# Patient Record
Sex: FEMALE
[De-identification: names, ages, dates, MRNs, and addresses within clinical notes are randomized per-mention and may not be internally consistent; named-entity substitution may affect disease eponyms.]

---

## 2020-05-24 ENCOUNTER — HOSPITAL ENCOUNTER (EMERGENCY)
Dept: HOSPITAL 88 - FSED | Age: 30
Discharge: HOME | End: 2020-05-24
Payer: COMMERCIAL

## 2020-05-24 VITALS — WEIGHT: 293 LBS | BODY MASS INDEX: 41.95 KG/M2 | HEIGHT: 70 IN

## 2020-05-24 DIAGNOSIS — M79.642: Primary | ICD-10-CM

## 2020-05-24 DIAGNOSIS — S62.307A: ICD-10-CM
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
## 2020-05-24 PROCEDURE — 99283 EMERGENCY DEPT VISIT LOW MDM: CPT

## 2020-05-24 NOTE — XMS REPORT
Summary of Care

                             Created on: 2020



WANDA ARITA

External Reference #: [id number]

: 1990

Sex: Female



Demographics





                          Address                   [address] [place] [address]

 

                          Preferred Language        English

 

                          Marital Status            Unknown

 

                          Scientology Affiliation     Unknown

 

                          Race                      White

 

                          Ethnic Group              Non-





Author





                          Author                    WANDA Dejesus R.N.

 

                          Organization              Unknown

 

                          Address                   [facility]



 

                          Phone                     Unavailable







Care Team Providers





                    Care Team Member Name Role                Phone

 

                    Danni Dejesus R.N.   Unavailable         Unavailable

 

                    ASHELY CONROY,  JONG Unavailable         Unavailable

 

                    FEROZ ESQUIVEL Unavailable         Unavailable

 

                    KAMALJIT BRANDT,  ALEJANDRO ARROYO  Unavailable         Unavailable

 

                    Jef Ortega MD Unavailable         Unavailable

 

                    ASHELY BRANDT [place],  JONG Unavailable         Unavailable

 

                    TERESA BRANDT,  PABLO WALKER Unavailable         Unavailable

 

                    KAILEY BRANDT,  JAMEY ARROYO Unavailable         Unavailable

 

                    Mark BRANDT,  Salvador Unavailable         Unavailable

 

                          Unavailable               Unavailable







Functional Status





                    Name                Dates               Details

 

                                        Functional status health issues are not 

documented

                                                    Status: 







                    Name                Dates               Details

 

                                        Cognitive status health issues are not d

ocumented

                                                    Status: 







Problems





                    Name                Dates               Details

 

                                        Need for rubella vaccination (V04.3, Z23

) 

                                                    Status: Active

 

                                        Constipation (564.00, K59.00) 

                                                    Status: Active

 

                                        Morbid obesity (278.01, E66.01) 

                                                    Status: Active

 

                                        General counseling and advice for contra

ceptive management (V25.09, Z30.09) 

                                                    Status: Active

 

                                        Elevated blood pressure reading without 

diagnosis of hypertension (796.2, R03.0)



                                                    Status: Active

 

                                        Morbid obesity with body mass index (BMI

) of 45.0 to 49.9 in adult (278.01, 

E66.01) 

                                                    Status: Active

 

                                        Acid reflux (530.81, K21.9) 

                                                    Status: Active

 

                                        Epistaxis (784.7, R04.0) 

                                                    Status: Active

 

                                        Menorrhagia with irregular cycle (626.2,

 N92.1) 

                                                    Status: Active

 

                                        Type 2B von Willebrand's disease (286.4,

 D68.0) 

                                                    Status: Active

 

                                        Former smoker (V15.82, Z87.891) 

                                                    Status: Active

 

                                        Encounter for screening examination for 

sexually transmitted disease (V74.5, 

Z11.3) 

                                                    Status: Active

 

                                        Encounter for blood test for routine gen

eral physical examination (V72.62, 

Z00.00) 

                                                    Status: Active

 

                                        Encounter for routine gynecological exam

ination with Papanicolaou smear of 

cervix (V72.31, Z01.419) 

                                                    Status: Active

 

                                        Breast mass in female (611.72, N63.0) 

                                                    Status: Active

 

                                        Pelvic pain in female (625.9, R10.2) 

                                                    Status: Active

 

                                        Follow up (V67.9, Z09) 

                                                    Status: Active

 

                                        History of  section, unknown sca

r (V45.89, Z98.891) 

                                                    Status: Active

 

                                        Bacterial vaginosis (616.10, N76.0) 

                                                    Status: Active







Medications





                    Name                Dates               Details

 

                                        Tranexamic Acid 650 MG Oral Tablet

Take 2 tablets po every 8 hours starting on day 1 of menses for a total of 5 
days



 

                                         Quantity: 30









FEROZ ESQUIVEL * 



                                         Start : 18-Sep-2019





Active

metroNIDAZOLE 500 MG Oral Tablet

TAKE ONE TABLET BY MOUTH TWICE A DAY . DO NOT DRINK ALCOHOL

* 



                           Quantity: 14              Refills: 0









JONG LUND M.D. * 



                                         Start : 5-Mar-2020





Active





Allergies and Adverse Reactions





                    Name                Dates               Details

 

                    Codeine Derivatives (Allergy)                     Status: Ac

tive



 

                    Zofran TABS (Allergy)                     Reaction: Rash, It

anival

Status: Active









Past Medical History





                    Name                Dates               Details

 

                                        History of Anemia complicating pregnancy

 (648.20, O99.019) 

                                                    Status: Resolved

 

                                        History of High-risk pregnancy supervisi

on (V23.9, O09.90) 

                                                    Status: Resolved

 

                                        History of hyperemesis gravidarum (V13.2

9, Z87.59) 

                                                    Status: Resolved

 

                                        History of Hypertension complicating pre

gnancy, unspecified trimester (642.90, 

O16.9) 

                                                    Status: Resolved

 

                                        History of MTHFR mutation (V12.3, Z15.89

) 

                                                    Status: Resolved

 

                                        History of nausea (V12.79, Z87.898) 

                                                    Status: Resolved

 

                                        History of Nexplanon insertion (V25.5, Z

30.017) 

                                                    Status: Resolved







Procedures





                    Procedure           Dates               Details

 

                    History of  Section                     Completed 



 

                    History of Tubal Ligation                     Completed 









Immunization





                    Name                Dates               Details

 

                                        Fluzone Quadrivalent 0.5 ML Intramuscula

r Suspension #1

Lot #: [id number]            on: 3-Sep-2014             

 

                                        Tdap (Adacel) #1

Lot #: [id number]            on: 3-Sep-2014             

 

                                        Tdap (Boostrix) #1

Lot #: [id number]            on: 2014            







Family History





                    Name                Dates               Details

 

                                        Family history of essential hypertension

 (V17.49, Z82.49) 

                                                    Status: Active

 

                                        Family history of thyroid disease (V18.1

9, Z83.49) 

                                                    Status: Active

 

                                        Family history of type 2 diabetes [facility] (V18.0, Z83.3) 

                                                    Status: Active







Social History





                    Name                Dates               Details

 

                                        -

                                                    Status: 







                    Name                Dates               Details

 

                    Ex-smoker (finding)                      

 

                    Ex-smoker (finding)                      







Vital Signs





                Date            Test            Result          Details

 

                                                                 

 

                                        [id number]

                    Systolic blood pressure 135 mm[Hg]          Status: Comments

: Location: RUE; Position: 

Sitting



 

                    Diastolic blood pressure 86 mm[Hg]           Status: Comment

s: Location: RUE; Position: 

Sitting



 

                    Body height         70 in               Status: 



 

                    Weight              338 lb              Status: 



 

                    Body mass index (BMI) [Ratio] 48.5 kg/m2          Status: 



 

                    Body surface area Derived from formula 2.61 m2             S

tatus: 



 

                    Body temperature    98.5 f              Status: Comments: Me

thod: Oral



 

                    Heart Rate          88 /min             Status: 









Results





                Date            Description     Value           Details

 

                    Results not documented                      

 

                                                                 







Plan of Care





                    Name                Dates               Details

 

                                        Planned Observations 

 

                    Planned Goals not documented                      

 

                                        Planned Encounters 

 

                                        Appointment; ADULT, HEMOPHILIA

                                        On: 3-Jeremy-2020 9:00









Interventions Provided

Medication Changes* metroNIDAZOLE 500 MG Oral Tablet - Start





Instructions





                    Name                Dates               Details

 

                                        Instructions not documented

                                                     







Encounters





                                        Appointment; PABLO MOORE M.D. 

Encounter Diagnosis: Problem not documented

                                        On: 2018 13:30



 

                                        Appointment; [address] 

Encounter Diagnosis: Problem not documented

                                        On: 2018 14:00



 

                                        Appointment; PABLO MOORE M.D. 

Encounter Diagnosis: Problem not documented

                                        On: 2019 8:00



 

                                        Appointment; SALVADOR LOPEZ M.D . 

Encounter Diagnosis: Problem not documented

                                        On: 2019 9:00



 

                                        Appointment; ADULT, HEMOPHILIA 

Encounter Diagnosis: Problem not documented

                                        On: 18-Sep-2019 9:00



 

                                        Appointment; ADULT, HEMOPHILIA 

Encounter Diagnosis: Problem not documented

                                        On: 4-Dec-2019 9:00



 

                                        Appointment; JONG LUND M.D. 

Encounter Diagnosis: Problem not documented

                                        On: 2020 14:15



 

                                        Appointment; ADULT, HEMOPHILIA 

Encounter Diagnosis: Problem not documented

                                        On: 3-Richi-2020 9:00



 

                                        Appointment; ADULT, HEMOPHILIA 

Encounter Diagnosis: Problem not documented

                                        On: 2020 12:00



 

                                        Appointment; JONG LUND M.D. 

Encounter Diagnosis: Problem not documented

                                        On: 2020 13:30

## 2020-05-24 NOTE — XMS REPORT
Summary of Care

                             Created on: 2020



WANDA ARITA

External Reference #: [id number]

: 1990

Sex: Female



Demographics





                          Address                   [address] [place]  [address]

 

                          Preferred Language        English

 

                          Marital Status            Unknown

 

                          Adventist Affiliation     Unknown

 

                          Race                      White

 

                          Ethnic Group              Non-





Author





                          Author                    [facility]

 

                          Organization              [facility]

 

                          Address                   [address] [place]  [address]



 

                          Phone                     Unavailable







Care Team Providers





                    Care Team Member Name Role                Phone

 

                    SONIA ANTOINE,  FEROZ Unavailable         Unavailable

 

                    KAMALJIT BRANDT,  ALEJANDRO ARROYO  Unavailable         Unavailable

 

                    Jordan BRANDT,  Jfe Unavailable         Unavailable

 

                    ASHELY BRANDT [place],  JONG Unavailable         Unavailable

 

                    TERESA BRANDT,  PABLO WALKER Unavailable         Unavailable

 

                    KAILEY BRANDT,  JAMEY ARROYO Unavailable         Unavailable

 

                    Mark BRANDT,  Salvador Unavailable         Unavailable

 

                          Unavailable               Unavailable







Functional Status





                    Name                Dates               Details

 

                                        Functional status health issues are not 

documented

                                                    Status: 







                    Name                Dates               Details

 

                                        Cognitive status health issues are not d

ocumented

                                                    Status: 







Problems





                    Name                Dates               Details

 

                                        Need for rubella vaccination (V04.3, Z23

) 

                                                    Status: Active

 

                                        Constipation (564.00, K59.00) 

                                                    Status: Active

 

                                        Morbid obesity (278.01, E66.01) 

                                                    Status: Active

 

                                        General counseling and advice for contra

ceptive management (V25.09, Z30.09) 

                                                    Status: Active

 

                                        Elevated blood pressure reading without 

diagnosis of hypertension (796.2, R03.0)



                                                    Status: Active

 

                                        Morbid obesity with body mass index (BMI

) of 45.0 to 49.9 in adult (278.01, 

E66.01) 

                                                    Status: Active

 

                                        Acid reflux (530.81, K21.9) 

                                                    Status: Active

 

                                        Epistaxis (784.7, R04.0) 

                                                    Status: Active

 

                                        Menorrhagia with irregular cycle (626.2,

 N92.1) 

                                                    Status: Active

 

                                        Type 2B von Willebrand's disease (286.4,

 D68.0) 

                                                    Status: Active

 

                                        Former smoker (V15.82, Z87.891) 

                                                    Status: Active

 

                                        Encounter for screening examination for 

sexually transmitted disease (V74.5, 

Z11.3) 

                                                    Status: Active

 

                                        Encounter for blood test for routine gen

eral physical examination (V72.62, 

Z00.00) 

                                                    Status: Active

 

                                        Encounter for routine gynecological exam

ination with Papanicolaou smear of 

cervix (V72.31, Z01.419) 

                                                    Status: Active

 

                                        Breast mass in female (611.72, N63.0) 

                                                    Status: Active

 

                                        Pelvic pain in female (625.9, R10.2) 

                                                    Status: Active







Medications





                    Name                Dates               Details

 

                                        Tranexamic Acid 650 MG Oral Tablet

Take 2 tablets po every 8 hours starting on day 1 of menses for a total of 5 
days



 

                                         Quantity: 30









FEROZ SCOTT N.P. * 



                                         Start : 18-Sep-2019





Active

Iron 100 Plus 100-250-0.025-1 MG Oral Tablet

* 



                                         Refills: 0







* 



                                         Start : 2020





Active





Allergies and Adverse Reactions





                    Name                Dates               Details

 

                    Codeine Derivatives (Allergy)                     Status: Ac

tive



 

                    Zofran TABS (Allergy)                     Reaction: Rash, It

anival

Status: Active









Past Medical History





                    Name                Dates               Details

 

                                        History of Anemia complicating pregnancy

 (648.20, O99.019) 

                                                    Status: Resolved

 

                                        History of High-risk pregnancy supervisi

on (V23.9, O09.90) 

                                                    Status: Resolved

 

                                        History of hyperemesis gravidarum (V13.2

9, Z87.59) 

                                                    Status: Resolved

 

                                        History of Hypertension complicating pre

gnancy, unspecified trimester (642.90, 

O16.9) 

                                                    Status: Resolved

 

                                        History of MTHFR mutation (V12.3, Z15.89

) 

                                                    Status: Resolved

 

                                        History of nausea (V12.79, Z87.898) 

                                                    Status: Resolved

 

                                        History of Nexplanon insertion (V25.5, Z

30.017) 

                                                    Status: Resolved







Procedures





                    Procedure           Dates               Details

 

                    [LH] TSH+Free T4    Date: 2020     

 

                    . [facility] - Affirm VPIII (BV Panel) Date: 2020     

 

                    . [facility] - PAP      Date: 2020     

 

                    [QH] HIV AB, HIV 1/2, EIA, WITH REFLEXES Date: 2020   

  

 

                    US Pelvis with Pelvis Transvaginal 27311 Date: 2020   

  

 

                    MA Digital Mammo DX Charlie  Date: 2020     

 

                    US Breast  Unilat 37072 Date: 2020     

 

                    CT Abdomen/Pelvis w/wo contrast 84942 Date: 2020     

 

                    History of  Section                     Completed 



 

                    History of Tubal Ligation                     Completed 









Immunization





                    Name                Dates               Details

 

                                        Fluzone Quadrivalent 0.5 ML Intramuscula

r Suspension #1

Lot #: [id number]            on: 3-Sep-2014             

 

                                        Tdap (Adacel) #1

Lot #: [id number]            on: 3-Sep-2014             

 

                                        Tdap (Boostrix) #1

Lot #: [id number]            on: 2014            







Family History





                    Name                Dates               Details

 

                                        Family history of essential hypertension

 (V17.49, Z82.49) 

                                                    Status: Active

 

                                        Family history of thyroid disease (V18.1

9, Z83.49) 

                                                    Status: Active

 

                                        Family history of type 2 diabetes [facility] (V18.0, Z83.3) 

                                                    Status: Active







Social History





                    Name                Dates               Details

 

                                        -

                                                    Status: 







                    Name                Dates               Details

 

                    Former smoker                            

 

                    Former smoker                            







Vital Signs





                Date            Test            Result          Details

 

                                                                 

 

                                        :55

                    BP Systolic         137 mm[Hg]          Status: Comments: Lo

cation: RUE; Position: Sitting



 

                    BP Diastolic        86 mm[Hg]           Status: Comments: Lo

cation: RUE; Position: Sitting



 

                    Height              70 in               Status: 



 

                    Weight              333.375 lb          Status: 



 

                    Body Mass Index Calculated 47.83 kg/m2         Status: 



 

                    Body Surface Area Calculated 2.59 m2             Status: 



 

                    Temperature         98.3 f              Status: Comments: Me

thod: Oral



 

                    Heart Rate          76 /min             Status: 









Results





                Date            Description     Value           Details

 

                    :05     [QLH] LIPID PANEL    

 

                                CHOLESTEROL, TOTAL 184  mg/dl (Normal) Range: <2

00





 

                                HDL CHOLESTEROL 47  mg/dl (Below low threshold) 

Range: >50





 

                                TRIGLYCERIDES   155  mg/dl (Above high threshold

) Range: <150





 

                                LDL-CHOLESTEROL 110  {MG/DL__CAL} (Above high th

reshold) Comments: Reference 

range: <100 Desirable range <100 mg/dL for primary prevention;  <70 mg/dL for 
patients with CHD or diabetic patients with > or = 2 CHD risk factors. LDL-C is 
now calculated using the Falguni calculation, which is a validated novel 
method providing better accuracy than the Friedewald equation in the estimation 
of LDL-C. Derek TIRADO et al. DAVE. 2013;310(19): 0143-0664 (http
://education.[employer]UR[employer].com/faq/LVK508)



 

                                CHOL/HDLC RATIO 3.9  {CALC} (Normal) Range: <5.0





 

                                NON HDL CHOLESTEROL 137  {MG/DL__CAL} (Above hig

h threshold) Range: <130

Comments: For patients with diabetes plus 1 major ASCVD risk factor, treating to
 a non-HDL-C goal of <100 mg/dL (LDL-C of <70 mg/dL) is considered a therapeutic
 option.



 

                          [id number]           [Q] HIV-1/2 Antigen and Anti

bodies, Fourth Generation, with 

Reflexes                                 

 

                                HIV AG/AB, 4TH GEN NON-REACTIVE   (Normal) Range

: NON-REACTIVE

Comments: HIV-1 antigen and HIV-1/HIV-2 antibodies were notdetected. There is no
 laboratory evidence of HIVinfection. PLEASE NOTE: This information has been 
disclosed toyou from records whose confidentiality may beprotected by state law.
  If your state requires suchprotection, then the state law prohibits you 
frommaking any further disclosure of the informationwithout the specific written
 consent of the personto whom it pertains, or as otherwise permitted by law.A 
general authorization for the release of medical orother information is NOT 
sufficient for this purpose.  For additional information please refer t
[employer]://education.[employer]/faq/LUF617(This link is being provided 
for informational/educational purposes only.)  The performance of this assay has
 not been clinicallyvalidated in patients less than 2 years old.



 

                    [id number]     [QLH] CMP W/EGFR     

 

                                GLUCOSE         84  mg/dl (Normal) Range: 65-99

Comments: Fasting reference interval



 

                                UREA NITROGEN (BUN) 12  mg/dl (Normal) Range: 7-

25





 

                                CREATININE      0.68  mg/dl (Normal) Range: 0.50

-1.10





 

                                eGFR NON- 117  {ML/MIN/1.7} (Nor

mal) Range: > OR = 60





 

                                eGFR  135  {ML/MIN/1.7} (Normal)

 Range: > OR = 60





 

                                BUN/CREATININE RATIO NOT APPLICABLE  {CALC} Rang

e: 6-22





 

                                SODIUM          141  mmol/L (Normal) Range: 135-

146





 

                                POTASSIUM       3.9  mmol/L (Normal) Range: 3.5-

5.3





 

                                CHLORIDE        104  mmol/L (Normal) Range: 98-1

10





 

                                CARBON DIOXIDE  25  mmol/L (Normal) Range: 20-32





 

                                CALCIUM         9.3  mg/dl (Normal) Range: 8.6-1

0.2





 

                                PROTEIN, TOTAL  7.6  g/dl (Normal) Range: 6.1-8.

1





 

                                ALBUMIN         3.9  g/dl (Normal) Range: 3.6-5.

1





 

                                GLOBULIN        3.7  {G/DL__CALC} (Normal) Range

: 1.9-3.7





 

                                ALBUMIN/GLOBULIN RATIO 1.1  {CALC} (Normal) Rang

e: 1.0-2.5





 

                                BILIRUBIN, TOTAL 0.7  mg/dl (Normal) Range: 0.2-

1.2





 

                                ALKALINE PHSPHATASE 77  u/l (Normal) Range: 33-1

15





 

                                AST             54  u/l (Above high threshold) R

michaela: 10-30





 

                                ALT             47  u/l (Above high threshold) R

michaela: 6-29





 

                    :05     [QL] CBC (INCLUDES DIFF/PLT)   

 

                                WHITE BLOOD CELL COUNT 8.1  {Thousand/u} (Normal

) Range: 3.8-10.8





 

                                RED BLOOD CELL COUNT 4.66  {Million/uL} (Normal)

 Range: 3.80-5.10





 

                                HEMAGLOBIN      11.9  g/dl (Normal) Range: 11.7-

15.5





 

                                HEMATOCRIT      37.7  % (Normal) Range: 35.0-45.

0





 

                                MCV             80.9  fL (Normal) Range: 80.0-10

0.0





 

                                MCH             25.5  pg (Below low threshold) R

michaela: 27.0-33.0





 

                                MCHC            31.6  g/dl (Below low threshold)

 Range: 32.0-36.0





 

                                RDW             14.5  % (Normal) Range: 11.0-15.

0





 

                                PLATELET COUNT  283  {Thousand/u} (Normal) Range

: 140-400





 

                                MPV             11.4  fL (Normal) Range: 7.5-12.

5





 

                                ABSOLUTE NEUTROPHILS 4957  {cells/uL} (Normal) R

michaela: 6975-1217





 

                                ABSOLUTE LYMPHOCYTES 2252  {cells/uL} (Normal) R

michaela: 850-3900





 

                                ABSOLUTE MONOCYTES 510  {cells/uL} (Normal) Rang

e: 200-950





 

                                ABSOLUTE EOSINOPHILS 332  {cells/uL} (Normal) Ra

nge: 





 

                                ABSOLUTE BASOPHILS 49  {cells/uL} (Normal) Range

: 0-200





 

                                        NEUTROPHILS         61.2  % (Normal)  

 

                                        LYMPHOCYTES         27.8  % (Normal)  

 

                                        MONOCYTES           6.3  % (Normal)  

 

                                        EOSINOPHILS         4.1  % (Normal)  

 

                                        BASOPHILS           0.6  % (Normal)  

 

                    :05     [QH] HEPATITIS B SURFACE ANTIGEN W/REFL 

CONFIRM   

 

                                HEPATITIS B SURFACE ANTIGEN NON-REACTIVE   (Norm

al) Range: NON-REACTIVE





 

                    :05     [QL] HEPATITIS C ANTIBODY   

 

                                HEPATITIS C ANTIBODY NON-REACTIVE   (Normal) Ran

ge: NON-REACTIVE





 

                                SIGNAL TO CUT-OFF 0.01   (Normal) Range: <1.00

Comments: HCV antibody was non-reactive. There is no laboratory evidence of HCV 
infection. In most cases, no further action is required. However,if recent HCV 
exposure is suspected, a test for HCV RNA(test code 06780) is suggested. For 
additional information please refer 
tohttp://[employer]/faq/HYB09r5(This link is being provided 
for informational/educational purposes only.)



 

                    :05     [QL] T4, FREE       

 

                                T4, FREE        1.1  ng/dl (Normal) Range: 0.8-1

.8





 

                    :     [Former[facility]] TSH, 3RD GENERATION   

 

                                TSH             1.71  {MIU/L} (Normal) Comments:

 Reference Range                     > or 

= 20 Years  0.40-4.50                          Pregnancy Ranges          First 
trimester    0.26-2.66          Second trimester   0.55-2.73          Third 
trimester    0.43-2.91



 

                    :     [[facility]] HEMOGLOBIN A1c   

 

                                HEMOGLOBIN A1c  5.9  {%_of_total} (Above high th

reshold) Range: <5.7

Comments: For someone without known diabetes, a hemoglobin A1c value between 
5.7% and 6.4% is consistent withprediabetes and should be confirmed with a 
follow-up test. For someone with known diabetes, a value &lt;7%indicates that 
their diabetes is well controlled. [id number] should be individualized based on 
duration ofdiabetes, age, comorbid conditions, and otherconsiderations. This 
assay result is consistent with an increased riskof diabetes. Currently, no 
consensus exists regarding use ofhemoglobin A1c for diagnosis of diabetes for 
children.



 

                    :05     [[facility]] RPR            

 

                                RPR (MONITOR) W/REFL TITER (REFL) NON-REACTIVE  

 (Normal) Range: NON-REACTIVE











Plan of Care





                    Name                Dates               Details

 

                                        Planned Observations 

 

                    Planned Goals not documented                      

 

                                        Planned Encounters 

 

                                        Appointment; ADULT, HEMOPHILIA

                                        On: 2020 12:00



 

                                        Appointment; JONG LUND M.D.

                                        On: 2020 15:00



 

                                        Appointment; ADULT, HEMOPHILIA

                                        On: 3-Jeremy-2020 9:00









Instructions





                    Name                Dates               Details

 

                                        Instructions not documented

                                                     







Encounters





                                        Appointment; PABLO MOORE M.D. 

Encounter Diagnosis: Problem not documented

                                        On: 2018 13:30



 

                                        Appointment; [address] 

Encounter Diagnosis: Problem not documented

                                        On: 2018 14:00



 

                                        Appointment; PABLO MOORE M.D. 

Encounter Diagnosis: Problem not documented

                                        On: 2019 8:00



 

                                        Appointment; SALVADOR LOPEZ M.D . 

Encounter Diagnosis: Problem not documented

                                        On: 2019 9:00



 

                                        Appointment; ADULT, HEMOPHILIA 

Encounter Diagnosis: Problem not documented

                                        On: 18-Sep-2019 9:00



 

                                        Appointment; ADULT, HEMOPHILIA 

Encounter Diagnosis: Problem not documented

                                        On: 4-Dec-2019 9:00



 

                                        Appointment; JONG LUND M.D. 

Encounter Diagnosis: Problem not documented

                                        On: 2020 14:15



 

                                        Appointment; ADULT, HEMOPHILIA 

Encounter Diagnosis: Problem not documented

                                        On: 3-Richi-2020 9:00

## 2020-05-24 NOTE — XMS REPORT
Summary of Care: 14 - 14

                             Created on: 2023



WANDA ARITA

External Reference #: [id number]

: 1990

Sex: Female



Demographics





                          Address                   [address] [place] [address]-

 

                          Home Phone                [phone]

 

                          Preferred Language        English

 

                          Marital Status            Single

 

                          [employer]iliation     Church

 

                          Race                      Other

 

                          Ethnic Group              /Latin





Author





                          Organization              Unknown

 

                          Address                   Unknown

 

                          Phone                     Unavailable







Encounter





HQ Gavin(NIYA) [id number] Date(s): 14 - 14

[address] [phone]
[phone]

Discharge Disposition: Home

Physician Attending: Melanie Quinteros MD

Physician Admitting: Melanie Quinteros MD





Reason for Visit





13 W 4 D



Vital Signs





                    1                   2                   3



                                         Most recent to   



                                         oldest [Reference   



                                         Range]:   

 

                                        177.8 cm 

                    (14 7:43 PM)                        



                                         Height   

 

                                        98.8 DegF 

                          (14 4:59 AM)          98.1 DegF 

                          (14 10:00 PM)        98.9 DegF 

                                        (14 10:00 AM)



                                         Temperature Oral   



                                         [96.4-99.1 DegF]   

 

                                        104 mmHg 

                          (14 4:59 AM)          129 mmHg 

                          (14 10:00 PM)        101 mmHg 

                                        (14 10:00 AM)



                                         Systolic Blood   



                                         Pressure [   



                                         mmHg]   

 

                                        65 mmHg 

                          (14 4:59 AM)          64 mmHg 

                          (14 10:00 PM)        60 mmHg 

                                        (14 10:00 AM)



                                         Diastolic Blood   



                                         Pressure [60-90   



                                         mmHg]   

 

                                        18 BRMIN 

                          (14 4:59 AM)          18 BRMIN 

                          (14 10:00 PM)        18 BRMIN 

                                        (14 10:00 AM)



                                         Respiratory Rate   



                                         [14-20 BRMIN]   

 

                                        82 bpm 

                          (14 4:59 AM)          76 bpm 

                          (14 10:00 PM)        80 bpm 

                                        (14 10:00 AM)



                                         Peripheral Pulse   



                                         Rate [ bpm]   

 

                                        140 kg 

                    (14 7:43 PM)                        



                                         Weight   

 

                                        44.29 m2 

                    (14 7:43 PM)                        



                                         Body Mass Index   







Problem List





    



              Condition    Effective Dates  Status       Health Status  Informan

t

 

    



                           Anemia(Confirmed)         Resolved  







Allergies, Adverse Reactions, Alerts





   



                 Substance       Reaction        Severity        Status

 

   



                           codeine                   Active

 

   



                           Zofran                    Active







Medications





D5LR 1,000 mL

1,000 mL, Rate: 125 ml/hr, Infuse over: 8 hr, Route: IV, Dosing Weight 136.364 k
g, Total Volume: 1,000, Start date: 14 19:13:00, Duration: 30 day, Stop da
te: 14 19:12:00

Start Date: 14

Stop Date: 14

Status: Discontinued



Lactated Ringers (Bolus) IV

1,000 mL, 1,000 ml/hr, Infuse Over: 1 hr, Route: IV, 1,000, Drug form: INJ, ONCE
, Priority: STAT, Dosing Weight 140 kg, Start date: 14 6:10:00, Duration: 
1 doses or times, Stop date: 14 6:10:00

Start Date: 14

Stop Date: 14

Status: Completed



loperamide

2 mg, 1 cap, Route: PO, Drug form: CAP, Q6H, Dosing Weight 140, kg, PRN as neede
d for loose stool, Start date: 14 3:03:00, Duration: 30 day, Stop date:  3:02:00

Notes: (Same as: Imodium)  MAX adult dose is 8 caps/day

Start Date: 14

Stop Date: 14

Status: Discontinued



multivitamin, prenatal

1 tab, Route: PO, Drug Form: TAB, Dosing Weight 136.364, kg, Daily, Start date: 
14 9:00:00, Duration: 30 day, Stop date: 14 9:00:00

Start Date: 14

Stop Date: 14

Status: Discontinued



Pepcid

20 mg, 2 mL, Route: IVP, Drug form: INJ, Q12H, Dosing Weight 140, kg, Start date
: 14 9:00:00, Duration: 30 day, Stop date: 14 21:00:00

Notes: (Same as: Pepcid)Can be dilute in 5-10cc NS  IVP: Slow IV push over at le
ast 2 minutes.

Start Date: 14

Stop Date: 14

Status: Discontinued



Pepcid 20 mg oral tablet

20 mg = 1 tab, PO, BID, # 60 tab, 0 Refill(s)

Start Date: 14

Status: Ordered



Phenergan

25 mg, 1 mL, Route: IVPB, Drug form: INJ, Q6H, Dosing Weight 136.364, kg, Start 
date: 14 0:00:00, Duration: 30 day, Stop date: 14 18:00:00

Notes: Do not give IV push.  (Same as: Phenergan)

Start Date: 14

Stop Date: 14

Status: Discontinued



Phenergan 25 mg oral tablet

25 mg = 1 tab, PO, Q6H, Nausea, # 15 tab, 0 Refill(s)

Start Date: 14

Status: Ordered



Prenatal 1 Plus 1 oral tablet

1 tab, PO, Daily, # 100 tab, 0 Refill(s)

Start Date: 14

Status: Ordered



Reglan

10 mg, 2 mL, Route: IVP, Drug form: INJ, Q6H, Dosing Weight 136.364, kg, Start d
ate: 14 0:00:00, Duration: 30 day, Stop date: 14 18:00:00

Notes: (Same as: Reglan)

Start Date: 14

Stop Date: 14

Status: Discontinued



Reglan 10 mg oral tablet

10 mg, PO, QID-Before Meals, nausea and vomiting, # 40 tab, 0 Refill(s)

Start Date: 14

Status: Ordered



Vitamin B6

25 mg, 1 tab, Route: PO, Drug form: TAB, Q8H, Dosing Weight 136.364, kg, Start d
ate: 14 0:00:00, Duration: 30 day, Stop date: 14 16:00:00

Notes: (Same as: Vitamin B6)

Start Date: 14

Stop Date: 14

Status: Discontinued



Vitamin B6 25 mg oral tablet

25 mg = 1 tab, PO, Q6H, # 30 tab, 0 Refill(s)

Start Date: 14

Status: Ordered



Results





ELECTROLYTES



  



                     Most recent to      1                   2



                                         oldest [Reference  



                                         Range]:  

 

  



                     Sodium Lvl [135-145  142 mEq/L           142 mEq/L



                     mEq/L]              (14 5:09 AM)    (14 8:09 PM)

 

  



                     Potassium Lvl       3.9 mEq/L           4.2 mEq/L



                     [3.5-5.1 mEq/L]     (14 5:09 AM)    (14 8:09 PM)

 

  



                     Chloride Lvl [  108 mEq/L           107 mEq/L



                     mEq/L]              (14 5:09 AM)    (14 8:09 PM)

 

  



                     CO2 [24-32 mEq/L]   22 mEq/L            23 mEq/L



                           *LOW*                     *LOW*



                           (14 5:09 AM)          (14 8:09 PM)

 

  



                     AGAP [10.0-20.0     15.9 mEq/L          16.2 mEq/L



                     mEq/L]              (14 5:09 AM)    (14 8:09 PM)







CHEM PANEL



  



                     Most recent to      1                   2



                                         oldest [Reference  



                                         Range]:  

 

  



                     Creatinine Lvl      0.6 mg/dL           0.6 mg/dL



                     [0.5-1.4 mg/dL]     (14 5:09 AM)    (14 8:09 PM)

 

  



                     eGFR                126 mL/min/1.73m2 1  127 mL/min/1.73m2 

2



                           *NA*                      *NA*



                           (14 5:09 AM)          (14 8:09 PM)

 

  



                     BUN [7-22 mg/dL]    6 mg/dL             9 mg/dL



                           *LOW*                     (14 8:09 PM)



                                         (14 5:09 AM) 

 

  



                     B/C Ratio [6-25]    10                  15



                           (14 5:09 AM)          (14 8:09 PM)

 

  



                     Glucose Lvl [70-99  83 mg/dL 3          85 mg/dL 4



                     mg/dL]              (14 5:09 AM)    (14 8:09 PM)

 

  



                     Total Protein       5.8 g/dL            7.1 g/dL



                     [6.4-8.4 g/dL]      *LOW*               (14 8:09 PM)



                                         (14 5:09 AM) 

 

  



                     Albumin Lvl [3.5-5.0  2.6 g/dL            3.3 g/dL



                     g/dL]               *LOW*               *LOW*



                           (14 5:09 AM)          (14 8:09 PM)

 

  



                     Globulin [2.0-4.0   3.2 g/dL            3.8 g/dL



                     g/dL]               (14 5:09 AM)    (14 8:09 PM)

 

  



                     A/G Ratio [0.7-1.6]  0.8                 0.9



                           (14 5:09 AM)          (14 8:09 PM)

 

  



                     Calcium Lvl         8.8 mg/dL           9.7 mg/dL



                     [8.5-10.5 mg/dL]    (14 5:09 AM)    (14 8:09 PM)

 

  



                     ALT [0-65 unit/L]   23 unit/L           25 unit/L



                           (14 5:09 AM)          (14 8:09 PM)

 

  



                     AST [0-37 unit/L]   14 unit/L           27 unit/L



                           (14 5:09 AM)          (14 8:09 PM)

 

  



                     Alk Phos [    50 unit/L           70 unit/L



                     unit/L]             (14 5:09 AM)    (14 8:09 PM)

 

  



                     Bili Total [0.2-1.3  0.4 mg/dL           0.3 mg/dL



                     mg/dL]              (14 5:09 AM)    (14 8:09 PM)

 

  



                     Amylase Lvl [  41 unit/L           51 unit/L



                     unit/L]             (14 5:09 AM)    (14 8:09 PM)

 

  



                     Lipase Lvl [  89 unit/L           115 unit/L



                     unit/L]             (14 5:09 AM)    (14 8:09 PM)







1Result Comment: The eGFR is calculated using the CKD-EPI formula. In most 
young, healthy individuals the eGFR will be >90 mL/min/1.73m2. The eGFR declines
with age. An eGFR of 60-89 may be normal in some populations, particularly the 
elderly, for whom the CKD-EPI formula has not been extensively validated. Use of
the eGFR is not recommended in the following populations:



Individuals with unstable creatinine concentrations, including pregnant patients
and those with serious co-morbid conditions.



Patients with extremes in muscle mass or diet. 



The data above are obtained from the National Kidney Disease Education Program (
NKDEP) which additionally recommends that when the eGFR is used in patients with
extremes of body mass index for purposes of drug dosing, the eGFR should be mul
tiplied by the estimated BMI.



2Result Comment: The eGFR is calculated using the CKD-EPI formula. In most 
young, healthy individuals the eGFR will be >90 mL/min/1.73m2. The eGFR declines
with age. An eGFR of 60-89 may be normal in some populations, particularly the 
elderly, for whom the CKD-EPI formula has not been extensively validated. Use of
the eGFR is not recommended in the following populations:



Individuals with unstable creatinine concentrations, including pregnant patients
and those with serious co-morbid conditions.



Patients with extremes in muscle mass or diet. 



The data above are obtained from the National Kidney Disease Education Program (
NKDEP) which additionally recommends that when the eGFR is used in patients with
extremes of body mass index for purposes of drug dosing, the eGFR should be mul
tiplied by the estimated BMI.



3Interpretive Data: Adult reference range values reflect the clinical guidelines

of the American Diabetes Association.



4Interpretive Data: Adult reference range values reflect the clinical guidelines

of the American Diabetes Association.



THYROID PANEL



  



                     Most recent to      1                   2



                                         oldest [Reference  



                                         Range]:  

 

  



                           TSH [0.360-3.740          1.040 uIU/mL 



                           uIU/mL]                   (14 8:09 PM) 







URINE AND STOOL



  



                     Most recent to      1                   2



                                         oldest [Reference  



                                         Range]:  

 

  



                           UA Turbidity [Clear]      Clear 



                                         (14 9:03 AM) 

 

  



                           UA Color [Yellow]         Light Yellow 



                                         *NA* 



                                         (14 9:03 AM) 

 

  



                           UA pH [5.0-8.0]           7.0 



                                         (14 9:03 AM) 

 

  



                           UA Spec Grav              1.006 



                           [<=1.030]                 (14 9:03 AM) 

 

  



                           UA Glucose [Negative      Negative mg/dL 



                           mg/dL]                    *NA* 



                                         (14 9:03 AM) 

 

  



                           UA Blood [Negative]       Negative 



                                         (14 9:03 AM) 

 

  



                           UA Ketones [Negative      Negative mg/dL 



                           mg/dL]                    *NA* 



                                         (14 9:03 AM) 

 

  



                           UA Protein [Negative      Negative mg/dL 



                           mg/dL]                    (14 9:03 AM) 

 

  



                           UA Urobilinogen           <=1.0 mg/dL 



                           [0.1-1.0 mg/dL]           *NA* 



                                         (14 9:03 AM) 

 

  



                           UA Bili [Negative]        Negative 



                                         *NA* 



                                         (14 9:03 AM) 

 

  



                           UA Leuk Est               Small 



                           [Negative]                *ABN* 



                                         (14 9:03 AM) 

 

  



                           UA Nitrite                Negative 



                           [Negative]                (14 9:03 AM) 

 

  



                           UA WBC [0-5 /HPF]         1 /HPF 



                                         (14 9:03 AM) 

 

  



                           UA Bacteria [None         Occasional /HPF 



                           Seen /HPF]                *NA* 



                                         (14 9:03 AM) 

 

  



                           UA Sq Epi [Few /LPF]      Many /LPF 



                                         *ABN* 



                                         (14 9:03 AM) 

 

  



                           UA Amorph Chana [None      Occasional /HPF 



                           Seen /HPF]                *NA* 



                                         (14 9:03 AM) 

 

  



                           UA Mucus [None Seen       Few /LPF 



                           /LPF]                     *NA* 



                                         (14 9:03 AM) 







HEMATOLOGY



  



                     Most recent to      1                   2



                                         oldest [Reference  



                                         Range]:  

 

  



                     WBC [3.7-10.4 K/CMM]  8.0 K/CMM           9.9 K/CMM



                           (14 5:09 AM)          (14 8:09 PM)

 

  



                     RBC [4.20-5.40      3.61 M/CMM          4.18 M/CMM



                     M/CMM]              *LOW*               *LOW*



                           (14 5:09 AM)          (14 8:09 PM)

 

  



                     Hgb [12.0-16.0 g/dL]  10.2 g/dL           11.8 g/dL



                           *LOW*                     *LOW*



                           (14 5:09 AM)          (14 8:09 PM)

 

  



                     Hct [36.0-48.0 %]   30.6 %              35.4 %



                           *LOW*                     *LOW*



                           (14 5:09 AM)          (14 8:09 PM)

 

  



                     MCV [81.0-99.0 fL]  84.6 fL             84.5 fL



                           (14 5:09 AM)          (14 8:09 PM)

 

  



                     MCH [27.0-31.0 pg]  28.3 pg             28.3 pg



                           (14 5:09 AM)          (14 8:09 PM)

 

  



                     MCHC [32.0-36.0     33.5 g/dL           33.5 g/dL



                     g/dL]               (14 5:09 AM)    (14 8:09 PM)

 

  



                     RDW [11.5-14.5 %]   14.1 %              14.2 %



                           (14 5:09 AM)          (14 8:09 PM)

 

  



                     Platelet [133-450   200 K/CMM           239 K/CMM



                     K/CMM]              (14 5:09 AM)    (14 8:09 PM)

 

  



                     MPV [7.4-10.4 fL]   9.3 fL              9.5 fL



                           (14 5:09 AM)          (14 8:09 PM)

 

  



                     Segs [45.0-75.0 %]  61.4 %              64.6 %



                           (14 5:09 AM)          (14 8:09 PM)

 

  



                     Lymphocytes         27.2 %              26.2 %



                     [20.0-40.0 %]       (14 5:09 AM)    (14 8:09 PM)

 

  



                     Monocytes [2.0-12.0  5.9 %               5.0 %



                     %]                  (14 5:09 AM)    (14 8:09 PM)

 

  



                     Eosinophils [0.0-4.0  5.0 %               3.6 %



                     %]                  *HI*                (14 8:09 PM)



                                         (14 5:09 AM) 

 

  



                     Basophils [0.0-1.0  0.5 %               0.6 %



                     %]                  (14 5:09 AM)    (14 8:09 PM)

 

  



                     Segs-Bands #        4.9 K/CMM           6.4 K/CMM



                     [1.5-8.1 K/CMM]     (14 5:09 AM)    (14 8:09 PM)

 

  



                     Lymphocytes #       2.2 K/CMM           2.6 K/CMM



                     [1.0-5.5 K/CMM]     (14 5:09 AM)    (14 8:09 PM)

 

  



                     Monocytes # [0.0-0.8  0.5 K/CMM           0.5 K/CMM



                     K/CMM]              (14 5:09 AM)    (14 8:09 PM)

 

  



                     Eosinophils #       0.4 K/CMM           0.4 K/CMM



                     [0.0-0.5 K/CMM]     (14 5:09 AM)    (14 8:09 PM)

 

  



                           Basophils # [0.0-0.2      0.1 K/CMM 



                           K/CMM]                    (14 8:09 PM) 







Medications Administered During Your Visit





No data available for this section



Immunizations





No data available for this section



Social History





 



                           Social History Type       Response

 

 



                           Smoking Status            Never smoker, Type: Cigaret

kel, Exposure to Tobacco Smoke None,

Cigarette



                                         Smoking Last 365 Days No, Reg Smoking C

essation Counseling No







Assessment and Plan

Extracted from:



  



                     Title: Clinical Document  Author: Ashwin Ocampo MD  Date: 







                                        R2 GYN Note:



S: No complaints. Chai reg diet, Voiding freely. Denies N/V. Pt strongly desires 
to go home.

O: VitalsTmp(F)Tmp(C)[id number]

                                         04:[id number]/65---8218---

------

                                         [id number]/64---7618---

------

                                         10:[id number]/60---8018---

------

                                         [id number]/66---7618---

------

                                         22:[id number]/56---7118---

------



                                        24 Hr Tmax: 98.9F (37.17c) at  10:0

024 Hr Tmin: 98.1F (36.72c) at  

22:00

Gen:NAD

CV:RRR

Pulm:CTAB

Abd:soft, ND, NT, +bs

Ext: calves non-ttp



A/P: 22yo  @ 13w5d by doc 9wk kapil, with CHTN and MTHFR deficiency, 
presents with hyperemesis.

                                        1. Hyperemesis gravidarum- Pt is chai a r

egular diet overnight with food her 

 brought, on scheduled IV Phenergan and Reglan and Pepcid. No zofran 
secondary to allergy. No nausea or emesis overnight. UA neg for ketones. 
Recommended advancing pt to regular diet this am and transition to PO 
antiemetics, but pt is ademant about leaving AMA if she cannot leave now. 
Discussed not being able to know if she will chai the PO regiment. Pt verbalizes 
understanding but still wishes to proceed with discharge or she will leave AMA. 
Strict precautions given. AM CBC, CMP, amylase and lipase pending. Admission 
labs were wnl so strongly suspect these labs will be as well. Will f/u on the 
results.

                                        2.  - Voiding freely

                                        3. CHTN - Diagnosed with elevated BPs th

is pregnancy. Baseline PIH panel 

negative and Urine Pr:Cr ratio 0.1. Not on medications.BP wnl.

                                        4. FOB has brother with DS - s/p GC. s/p

 NIPT but results are pending.

                                        5. MTHFR deficiency - Heterozygote. On n

o medications.

                                        6. Rubella Equivocal - Needs MMR postpar

hillary

                                        7. H/o postpartum hemmorrhage - Required

 blood transfusions with two previous 

deliveries

                                        8. IUP at 13w5d- + FHTs on admission. PN

Ls: O+/-, Rub Equivocal, RPR NR, Hep B 

neg, 1T HIV neg, GC/Chl neg, Affirm neg

                                        9. D/C home with strict precautions and 

f/u in 1-2 wks.

                                        10. Pt discussed with PGY-3



Ashwin Ocampo MD

PGY-2







 



                           Addendum                  DCS number: [id number]





                           by Wendy Moss MD, PGYI



                                         Wendy Gonzales MD 



                                         on 



                                         2014 



                                         10:18

## 2020-05-24 NOTE — XMS REPORT
Summary of Care: 19 - 19

                             Created on: 2122



WANDA ARITA

External Reference #: [id number]

: 1990

Sex: Female



Demographics





                          Address                   [address] [place]  [address]-

 

                          Home Phone                [phone]

 

                          Preferred Language        English

 

                          Marital Status            

 

                          Taoism Affiliation     Congregational

 

                          Race                      White

 

                                        Additional Race(s)  

 

                          Ethnic Group              /Latin





Author





                          Author                    [facility]

 

                          Organization              [facility]

 

                          Address                   Unknown

 

                          Phone                     Unavailable







Encounter





MANDY Alonso(FIN) [id number] Date(s): 19 - 19

[address] [place] [address]-     
[phone]

Encounter Diagnosis

Gastro-esophageal reflux disease with esophagitis (Final) - 19

Unspecified chronic gastritis without bleeding (Final) - 

Morbid (severe) obesity due to excess calories (Final) - 

Discharge Disposition: Home or Self Care

Attending Physician: Ciro Mcclain MD

Referring Physician: Ciro Mcclain MD





Vital Signs





 



                           Most recent to            1



                                         oldest [Reference 



                                         Range]: 

 

 



                           Height                    177.8 cm



                                         (19 6:24 AM)

 

 



                           Weight                    150 kg



                                         (19 6:24 AM)

 

 



                           Body Mass Index           47.45 m2



                                         (19 6:24 AM)







Problem List





    



              Condition    Effective Dates  Status       Health Status  Informan

t

 

    



                           Anemia(Confirmed)         Resolved  

 

    



                           Coagulopathy(Confirm      Active  



                                         ed)    

 

    



                           Morbid                    Active  



                                         obesity(Confirmed)    

 

    



                           Pre-eclampsia(Confir      Active  



                                         med)    

 

    



                     Pre-eclampsia(2014                Resolved  



                                         med)    

 

    



                     Pregnant(Confirmed)  04 - 05   Resolved  

 

    



                     Pregnant(Confirmed)  06 - 3/29/07    Resolved  

 

    



                     Pregnant(Confirmed)  10/3/14 - 1/11/15   Resolved  







Allergies, Adverse Reactions, Alerts





   



                 Substance       Reaction        Severity        Status

 

   



                           codeine                   Active

 

   



                           Zofran                    Active







Medications





No Known Medications



Results





No data available for this section



Immunizations





Given and Recorded



   



                 Vaccine         Date            Status          Refusal Reason

 

   



                     measles/mumps/rubella virus vaccine  1/12/15             Gi

dorian 







Procedures





    



              Procedure    Date         Related Diagnosis  Body Site    Status

 

    



                            section          Completed

 

    



                           Tubal ligation            Completed







Social History





 



                           Social History Type       Response

 

 



                           Sexual                    Sexually active: Yes.  Part

ner with STD? No.  History of sexual abuse: 

No.

 

 



                           Smoking Status            Never smoker; Type: Cigaret

kel; Exposure to Tobacco Smoke None;

Cigarette



                                         Smoking Last 365 Days No; Reg Smoking C

essation Counseling No



                                         entered on: 19







Assessment and Plan





No data available for this section

## 2020-05-24 NOTE — XMS REPORT
Summary of Care

                             Created on: 2020



WANDA ARITA

External Reference #: [id number]

: 1990

Sex: Female



Demographics





                          Address                   [address] [place]  [address]

 

                          Preferred Language        English

 

                          Marital Status            Unknown

 

                          Oriental orthodox Affiliation     Unknown

 

                          Race                      White

 

                          Ethnic Group              Non-





Author





                          Author                    [facility]

 

                          Organization              [facility]

 

                          Address                   [address] [place]  [address]



 

                          Phone                     Unavailable







Care Team Providers





                    Care Team Member Name Role                Phone

 

                    SONIA ANTOINE,  FEROZ Unavailable         Unavailable

 

                    KAMALJIT BRANDT,  ALEJANDRO ARROYO  Unavailable         Unavailable

 

                    Jordan BRANDT,  Jef Unavailable         Unavailable

 

                    ASHELY BRANDT [place],  JONG Unavailable         Unavailable

 

                    TERESA BRANDT,  PABLO WALKER Unavailable         Unavailable

 

                    KAILEY BRANDT,  JAMEY ARROOY Unavailable         Unavailable

 

                    Mark BRANDT,  Salvador Unavailable         Unavailable

 

                          Unavailable               Unavailable







Functional Status





                    Name                Dates               Details

 

                                        Functional status health issues are not 

documented

                                                    Status: 







                    Name                Dates               Details

 

                                        Cognitive status health issues are not d

ocumented

                                                    Status: 







Problems





                    Name                Dates               Details

 

                                        Constipation (564.00, K59.00) 

                                                    Status: Active

 

                                        Elevated blood pressure reading without 

diagnosis of hypertension (796.2, R03.0)



                                                    Status: Active

 

                                        Morbid obesity with body mass index (BMI

) of 45.0 to 49.9 in adult (278.01, 

E66.01) 

                                                    Status: Active

 

                                        Acid reflux (530.81, K21.9) 

                                                    Status: Active

 

                                        Menorrhagia with irregular cycle (626.2,

 N92.1) 

                                                    Status: Active

 

                                        Type 2B von Willebrand's disease (286.4,

 D68.0) 

                                                    Status: Active

 

                                        Former smoker (V15.82, Z87.891) 

                                                    Status: Active

 

                                        Encounter for screening examination for 

sexually transmitted disease (V74.5, 

Z11.3) 

                                                    Status: Active

 

                                        Encounter for blood test for routine gen

eral physical examination (V72.62, 

Z00.00) 

                                                    Status: Active

 

                                        Encounter for routine gynecological exam

ination with Papanicolaou smear of 

cervix (V72.31, Z01.419) 

                                                    Status: Active

 

                                        Breast mass in female (611.72, N63.0) 

                                                    Status: Active

 

                                        Pelvic pain in female (625.9, R10.2) 

                                                    Status: Active

 

                                        Epistaxis (784.7, R04.0) 

                                                    Status: Active

 

                                        General counseling and advice for contra

ceptive management (V25.09, Z30.09) 

                                                    Status: Active

 

                                        Morbid obesity (278.01, E66.01) 

                                                    Status: Active

 

                                        Need for rubella vaccination (V04.3, Z23

) 

                                                    Status: Active







Medications





                    Name                Dates               Details

 

                                        Tranexamic Acid 650 MG Oral Tablet

Take 2 tablets po every 8 hours starting on day 1 of menses for a total of 5 
days



 

                                         Quantity: 30









FEROZ SCOTT N.P. * 



                                         Start : 18-Sep-2019





Active

Iron 100 Plus 100-250-0.025-1 MG Oral Tablet

* 



                                         Refills: 0







* 



                                         Start : 2020





Active





Allergies and Adverse Reactions





                    Name                Dates               Details

 

                    Codeine Derivatives (Allergy)                     Status: Ac

tive



 

                    Zofran TABS (Allergy)                     Reaction: Rash, It

anival

Status: Active









Past Medical History





                    Name                Dates               Details

 

                                        History of Anemia complicating pregnancy

 (648.20, O99.019) 

                                                    Status: Resolved

 

                                        History of High-risk pregnancy supervisi

on (V23.9, O09.90) 

                                                    Status: Resolved

 

                                        History of hyperemesis gravidarum (V13.2

9, Z87.59) 

                                                    Status: Resolved

 

                                        History of Hypertension complicating pre

gnancy, unspecified trimester (642.90, 

O16.9) 

                                                    Status: Resolved

 

                                        History of MTHFR mutation (V12.3, Z15.89

) 

                                                    Status: Resolved

 

                                        History of nausea (V12.79, Z87.898) 

                                                    Status: Resolved

 

                                        History of Nexplanon insertion (V25.5, Z

30.017) 

                                                    Status: Resolved







Procedures





                    Procedure           Dates               Details

 

                    [LH] TSH+Free T4    Date: 2020     

 

                    [QH] HIV AB, HIV 1/2, EIA, WITH REFLEXES Date: 2020   

  

 

                    . [facility] - PAP      Date: 2020     

 

                    . [facility] - Affirm VPIII (BV Panel) Date: 2020     

 

                    US Breast  Unilat 09440 Date: 2020     

 

                    CT Abdomen/Pelvis w/wo contrast 81564 Date: 2020     

 

                    MA Digital Mammo DX Charlie  Date: 2020     

 

                    US Pelvis with Pelvis Transvaginal 56243 Date: 2020   

  

 

                    History of  Section                     Completed 



 

                    History of Tubal Ligation                     Completed 









Immunization





                    Name                Dates               Details

 

                                        Fluzone Quadrivalent 0.5 ML Intramuscula

r Suspension #1

Lot #: [id number]            on: 3-Sep-2014             

 

                                        Tdap (Adacel) #1

Lot #: [id number]            on: 3-Sep-2014             

 

                                        Tdap (Boostrix) #1

Lot #: [id number]            on: 2014            







Family History





                    Name                Dates               Details

 

                                        Family history of essential hypertension

 (V17.49, Z82.49) 

                                                    Status: Active

 

                                        Family history of thyroid disease (V18.1

9, Z83.49) 

                                                    Status: Active

 

                                        Family history of type 2 diabetes [facility] (V18.0, Z83.3) 

                                                    Status: Active







Social History





                    Name                Dates               Details

 

                                        -

                                                    Status: 







                    Name                Dates               Details

 

                    Former smoker                            

 

                    Former smoker                            







Vital Signs





                Date            Test            Result          Details

 

                                                                 

 

                                        :55

                    BP Systolic         137 mm[Hg]          Status: Comments: Lo

cation: RUE; Position: Sitting



 

                    BP Diastolic        86 mm[Hg]           Status: Comments: Lo

cation: RUE; Position: Sitting



 

                    Height              70 in               Status: 



 

                    Weight              333.375 lb          Status: 



 

                    Body Mass Index Calculated 47.83 kg/m2         Status: 



 

                    Body Surface Area Calculated 2.59 m2             Status: 



 

                    Temperature         98.3 f              Status: Comments: Me

thod: Oral



 

                    Heart Rate          76 /min             Status: 









Results





                Date            Description     Value           Details

 

                    :05     [QLH] LIPID PANEL    

 

                                CHOLESTEROL, TOTAL 184  mg/dl (Normal) Range: <2

00





 

                                HDL CHOLESTEROL 47  mg/dl (Below low threshold) 

Range: >50





 

                                TRIGLYCERIDES   155  mg/dl (Above high threshold

) Range: <150





 

                                LDL-CHOLESTEROL 110  {MG/DL__CAL} (Above high th

reshold) Comments: Reference 

range: <100 Desirable range <100 mg/dL for primary prevention;  <70 mg/dL for 
patients with CHD or diabetic patients with > or = 2 CHD risk factors. LDL-C is 
now calculated using the Falguni calculation, which is a validated novel 
method providing better accuracy than the Friedewald equation in the estimation 
of LDL-C. Derek TIRADO et al. DAVE. 2013;310(19): 1165-8006 (http
://education.[employer].com/faq/SRJ114)



 

                                CHOL/HDLC RATIO 3.9  {CALC} (Normal) Range: <5.0





 

                                NON HDL CHOLESTEROL 137  {MG/DL__CAL} (Above hig

h threshold) Range: <130

Comments: For patients with diabetes plus 1 major ASCVD risk factor, treating to
 a non-HDL-C goal of <100 mg/dL (LDL-C of <70 mg/dL) is considered a therapeutic
 option.



 

                          [id number]           [Q] HIV-1/2 Antigen and Anti

bodies, Fourth Generation, with 

Reflexes                                 

 

                                HIV AG/AB, 4TH GEN NON-REACTIVE   (Normal) Range

: NON-REACTIVE

Comments: HIV-1 antigen and HIV-1/HIV-2 antibodies were notdetected. There is no
 laboratory evidence of HIVinfection. PLEASE NOTE: This information has been 
disclosed toyou from records whose confidentiality may beprotected by state law.
  If your state requires suchprotection, then the state law prohibits you 
frommaking any further disclosure of the informationwithout the specific written
 consent of the personto whom it pertains, or as otherwise permitted by law.A 
general authorization for the release of medical orother information is NOT 
sufficient for this purpose.  For additional information please refer t
Local [employer]et[employer]://education.[employer]/faq/EFH239(This link is being provided 
for informational/educational purposes only.)  The performance of this assay has
 not been clinicallyvalidated in patients less than 2 years old.



 

                    [id number]     [QLH] CMP W/EGFR     

 

                                GLUCOSE         84  mg/dl (Normal) Range: 65-99

Comments: Fasting reference interval



 

                                UREA NITROGEN (BUN) 12  mg/dl (Normal) Range: 7-

25





 

                                CREATININE      0.68  mg/dl (Normal) Range: 0.50

-1.10





 

                                eGFR NON- 117  {ML/MIN/1.7} (Nor

mal) Range: > OR = 60





 

                                eGFR  135  {ML/MIN/1.7} (Normal)

 Range: > OR = 60





 

                                BUN/CREATININE RATIO NOT APPLICABLE  {CALC} Rang

e: 6-22





 

                                SODIUM          141  mmol/L (Normal) Range: 135-

146





 

                                POTASSIUM       3.9  mmol/L (Normal) Range: 3.5-

5.3





 

                                CHLORIDE        104  mmol/L (Normal) Range: 98-1

10





 

                                CARBON DIOXIDE  25  mmol/L (Normal) Range: 20-32





 

                                CALCIUM         9.3  mg/dl (Normal) Range: 8.6-1

0.2





 

                                PROTEIN, TOTAL  7.6  g/dl (Normal) Range: 6.1-8.

1





 

                                ALBUMIN         3.9  g/dl (Normal) Range: 3.6-5.

1





 

                                GLOBULIN        3.7  {G/DL__CALC} (Normal) Range

: 1.9-3.7





 

                                ALBUMIN/GLOBULIN RATIO 1.1  {CALC} (Normal) Rang

e: 1.0-2.5





 

                                BILIRUBIN, TOTAL 0.7  mg/dl (Normal) Range: 0.2-

1.2





 

                                ALKALINE PHSPHATASE 77  u/l (Normal) Range: 33-1

15





 

                                AST             54  u/l (Above high threshold) R

michaela: 10-30





 

                                ALT             47  u/l (Above high threshold) R

michaela: 6-29





 

                    :05     [QL] CBC (INCLUDES DIFF/PLT)   

 

                                WHITE BLOOD CELL COUNT 8.1  {Thousand/u} (Normal

) Range: 3.8-10.8





 

                                RED BLOOD CELL COUNT 4.66  {Million/uL} (Normal)

 Range: 3.80-5.10





 

                                HEMAGLOBIN      11.9  g/dl (Normal) Range: 11.7-

15.5





 

                                HEMATOCRIT      37.7  % (Normal) Range: 35.0-45.

0





 

                                MCV             80.9  fL (Normal) Range: 80.0-10

0.0





 

                                MCH             25.5  pg (Below low threshold) R

michaela: 27.0-33.0





 

                                MCHC            31.6  g/dl (Below low threshold)

 Range: 32.0-36.0





 

                                RDW             14.5  % (Normal) Range: 11.0-15.

0





 

                                PLATELET COUNT  283  {Thousand/u} (Normal) Range

: 140-400





 

                                MPV             11.4  fL (Normal) Range: 7.5-12.

5





 

                                ABSOLUTE NEUTROPHILS 4957  {cells/uL} (Normal) R

michaela: 5958-0023





 

                                ABSOLUTE LYMPHOCYTES 2252  {cells/uL} (Normal) R

michaela: 850-3900





 

                                ABSOLUTE MONOCYTES 510  {cells/uL} (Normal) Rang

e: 200-950





 

                                ABSOLUTE EOSINOPHILS 332  {cells/uL} (Normal) Ra

nge: 





 

                                ABSOLUTE BASOPHILS 49  {cells/uL} (Normal) Range

: 0-200





 

                                        NEUTROPHILS         61.2  % (Normal)  

 

                                        LYMPHOCYTES         27.8  % (Normal)  

 

                                        MONOCYTES           6.3  % (Normal)  

 

                                        EOSINOPHILS         4.1  % (Normal)  

 

                                        BASOPHILS           0.6  % (Normal)  

 

                    :05     [QH] HEPATITIS B SURFACE ANTIGEN W/REFL 

CONFIRM   

 

                                HEPATITIS B SURFACE ANTIGEN NON-REACTIVE   (Norm

al) Range: NON-REACTIVE





 

                    :05     [QL] HEPATITIS C ANTIBODY   

 

                                HEPATITIS C ANTIBODY NON-REACTIVE   (Normal) Ran

ge: NON-REACTIVE





 

                                SIGNAL TO CUT-OFF 0.01   (Normal) Range: <1.00

Comments: HCV antibody was non-reactive. There is no laboratory evidence of HCV 
infection. In most cases, no further action is required. However,if recent HCV 
exposure is suspected, a test for HCV RNA(test code 99244) is suggested. For 
additional information please refer 
tohttp://[employer]/faq/YBK50g5(This link is being provided 
for informational/educational purposes only.)



 

                    :05     [QL] T4, FREE       

 

                                T4, FREE        1.1  ng/dl (Normal) Range: 0.8-1

.8





 

                    :     [[facility]] TSH, 3RD GENERATION   

 

                                TSH             1.71  {MIU/L} (Normal) Comments:

 Reference Range                     > or 

= 20 Years  0.40-4.50                          Pregnancy Ranges          First 
trimester    0.26-2.66          Second trimester   0.55-2.73          Third 
trimester    0.43-2.91



 

                    :     [[facility]] HEMOGLOBIN A1c   

 

                                HEMOGLOBIN A1c  5.9  {%_of_total} (Above high th

reshold) Range: <5.7

Comments: For someone without known diabetes, a hemoglobin A1c value between 
5.7% and 6.4% is consistent withprediabetes and should be confirmed with a 
follow-up test. For someone with known diabetes, a value &lt;7%indicates that 
their diabetes is well controlled. [id number] should be individualized based on 
duration ofdiabetes, age, comorbid conditions, and otherconsiderations. This 
assay result is consistent with an increased riskof diabetes. Currently, no 
consensus exists regarding use ofhemoglobin A1c for diagnosis of diabetes for 
children.



 

                    :05     [[facility]] RPR            

 

                                RPR (MONITOR) W/REFL TITER (REFL) NON-REACTIVE  

 (Normal) Range: NON-REACTIVE











Plan of Care





                    Name                Dates               Details

 

                                        Planned Observations 

 

                    Planned Goals not documented                      

 

                                        Planned Encounters 

 

                                        Appointment; ADULT, HEMOPHILIA

                                        On: 2020 12:00



 

                                        Appointment; JONG LUND M.D.

                                        On: 2020 15:00



 

                                        Appointment; ADULT, HEMOPHILIA

                                        On: 3-Jeremy-2020 9:00









Instructions





                    Name                Dates               Details

 

                                        Instructions not documented

                                                     







Encounters





                                        Appointment; PABLO MOORE M.D. 

Encounter Diagnosis: Problem not documented

                                        On: 2018 13:30



 

                                        Appointment; [address] 

Encounter Diagnosis: Problem not documented

                                        On: 2018 14:00



 

                                        Appointment; PABLO MOORE M.D. 

Encounter Diagnosis: Problem not documented

                                        On: 2019 8:00



 

                                        Appointment; SALVADOR LOPEZ M.D . 

Encounter Diagnosis: Problem not documented

                                        On: 2019 9:00



 

                                        Appointment; ADULT, HEMOPHILIA 

Encounter Diagnosis: Problem not documented

                                        On: 18-Sep-2019 9:00



 

                                        Appointment; ADULT, HEMOPHILIA 

Encounter Diagnosis: Problem not documented

                                        On: 4-Dec-2019 9:00



 

                                        Appointment; JONG LUND M.D. 

Encounter Diagnosis: Problem not documented

                                        On: 2020 14:15



 

                                        Appointment; ADULT, HEMOPHILIA 

Encounter Diagnosis: Problem not documented

                                        On: 3-Richi-2020 9:00

## 2020-05-24 NOTE — XMS REPORT
Summary of Care

                             Created on: 2020



WANDA ARITA

External Reference #: [id number]

: 1990

Sex: Female



Demographics





                          Address                   [address] [place] [address]

 

                          Preferred Language        English

 

                          Marital Status            Unknown

 

                          Anabaptism Affiliation     Unknown

 

                          Race                      White

 

                          Ethnic Group              Non-





Author





                          Author                    WANDA Ward R.N. Summer

 

                          Organization              Unknown

 

                          Address                   [facility]



 

                          Phone                     Unavailable







Care Team Providers





                    Care Team Member Name Role                Phone

 

                    Sylvia HERNÁNDEZ,  Summer Unavailable         Unavailable

 

                    SONIA ANTOINE,  FEROZ Unavailable         Unavailable

 

                    KAMALJIT BRANDT,  ALEJANDRO ARROYO  Unavailable         Unavailable

 

                    Jordan BRANDT,  Jef Unavailable         Unavailable

 

                    ASHELY BRANDT [place],  JONG Unavailable         Unavailable

 

                    TERESA BRANDT,  PABLO WALKER Unavailable         Unavailable

 

                    KAILEY BRANDT,  JAMEY ARROYO Unavailable         Unavailable

 

                    Mark BRANDT,  Salvador Unavailable         Unavailable

 

                          Unavailable               Unavailable







Functional Status





                    Name                Dates               Details

 

                                        Functional status health issues are not 

documented

                                                    Status: 







                    Name                Dates               Details

 

                                        Cognitive status health issues are not d

ocumented

                                                    Status: 







Problems





                    Name                Dates               Details

 

                                        Need for rubella vaccination (V04.3, Z23

) 

                                                    Status: Active

 

                                        Constipation (564.00, K59.00) 

                                                    Status: Active

 

                                        Morbid obesity (278.01, E66.01) 

                                                    Status: Active

 

                                        General counseling and advice for contra

ceptive management (V25.09, Z30.09) 

                                                    Status: Active

 

                                        Elevated blood pressure reading without 

diagnosis of hypertension (796.2, R03.0)



                                                    Status: Active

 

                                        Morbid obesity with body mass index (BMI

) of 45.0 to 49.9 in adult (278.01, 

E66.01) 

                                                    Status: Active

 

                                        Acid reflux (530.81, K21.9) 

                                                    Status: Active

 

                                        Epistaxis (784.7, R04.0) 

                                                    Status: Active

 

                                        Menorrhagia with irregular cycle (626.2,

 N92.1) 

                                                    Status: Active

 

                                        Type 2B von Willebrand's disease (286.4,

 D68.0) 

                                                    Status: Active

 

                                        Former smoker (V15.82, Z87.891) 

                                                    Status: Active

 

                                        Encounter for screening examination for 

sexually transmitted disease (V74.5, 

Z11.3) 

                                                    Status: Active

 

                                        Encounter for blood test for routine gen

eral physical examination (V72.62, 

Z00.00) 

                                                    Status: Active

 

                                        Encounter for routine gynecological exam

ination with Papanicolaou smear of 

cervix (V72.31, Z01.419) 

                                                    Status: Active

 

                                        Breast mass in female (611.72, N63.0) 

                                                    Status: Active

 

                                        Pelvic pain in female (625.9, R10.2) 

                                                    Status: Active

 

                                        Follow up (V67.9, Z09) 

                                                    Status: Active

 

                                        History of  section, unknown sca

r (V45.89, Z98.891) 

                                                    Status: Active







Medications





                    Name                Dates               Details

 

                                        Tranexamic Acid 650 MG Oral Tablet

Take 2 tablets po every 8 hours starting on day 1 of menses for a total of 5 
days



 

                                         Quantity: 30









SONIA KHAN.FEROZ CLARK * 



                                         Start : 18-Sep-2019





Active





Allergies and Adverse Reactions





                    Name                Dates               Details

 

                    Codeine Derivatives (Allergy)                     Status: Ac

tive



 

                    Zofran TABS (Allergy)                     Reaction: Rash, It

anival

Status: Active









Past Medical History





                    Name                Dates               Details

 

                                        History of Anemia complicating pregnancy

 (648.20, O99.019) 

                                                    Status: Resolved

 

                                        History of High-risk pregnancy supervisi

on (V23.9, O09.90) 

                                                    Status: Resolved

 

                                        History of hyperemesis gravidarum (V13.2

9, Z87.59) 

                                                    Status: Resolved

 

                                        History of Hypertension complicating pre

gnancy, unspecified trimester (642.90, 

O16.9) 

                                                    Status: Resolved

 

                                        History of MTHFR mutation (V12.3, Z15.89

) 

                                                    Status: Resolved

 

                                        History of nausea (V12.79, Z87.898) 

                                                    Status: Resolved

 

                                        History of Nexplanon insertion (V25.5, Z

30.017) 

                                                    Status: Resolved







Procedures





                    Procedure           Dates               Details

 

                    [LH] TSH+Free T4    Date: 2020     

 

                    . [facility] - Affirm VPIII (BV Panel) Date: 2020     

 

                    . [facility] - PAP      Date: 2020     

 

                    [QH] HIV AB, HIV 1/2, EIA, WITH REFLEXES Date: 2020   

  

 

                    US Pelvis with Pelvis Transvaginal 02625 Date: 2020   

  

 

                    MA Digital Mammo DX Charlie  Date: 2020     

 

                    US Breast  Unilat 56962 Date: 2020     

 

                    CT Abdomen/Pelvis w/wo contrast [id number] Date: 2020     

 

                    History of  Section                     Completed 



 

                    History of Tubal Ligation                     Completed 









Immunization





                    Name                Dates               Details

 

                                        Fluzone Quadrivalent 0.5 ML Intramuscula

r Suspension #1

Lot #: [id number]            on: 3-Sep-2014             

 

                                        Tdap (Adacel) #1

Lot #: [id number]            on: 3-Sep-2014             

 

                                        Tdap (Boostrix) #1

Lot #: [id number]            on: 2014            







Family History





                    Name                Dates               Details

 

                                        Family history of essential hypertension

 (V17.49, Z82.49) 

                                                    Status: Active

 

                                        Family history of thyroid disease (V18.1

9, Z83.49) 

                                                    Status: Active

 

                                        Family history of type 2 diabetes [facility] (V18.0, Z83.3) 

                                                    Status: Active







Social History





                    Name                Dates               Details

 

                                        -

                                                    Status: 







                    Name                Dates               Details

 

                    Former smoker                            

 

                    Former smoker                            







Vital Signs





                Date            Test            Result          Details

 

                                                                 

 

                                        [id number]

                    BP Systolic         135 mm[Hg]          Status: Comments: Lo

cation: RUE; Position: Sitting



 

                    BP Diastolic        86 mm[Hg]           Status: Comments: Lo

cation: RUE; Position: Sitting



 

                    Height              70 in               Status: 



 

                    Weight              338 lb              Status: 



 

                    Body Mass Index Calculated 48.5 kg/m2          Status: 



 

                    Body Surface Area Calculated 2.61 m2             Status: 



 

                    Temperature         98.5 f              Status: Comments: Me

thod: Oral



 

                    Heart Rate          88 /min             Status: 









Results





                Date            Description     Value           Details

 

                    Results not documented                      

 

                                                                 







Plan of Care





                    Name                Dates               Details

 

                                        Planned Observations 

 

                    Planned Goals not documented                      

 

                                        Planned Encounters 

 

                                        Appointment; ADULT, HEMOPHILIA

                                        On: 3-Jeremy-2020 9:00









Interventions Provided

Follow-ups/Referrals* Surgery Referral; To Be Done: 2020





Instructions





                    Name                Dates               Details

 

                                        Instructions not documented

                                                     







Encounters





                                        Appointment; PABLO MOORE M.D. 

Encounter Diagnosis: Problem not documented

                                        On: 2018 13:30



 

                                        Appointment; [address] 

Encounter Diagnosis: Problem not documented

                                        On: 2018 14:00



 

                                        Appointment; PABLO MOORE M.D. 

Encounter Diagnosis: Problem not documented

                                        On: 2019 8:00



 

                                        Appointment; SALVADOR LOPEZ M.D . 

Encounter Diagnosis: Problem not documented

                                        On: 2019 9:00



 

                                        Appointment; ADULT, HEMOPHILIA 

Encounter Diagnosis: Problem not documented

                                        On: 18-Sep-2019 9:00



 

                                        Appointment; ADULT, HEMOPHILIA 

Encounter Diagnosis: Problem not documented

                                        On: 4-Dec-2019 9:00



 

                                        Appointment; JONG LUND M.D. 

Encounter Diagnosis: Problem not documented

                                        On: 2020 14:15



 

                                        Appointment; ADULT, HEMOPHILIA 

Encounter Diagnosis: Problem not documented

                                        On: 3-Richi-2020 9:00



 

                                        Appointment; ADULT, HEMOPHILIA 

Encounter Diagnosis: Problem not documented

                                        On: 2020 12:00



 

                                        Appointment; JONG LUND M.D. 

Encounter Diagnosis: Problem not documented

                                        On: 2020 13:30

## 2020-05-24 NOTE — XMS REPORT
Summary of Care

                             Created on: 2020



WANDA ARITA

External Reference #: [id number]

: 1990

Sex: Female



Demographics





                          Address                   [address] [place]  [address]

 

                          Preferred Language        English

 

                          Marital Status            Unknown

 

                          Church Affiliation     Unknown

 

                          Race                      White

 

                          Ethnic Group              Non-





Author





                          Author                    [facility]

 

                          Organization              [facility]

 

                          Address                   [address] [place]  [address]



 

                          Phone                     Unavailable







Care Team Providers





                    Care Team Member Name Role                Phone

 

                    ASHELY CONROY,  JONG Unavailable         Unavailable

 

                    FEROZ ESQUIVEL Unavailable         Unavailable

 

                    ALEJANDRO MARI MD  Unavailable         Unavailable

 

                    Jef Ortega MD Unavailable         Unavailable

 

                    ASHELY BRANDT [place],  JONG Unavailable         Unavailable

 

                    TERESA BRANDT,  PABLO WALKER Unavailable         Unavailable

 

                    KAILEY BRANDT,  JAMEY ARROYO Unavailable         Unavailable

 

                    Mark BRANDT,  Salvador Unavailable         Unavailable

 

                          Unavailable               Unavailable







Functional Status





                    Name                Dates               Details

 

                                        Functional status health issues are not 

documented

                                                    Status: 







                    Name                Dates               Details

 

                                        Cognitive status health issues are not d

ocumented

                                                    Status: 







Problems





                    Name                Dates               Details

 

                                        Need for rubella vaccination (V04.3, Z23

) 

                                                    Status: Active

 

                                        Constipation (564.00, K59.00) 

                                                    Status: Active

 

                                        Morbid obesity (278.01, E66.01) 

                                                    Status: Active

 

                                        General counseling and advice for contra

ceptive management (V25.09, Z30.09) 

                                                    Status: Active

 

                                        Elevated blood pressure reading without 

diagnosis of hypertension (796.2, R03.0)



                                                    Status: Active

 

                                        Morbid obesity with body mass index (BMI

) of 45.0 to 49.9 in adult (278.01, 

E66.01) 

                                                    Status: Active

 

                                        Acid reflux (530.81, K21.9) 

                                                    Status: Active

 

                                        Epistaxis (784.7, R04.0) 

                                                    Status: Active

 

                                        Menorrhagia with irregular cycle (626.2,

 N92.1) 

                                                    Status: Active

 

                                        Type 2B von Willebrand's disease (286.4,

 D68.0) 

                                                    Status: Active

 

                                        Former smoker (V15.82, Z87.891) 

                                                    Status: Active

 

                                        Encounter for screening examination for 

sexually transmitted disease (V74.5, 

Z11.3) 

                                                    Status: Active

 

                                        Encounter for blood test for routine gen

eral physical examination (V72.62, 

Z00.00) 

                                                    Status: Active

 

                                        Encounter for routine gynecological exam

ination with Papanicolaou smear of 

cervix (V72.31, Z01.419) 

                                                    Status: Active

 

                                        Breast mass in female (611.72, N63.0) 

                                                    Status: Active

 

                                        Pelvic pain in female (625.9, R10.2) 

                                                    Status: Active

 

                                        Follow up (V67.9, Z09) 

                                                    Status: Active

 

                                        History of  section, unknown sca

r (V45.89, Z98.891) 

                                                    Status: Active

 

                                        Bacterial vaginosis (616.10, N76.0) 

                                                    Status: Active







Medications





                    Name                Dates               Details

 

                                        Tranexamic Acid 650 MG Oral Tablet

Take 2 tablets po every 8 hours starting on day 1 of menses for a total of 5 
days



 

                                         Quantity: 30









FEROZ ESQUIVEL * 



                                         Start : 18-Sep-2019





Active

metroNIDAZOLE 500 MG Oral Tablet

TAKE ONE TABLET BY MOUTH TWICE A DAY . DO NOT DRINK ALCOHOL

* 



                           Quantity: 14              Refills: 0









JONG LUND M.D. * 



                                         Start : 5-Mar-2020





Active





Allergies and Adverse Reactions





                    Name                Dates               Details

 

                    Codeine Derivatives (Allergy)                     Status: Ac

tive



 

                    Zofran TABS (Allergy)                     Reaction: Rash, It

anival

Status: Active









Past Medical History





                    Name                Dates               Details

 

                                        History of Anemia complicating pregnancy

 (648.20, O99.019) 

                                                    Status: Resolved

 

                                        History of High-risk pregnancy supervisi

on (V23.9, O09.90) 

                                                    Status: Resolved

 

                                        History of hyperemesis gravidarum (V13.2

9, Z87.59) 

                                                    Status: Resolved

 

                                        History of Hypertension complicating pre

gnancy, unspecified trimester (642.90, 

O16.9) 

                                                    Status: Resolved

 

                                        History of MTHFR mutation (V12.3, Z15.89

) 

                                                    Status: Resolved

 

                                        History of nausea (V12.79, Z87.898) 

                                                    Status: Resolved

 

                                        History of Nexplanon insertion (V25.5, Z

30.017) 

                                                    Status: Resolved







Procedures





                    Procedure           Dates               Details

 

                    History of  Section                     Completed 



 

                    History of Tubal Ligation                     Completed 









Immunization





                    Name                Dates               Details

 

                                        Fluzone Quadrivalent 0.5 ML Intramuscula

r Suspension #1

Lot #: [id number]            on: 3-Sep-2014             

 

                                        Tdap (Adacel) #1

Lot #: [id number]            on: 3-Sep-2014             

 

                                        Tdap (Boostrix) #1

Lot #: [id number]            on: 2014            







Family History





                    Name                Dates               Details

 

                                        Family history of essential hypertension

 (V17.49, Z82.49) 

                                                    Status: Active

 

                                        Family history of thyroid disease (V18.1

9, Z83.49) 

                                                    Status: Active

 

                                        Family history of type 2 diabetes [facility] (V18.0, Z83.3) 

                                                    Status: Active







Social History





                    Name                Dates               Details

 

                                        -

                                                    Status: 







                    Name                Dates               Details

 

                    Ex-smoker (finding)                      

 

                    Ex-smoker (finding)                      







Vital Signs





                Date            Test            Result          Details

 

                                                                 

 

                                        [id number]

                    Systolic blood pressure 135 mm[Hg]          Status: Comments

: Location: RUE; Position: 

Sitting



 

                    Diastolic blood pressure 86 mm[Hg]           Status: Comment

s: Location: RUE; Position: 

Sitting



 

                    Body height         70 in               Status: 



 

                    Weight              338 lb              Status: 



 

                    Body mass index (BMI) [Ratio] 48.5 kg/m2          Status: 



 

                    Body surface area Derived from formula 2.61 m2             S

tatus: 



 

                    Body temperature    98.5 f              Status: Comments: Me

thod: Oral



 

                    Heart Rate          88 /min             Status: 









Results





                Date            Description     Value           Details

 

                    Results not documented                      

 

                                                                 







Plan of Care





                    Name                Dates               Details

 

                                        Planned Observations 

 

                    Planned Goals not documented                      

 

                                        Planned Encounters 

 

                                        Appointment; ADULT, HEMOPHILIA

                                        On: 12-Mar-2020 8:00



 

                                        Appointment; ADULT, HEMOPHILIA

                                        On: 3-Jeremy-2020 9:00









Instructions





                    Name                Dates               Details

 

                                        Instructions not documented

                                                     







Encounters





                                        Appointment; PABLO MOORE M.D. 

Encounter Diagnosis: Problem not documented

                                        On: 2018 13:30



 

                                        Appointment; [address] 

Encounter Diagnosis: Problem not documented

                                        On: 2018 14:00



 

                                        Appointment; PABLO MOORE M.D. 

Encounter Diagnosis: Problem not documented

                                        On: 2019 8:00



 

                                        Appointment; SALVADOR LOPEZ M.D . 

Encounter Diagnosis: Problem not documented

                                        On: 2019 9:00



 

                                        Appointment; ADULT, HEMOPHILIA 

Encounter Diagnosis: Problem not documented

                                        On: 18-Sep-2019 9:00



 

                                        Appointment; ADULT, HEMOPHILIA 

Encounter Diagnosis: Problem not documented

                                        On: 4-Dec-2019 9:00



 

                                        Appointment; JONG LUND M.D. 

Encounter Diagnosis: Problem not documented

                                        On: 2020 14:15



 

                                        Appointment; ADULT, HEMOPHILIA 

Encounter Diagnosis: Problem not documented

                                        On: 3-Richi-2020 9:00



 

                                        Appointment; ADULT, HEMOPHILIA 

Encounter Diagnosis: Problem not documented

                                        On: 2020 12:00



 

                                        Appointment; JONG LUND M.D. 

Encounter Diagnosis: Problem not documented

                                        On: 2020 13:30

## 2020-05-24 NOTE — XMS REPORT
Summary of Care

                             Created on: 2020



WANDA ARITA

External Reference #: [id number]

: 1990

Sex: Female



Demographics





                          Address                   [address] [place] [address]

 

                          Preferred Language        English

 

                          Marital Status            Unknown

 

                          Confucianist Affiliation     Unknown

 

                          Race                      White

 

                          Ethnic Group              Non-





Author





                          Author                    WANDA ESQUIVEL

 

                          Organization              Unknown

 

                          Address                   Unknown

 

                          Phone                     Unavailable







Care Team Providers





                    Care Team Member Name Role                Phone

 

                    ASHELY CONROY,  JNOG Unavailable         Unavailable

 

                    FEROZ ESQUIVEL Unavailable         Unavailable

 

                    KAMALJIT BRANDT,  ALEJANDRO ARROYO  Unavailable         Unavailable

 

                    Jordan BRANDT,  Jef Unavailable         Unavailable

 

                    ASHELY BRANDT UT,  JONG Unavailable         Unavailable

 

                    TERESA BRANDT,  PABLO WALKER Unavailable         Unavailable

 

                    KAILEY BRANDT,  JAMEY ARROYO Unavailable         Unavailable

 

                    Mark BRANDT,  Salvador Unavailable         Unavailable

 

                          Unavailable               Unavailable







Functional Status





                    Name                Dates               Details

 

                                        Functional status health issues are not 

documented

                                                    Status: 







                    Name                Dates               Details

 

                                        Cognitive status health issues are not d

ocumented

                                                    Status: 







Problems





                    Name                Dates               Details

 

                                        Need for rubella vaccination (V04.3, Z23

) 

                                                    Status: Active

 

                                        Constipation (564.00, K59.00) 

                                                    Status: Active

 

                                        Morbid obesity (278.01, E66.01) 

                                                    Status: Active

 

                                        General counseling and advice for contra

ceptive management (V25.09, Z30.09) 

                                                    Status: Active

 

                                        Elevated blood pressure reading without 

diagnosis of hypertension (796.2, R03.0)



                                                    Status: Active

 

                                        Morbid obesity with body mass index (BMI

) of 45.0 to 49.9 in adult (278.01, 

E66.01) 

                                                    Status: Active

 

                                        Acid reflux (530.81, K21.9) 

                                                    Status: Active

 

                                        Epistaxis (784.7, R04.0) 

                                                    Status: Active

 

                                        Menorrhagia with irregular cycle (626.2,

 N92.1) 

                                                    Status: Active

 

                                        Type 2B von Willebrand's disease (286.4,

 D68.0) 

                                                    Status: Active

 

                                        Former smoker (V15.82, Z87.891) 

                                                    Status: Active

 

                                        Encounter for screening examination for 

sexually transmitted disease (V74.5, 

Z11.3) 

                                                    Status: Active

 

                                        Encounter for blood test for routine gen

eral physical examination (V72.62, 

Z00.00) 

                                                    Status: Active

 

                                        Encounter for routine gynecological exam

ination with Papanicolaou smear of 

cervix (V72.31, Z01.419) 

                                                    Status: Active

 

                                        Breast mass in female (611.72, N63.0) 

                                                    Status: Active

 

                                        Pelvic pain in female (625.9, R10.2) 

                                                    Status: Active

 

                                        Follow up (V67.9, Z09) 

                                                    Status: Active

 

                                        History of  section, unknown sca

r (V45.89, Z98.891) 

                                                    Status: Active

 

                                        Bacterial vaginosis (616.10, N76.0) 

                                                    Status: Active







Medications





                    Name                Dates               Details

 

                                        Tranexamic Acid 650 MG Oral Tablet

Take 2 tablets po every 8 hours starting on day 1 of menses for a total of 5 
days



 

                                         Quantity: 30









FEROZ ESQUIVEL * 



                                         Start : 18-Sep-2019





Active

metroNIDAZOLE 500 MG Oral Tablet

TAKE ONE TABLET BY MOUTH TWICE A DAY . DO NOT DRINK ALCOHOL

* 



                           Quantity: 14              Refills: 0









JONG LUND M.D. * 



                                         Start : 5-Mar-2020





Active





Allergies and Adverse Reactions





                    Name                Dates               Details

 

                    Codeine Derivatives (Allergy)                     Status: Ac

tive



 

                    Zofran TABS (Allergy)                     Reaction: Rash, It

anival

Status: Active









Past Medical History





                    Name                Dates               Details

 

                                        History of Anemia complicating pregnancy

 (648.20, O99.019) 

                                                    Status: Resolved

 

                                        History of High-risk pregnancy supervisi

on (V23.9, O09.90) 

                                                    Status: Resolved

 

                                        History of hyperemesis gravidarum (V13.2

9, Z87.59) 

                                                    Status: Resolved

 

                                        History of Hypertension complicating pre

gnancy, unspecified trimester (642.90, 

O16.9) 

                                                    Status: Resolved

 

                                        History of MTHFR mutation (V12.3, Z15.89

) 

                                                    Status: Resolved

 

                                        History of nausea (V12.79, Z87.898) 

                                                    Status: Resolved

 

                                        History of Nexplanon insertion (V25.5, Z

30.017) 

                                                    Status: Resolved







Procedures





                    Procedure           Dates               Details

 

                    History of  Section                     Completed 



 

                    History of Tubal Ligation                     Completed 









Immunization





                    Name                Dates               Details

 

                                        Fluzone Quadrivalent 0.5 ML Intramuscula

r Suspension #1

Lot #: [id number]            on: 3-Sep-2014             

 

                                        Tdap (Adacel) #1

Lot #: [id number]            on: 3-Sep-2014             

 

                                        Tdap (Boostrix) #1

Lot #: [id number]            on: 2014            







Family History





                    Name                Dates               Details

 

                                        Family history of essential hypertension

 (V17.49, Z82.49) 

                                                    Status: Active

 

                                        Family history of thyroid disease (V18.1

9, Z83.49) 

                                                    Status: Active

 

                                        Family history of type 2 diabetes [facility] (V18.0, Z83.3) 

                                                    Status: Active







Social History





                    Name                Dates               Details

 

                                        -

                                                    Status: 







                    Name                Dates               Details

 

                    Ex-smoker (finding)                      

 

                    Ex-smoker (finding)                      







Vital Signs





                Date            Test            Result          Details

 

                                                                 

 

                    No Known Vitals to report                      







Results





                Date            Description     Value           Details

 

                    Results not documented                      

 

                                                                 







Plan of Care





                    Name                Dates               Details

 

                                        Planned Observations 

 

                    Planned Goals not documented                      

 

                                        Planned Encounters 

 

                                        Appointment; ADULT, HEMOPHILIA

                                        On: 3-Jeremy-2020 9:00









Interventions Provided

Medication Changes* Tranexamic Acid 650 MG Oral Tablet - Renew





Instructions





                    Name                Dates               Details

 

                                        Instructions not documented

                                                     







Encounters





                                        Appointment; PABLO MOORE M.D. 

Encounter Diagnosis: Problem not documented

                                        On: 2018 13:30



 

                                        Appointment; [address] 

Encounter Diagnosis: Problem not documented

                                        On: 2018 14:00



 

                                        Appointment; PABLO MOORE M.D. 

Encounter Diagnosis: Problem not documented

                                        On: 2019 8:00



 

                                        Appointment; SALVADOR LOPEZ M.D . 

Encounter Diagnosis: Problem not documented

                                        On: 2019 9:00



 

                                        Appointment; ADULT, HEMOPHILIA 

Encounter Diagnosis: Problem not documented

                                        On: 18-Sep-2019 9:00



 

                                        Appointment; ADULT, HEMOPHILIA 

Encounter Diagnosis: Problem not documented

                                        On: 4-Dec-2019 9:00



 

                                        Appointment; JONG LUND M.D. 

Encounter Diagnosis: Problem not documented

                                        On: 2020 14:15



 

                                        Appointment; ADULT, HEMOPHILIA 

Encounter Diagnosis: Problem not documented

                                        On: 3-Richi-2020 9:00



 

                                        Appointment; ADULT, HEMOPHILIA 

Encounter Diagnosis: Problem not documented

                                        On: 2020 12:00



 

                                        Appointment; JONG LUND M.D. 

Encounter Diagnosis: Problem not documented

                                        On: 2020 13:30



 

                                        Appointment; ADULT, HEMOPHILIA 

Encounter Diagnosis: Problem not documented

                                        On: 12-Mar-2020 8:00

## 2020-05-24 NOTE — XMS REPORT
Clinical Summary

                             Created on: 2020



Karol Mobley

External Reference #: [id number]

: 1990

Sex: Female



Demographics





                          Address                   [address]

[place]  [address]

 

                          Home Phone                [phone]

 

                          Preferred Language        English

 

                          Marital Status            Single

 

                          M[employer]on Affiliation     [id number]

 

                          Race                      Unknown

 

                          Ethnic Group              /Latin





Author





                          Author                    [facility]

 

                          Organization              [facility]

 

                          Address                   Unknown

 

                          Phone                     Unavailable







Support





                Name            Relationship    Address         Phone

 

                Contact No      [employer]            Unknown         [phone]







Care Team Providers





                    Care Team Member Name Role                Phone

 

                    Kelsey Gore MD PCP                 [phone]







Allergies





                                        Comments



                 Active Allergy  Reactions       Severity        Noted Date 

 

                                        



vomiting



vomiting



                     Codeine             Other (See          2015 



                                         Comments), GI   



                                         Intolerance   

 

                                         



                 Ondansetron     Rash            Low             2015 

 

                                         



                 Ondansetron Hcl  Rash            Low             10/03/2016 







Medications

No known medications



Active Problems





 



                           Problem                   Noted Date

 

 



                           Excessive fetal growth affecting management of pregna

ncy, antepartum  

10/31/2017

 

 



                                         Overview:



                                         >95%ile at 28 weeks, growth scans

 

 



                           Obesity affecting pregnancy  10/20/2017

 

 



                                         Overview:



                                          testing and growth scan at 36

 weeks.

 

 



                           Family history of Down syndrome  2017

 

 



                           H/O maternal blood transfusion, currently pregnant  0

2017







Immunizations





  



                     Name                Administration Dates  Next Due

 

  



                           FLUZONE QUAD PF           2017 

 

  



                           Tdap                      2014, 2014 







Family History





   



                 Medical History  Relation        Name            Comments

 

   



                           No Known Problems         Father  

 

   



                           No Known Problems         Mother  







   



                 Relation        Name            Status          Comments

 

   



                                         Father   

 

   



                                         Mother   







Social History





                                        Date



                 Tobacco Use     Types           Packs/Day       Years Used 

 

                                        Quit: 



                           Former Smoker             Cigarettes   

 

    



                                         Smokeless Tobacco: Never   



                                         Used   







                    Drinks/Week         oz/Week             Comments



                                         Alcohol Use   

 

                                                             



                                         No   







 



                           Sex Assigned at Birth     Date Recorded

 

 



                                         Not on file 







                                        Industry



                           Job Start Date            Occupation 

 

                                        Not on file



                           Not on file               Not on file 







                                        Travel End



                           Travel History            Travel Start 

 





                                         No recent travel history available.







Last Filed Vital Signs

Not on file



Plan of Treatment





   



                 Health Maintenance  Due Date        Last Done       Comments

 

   



                           CERVICAL CANCER SCREENING  2009  

 

   



                     INFLUENZA VACCINE   2020 







Results

Not on fileafter 2019



Insurance





                                        Type



            Payer      Benefit    Subscriber ID  Effective  Phone      Address 



                           Plan /                    Dates   



                                         Group     

 

                                        PPO



                 BCBS            BCBS OUT        xxxxxxxxxxxx    2018-P   



                           OF STATE                  resent   

 

                                        HMO



                 [employer]        xxxxxxxxx       20

17-P   



                           [facility]     







     



            Guarantor Name  Account    Relation to  Date of    Phone      [place] Address



                     Type                Patient             Birth  

 

     



            Karol Mobley  Personal/F  Self       1990  [phone]

 [facility]               ([address])              [place] [address]







Advance Directives





For more information, please contact: [phone]







                          Patient Representative    Explanation



                           Type                      Date Recorded  

 

                                                     



                                         Advance Directives,   



                                         Living Will and   



                                         Medical Power of

## 2020-05-24 NOTE — XMS REPORT
Summary of Care

                             Created on: 2020



WANDA ARITA

External Reference #: [id number]

: 1990

Sex: Female



Demographics





                          Address                   [address] [place]  [address]

 

                          Preferred Language        English

 

                          Marital Status            Unknown

 

                          Yarsanism Affiliation     Unknown

 

                          Race                      White

 

                          Ethnic Group              Non-





Author





                          Author                    [facility]

 

                          Organization              [facility]

 

                          Address                   [address] [place]  [address]



 

                          Phone                     Unavailable







Care Team Providers





                    Care Team Member Name Role                Phone

 

                    SONIA ANTOINE,  FEROZ Unavailable         Unavailable

 

                    KAMALJIT BRANDT,  ALEJANDRO ARROYO  Unavailable         Unavailable

 

                    Jordan BRANDT,  Jef Unavailable         Unavailable

 

                    ASHELY BRANDT [place],  JONG Unavailable         Unavailable

 

                    TERESA BRANDT,  PABLO WALKER Unavailable         Unavailable

 

                    KAILEY BRANDT,  JAMEY ARROYO Unavailable         Unavailable

 

                    Mark BRANDT,  Salvador Unavailable         Unavailable

 

                          Unavailable               Unavailable







Functional Status





                    Name                Dates               Details

 

                                        Functional status health issues are not 

documented

                                                    Status: 







                    Name                Dates               Details

 

                                        Cognitive status health issues are not d

ocumented

                                                    Status: 







Problems





                    Name                Dates               Details

 

                                        Need for rubella vaccination (V04.3, Z23

) 

                                                    Status: Active

 

                                        Constipation (564.00, K59.00) 

                                                    Status: Active

 

                                        Morbid obesity (278.01, E66.01) 

                                                    Status: Active

 

                                        General counseling and advice for contra

ceptive management (V25.09, Z30.09) 

                                                    Status: Active

 

                                        Elevated blood pressure reading without 

diagnosis of hypertension (796.2, R03.0)



                                                    Status: Active

 

                                        Morbid obesity with body mass index (BMI

) of 45.0 to 49.9 in adult (278.01, 

E66.01) 

                                                    Status: Active

 

                                        Acid reflux (530.81, K21.9) 

                                                    Status: Active

 

                                        Epistaxis (784.7, R04.0) 

                                                    Status: Active

 

                                        Menorrhagia with irregular cycle (626.2,

 N92.1) 

                                                    Status: Active

 

                                        Type 2B von Willebrand's disease (286.4,

 D68.0) 

                                                    Status: Active

 

                                        Former smoker (V15.82, Z87.891) 

                                                    Status: Active

 

                                        Encounter for screening examination for 

sexually transmitted disease (V74.5, 

Z11.3) 

                                                    Status: Active

 

                                        Encounter for blood test for routine gen

eral physical examination (V72.62, 

Z00.00) 

                                                    Status: Active

 

                                        Encounter for routine gynecological exam

ination with Papanicolaou smear of 

cervix (V72.31, Z01.419) 

                                                    Status: Active

 

                                        Breast mass in female (611.72, N63.0) 

                                                    Status: Active

 

                                        Pelvic pain in female (625.9, R10.2) 

                                                    Status: Active

 

                                        Follow up (V67.9, Z09) 

                                                    Status: Active







Medications





                    Name                Dates               Details

 

                                        Tranexamic Acid 650 MG Oral Tablet

Take 2 tablets po every 8 hours starting on day 1 of menses for a total of 5 
days



 

                                         Quantity: 30









ORI SCOTT N.P.ANNA * 



                                         Start : 18-Sep-2019





Active





Allergies and Adverse Reactions





                    Name                Dates               Details

 

                    Codeine Derivatives (Allergy)                     Status: Ac

tive



 

                    Zofran TABS (Allergy)                     Reaction: Rash, It

anival

Status: Active









Past Medical History





                    Name                Dates               Details

 

                                        History of Anemia complicating pregnancy

 (648.20, O99.019) 

                                                    Status: Resolved

 

                                        History of High-risk pregnancy supervisi

on (V23.9, O09.90) 

                                                    Status: Resolved

 

                                        History of hyperemesis gravidarum (V13.2

9, Z87.59) 

                                                    Status: Resolved

 

                                        History of Hypertension complicating pre

gnancy, unspecified trimester (642.90, 

O16.9) 

                                                    Status: Resolved

 

                                        History of MTHFR mutation (V12.3, Z15.89

) 

                                                    Status: Resolved

 

                                        History of nausea (V12.79, Z87.898) 

                                                    Status: Resolved

 

                                        History of Nexplanon insertion (V25.5, Z

30.017) 

                                                    Status: Resolved







Procedures





                    Procedure           Dates               Details

 

                    [LH] TSH+Free T4    Date: 2020     

 

                    . [facility] - Affirm VPIII (BV Panel) Date: 2020     

 

                    . [facility] - PAP      Date: 2020     

 

                    [QH] HIV AB, HIV 1/2, EIA, WITH REFLEXES Date: 2020   

  

 

                    US Pelvis with Pelvis Transvaginal 22167 Date: 2020   

  

 

                    MA Digital Mammo DX Charlie  Date: 2020     

 

                    US Breast  Unilat 39955 Date: 2020     

 

                    CT Abdomen/Pelvis w/wo contrast 71186 Date: 2020     

 

                    History of  Section                     Completed 



 

                    History of Tubal Ligation                     Completed 









Immunization





                    Name                Dates               Details

 

                                        Fluzone Quadrivalent 0.5 ML Intramuscula

r Suspension #1

Lot #: [id number]            on: 3-Sep-2014             

 

                                        Tdap (Adacel) #1

Lot #: [id number]            on: 3-Sep-2014             

 

                                        Tdap (Boostrix) #1

Lot #: [id number]            on: 2014            







Family History





                    Name                Dates               Details

 

                                        Family history of essential hypertension

 (V17.49, Z82.49) 

                                                    Status: Active

 

                                        Family history of thyroid disease (V18.1

9, Z83.49) 

                                                    Status: Active

 

                                        Family history of type 2 diabetes [facility]

us (V18.0, Z83.3) 

                                                    Status: Active







Social History





                    Name                Dates               Details

 

                                        -

                                                    Status: 







                    Name                Dates               Details

 

                    Former smoker                            

 

                    Former smoker                            







Vital Signs





                Date            Test            Result          Details

 

                                                                 

 

                                        [id number]

                    BP Systolic         135 mm[Hg]          Status: Comments: Lo

cation: RUE; Position: Sitting



 

                    BP Diastolic        86 mm[Hg]           Status: Comments: Lo

cation: RUE; Position: Sitting



 

                    Height              70 in               Status: 



 

                    Weight              338 lb              Status: 



 

                    Body Mass Index Calculated 48.5 kg/m2          Status: 



 

                    Body Surface Area Calculated 2.61 m2             Status: 



 

                    Temperature         98.5 f              Status: Comments: Me

thod: Oral



 

                    Heart Rate          88 /min             Status: 









Results





                Date            Description     Value           Details

 

                    Results not documented                      

 

                                                                 







Plan of Care





                    Name                Dates               Details

 

                                        Planned Observations 

 

                    Planned Goals not documented                      

 

                                        Planned Encounters 

 

                                        Appointment; ADULT, HEMOPHILIA

                                        On: 3-Jeremy-2020 9:00









Instructions





                    Name                Dates               Details

 

                                        Instructions not documented

                                                     







Encounters





                                        Appointment; PABLO MOORE M.D. 

Encounter Diagnosis: Problem not documented

                                        On: 2018 13:30



 

                                        Appointment; [address] 

Encounter Diagnosis: Problem not documented

                                        On: 2018 14:00



 

                                        Appointment; PABLO MOORE M.D. 

Encounter Diagnosis: Problem not documented

                                        On: 2019 8:00



 

                                        Appointment; SALVADOR LOPEZ M.D . 

Encounter Diagnosis: Problem not documented

                                        On: 2019 9:00



 

                                        Appointment; ADULT, HEMOPHILIA 

Encounter Diagnosis: Problem not documented

                                        On: 18-Sep-2019 9:00



 

                                        Appointment; ADULT, HEMOPHILIA 

Encounter Diagnosis: Problem not documented

                                        On: 4-Dec-2019 9:00



 

                                        Appointment; JONG LUND M.D. 

Encounter Diagnosis: Problem not documented

                                        On: 2020 14:15



 

                                        Appointment; ADULT, HEMOPHILIA 

Encounter Diagnosis: Problem not documented

                                        On: 3-Richi-2020 9:00



 

                                        Appointment; ADULT, HEMOPHILIA 

Encounter Diagnosis: Problem not documented

                                        On: 2020 12:00



 

                                        Appointment; JONG LUND M.D. 

Encounter Diagnosis: Problem not documented

                                        On: 2020 13:30

## 2020-05-24 NOTE — XMS REPORT
Summary of Care: 14 - 14

                             Created on: 2058



WANDA ARITA

External Reference #: [id number]

: 1990

Sex: Female



Demographics





                          Address                   [address] [place] [address]-

 

                          Home Phone                [phone]

 

                          Preferred Language        English

 

                          Marital Status            Single

 

                          Zoroastrian Affiliation     Yarsanism

 

                          Race                      Other

 

                          Ethnic Group              /Latin





Author





                          Organization              Unknown

 

                          Address                   Unknown

 

                          Phone                     Unavailable







Encounter





[id number](FIN) [id number] Date(s): 14 - 14

[address] (919)
[phone]

Discharge Disposition: Not Seen

Physician Attending: Chuck Singh MD





Reason for Visit





ABDOMINAL PAIN



Vital Signs





 



                           Most recent to            1



                                         oldest [Reference 



                                         Range]: 

 

 



                           Height                    175.26 cm



                                         (14 7:16 PM)

 

 



                           Temperature Oral          97.8 DegF



                           [96.4-99.1 DegF]          (14 7:16 PM)

 

 



                           Systolic Blood            142 mmHg



                           Pressure [          *HI*



                           mmHg]                     (14 7:16 PM)

 

 



                           Diastolic Blood           94 mmHg



                           Pressure [60-90           *HI*



                           mmHg]                     (14 7:16 PM)

 

 



                           Respiratory Rate          18 BRMIN



                           [14-20 BRMIN]             (14 7:16 PM)

 

 



                           Peripheral Pulse          85 bpm



                           Rate [ bpm]         (14 7:16 PM)

 

 



                           Weight                    136.364 kg



                                         (14 7:16 PM)

 

 



                           Body Mass Index           44.4 m2



                                         (14 7:16 PM)







Problem List





    



              Condition    Effective Dates  Status       Health Status  Informan

t

 

    



                           Anemia(Confirmed)         Resolved  







Allergies, Adverse Reactions, Alerts





   



                 Substance       Reaction        Severity        Status

 

   



                           codeine                   Active

 

   



                           Zofran                    Active







Medications





No data available for this section



Medications Administered During Your Visit





No data available for this section



Immunizations





No data available for this section



Social History





 



                           Social History Type       Response

 

 



                           Smoking Status            Never smoker, Type: Cigaret

kel, Exposure to Tobacco Smoke None,

Cigarette



                                         Smoking Last 365 Days No, Reg Smoking C

essation Counseling No

## 2020-05-24 NOTE — XMS REPORT
Summary of Care

                             Created on: 2020



WANDA ARITA

External Reference #: [id number]

: 1990

Sex: Female



Demographics





                          Address                   [address] [place]  [address]

 

                          Preferred Language        English

 

                          Marital Status            Unknown

 

                          [employer] Affiliation     Unknown

 

                          Race                      White

 

                          Ethnic Group              Non-





Author





                          Author                    [facility]

 

                          Organization              [facility]

 

                          Address                   [address] [place]  [address]



 

                          Phone                     Unavailable







Care Team Providers





                    Care Team Member Name Role                Phone

 

                    ASHELY CONROY,  JONG Unavailable         Unavailable

 

                    FEROZ ESQUIVEL Unavailable         Unavailable

 

                    KAMALJIT BRANDT,  ALEJANDRO ARROYO  Unavailable         Unavailable

 

                    Jef Ortega MD Unavailable         Unavailable

 

                    ASHELY BRANDT [facility],  JONG Unavailable         Unavailable

 

                    TERESA BRANDT,  PABLO WALKER Unavailable         Unavailable

 

                    KAILEY BRANDT,  JAMEY ARROYO Unavailable         Unavailable

 

                    Mark BRANDT,  Salvador Unavailable         Unavailable

 

                          Unavailable               Unavailable







Functional Status





                    Name                Dates               Details

 

                                        Functional status health issues are not 

documented

                                                    Status: 







                    Name                Dates               Details

 

                                        Cognitive status health issues are not d

ocumented

                                                    Status: 







Problems





                    Name                Dates               Details

 

                                        Need for rubella vaccination (V04.3, Z23

) 

                                                    Status: Active

 

                                        Constipation (564.00, K59.00) 

                                                    Status: Active

 

                                        Morbid obesity (278.01, E66.01) 

                                                    Status: Active

 

                                        General counseling and advice for contra

ceptive management (V25.09, Z30.09) 

                                                    Status: Active

 

                                        Follow up (V67.9, Z09) 

                                                    Status: Active

 

                                        History of  section, unknown sca

r (V45.89, Z98.891) 

                                                    Status: Active

 

                                        Bacterial vaginosis (616.10, N76.0) 

                                                    Status: Active

 

                                        Former smoker (V15.82, Z87.891) 

                                                    Status: Active

 

                                        Type 2B von Willebrand's disease (286.4,

 D68.0) 

                                                    Status: Active

 

                                        Menorrhagia with irregular cycle (626.2,

 N92.1) 

                                                    Status: Active

 

                                        Epistaxis (784.7, R04.0) 

                                                    Status: Active

 

                                        Acid reflux (530.81, K21.9) 

                                                    Status: Active

 

                                        Morbid obesity with body mass index (BMI

) of 45.0 to 49.9 in adult (278.01, 

E66.01) 

                                                    Status: Active

 

                                        Elevated blood pressure reading without 

diagnosis of hypertension (796.2, R03.0)



                                                    Status: Active

 

                                        Pelvic pain in female (625.9, R10.2) 

                                                    Status: Active

 

                                        Breast mass in female (611.72, N63.0) 

                                                    Status: Active

 

                                        Encounter for routine gynecological exam

ination with Papanicolaou smear of 

cervix (V72.31, Z01.419) 

                                                    Status: Active

 

                                        Encounter for blood test for routine gen

eral physical examination (V72.62, 

Z00.00) 

                                                    Status: Active

 

                                        Encounter for screening examination for 

sexually transmitted disease (V74.5, 

Z11.3) 

                                                    Status: Active







Medications





                    Name                Dates               Details

 

                                        Tranexamic Acid 650 MG Oral Tablet

Take 2 tablets po every 8 hours starting on day 1 of menses for a total of 5 
days



 

                                         Quantity: 30









FEROZ ESQUIVEL * 



                                         Start : 18-Sep-2019





Active

metroNIDAZOLE 500 MG Oral Tablet

TAKE ONE TABLET BY MOUTH TWICE A DAY . DO NOT DRINK ALCOHOL

* 



                           Quantity: 14              Refills: 0









JONG LUND M.D. * 



                                         Start : 5-Mar-2020





Active





Allergies and Adverse Reactions





                    Name                Dates               Details

 

                    Codeine Derivatives (Allergy)                     Status: Ac

tive



 

                    Zofran TABS (Allergy)                     Reaction: Rash, It

anival

Status: Active









Past Medical History





                    Name                Dates               Details

 

                                        History of Anemia complicating pregnancy

 (648.20, O99.019) 

                                                    Status: Resolved

 

                                        History of High-risk pregnancy supervisi

on (V23.9, O09.90) 

                                                    Status: Resolved

 

                                        History of hyperemesis gravidarum (V13.2

9, Z87.59) 

                                                    Status: Resolved

 

                                        History of Hypertension complicating pre

gnancy, unspecified trimester (642.90, 

O16.9) 

                                                    Status: Resolved

 

                                        History of MTHFR mutation (V12.3, Z15.89

) 

                                                    Status: Resolved

 

                                        History of nausea (V12.79, Z87.898) 

                                                    Status: Resolved

 

                                        History of Nexplanon insertion (V25.5, Z

30.017) 

                                                    Status: Resolved







Procedures





                    Procedure           Dates               Details

 

                    History of  Section                     Completed 



 

                    History of Tubal Ligation                     Completed 









Immunization





                    Name                Dates               Details

 

                                        Tdap (Adacel) #1

Lot #: [id number]            on: 3-Sep-2014             

 

                                        Fluzone Quadrivalent 0.5 ML Intramuscula

r Suspension #1

Lot #: [id number]            on: 3-Sep-2014             

 

                                        Tdap (Boostrix) #1

Lot #: [id number]            on: 2014            







Family History





                    Name                Dates               Details

 

                                        Family history of essential hypertension

 (V17.49, Z82.49) 

                                                    Status: Active

 

                                        Family history of thyroid disease (V18.1

9, Z83.49) 

                                                    Status: Active

 

                                        Family history of type 2 diabetes [facility] (V18.0, Z83.3) 

                                                    Status: Active







Social History





                    Name                Dates               Details

 

                                        -

                                                    Status: 







                    Name                Dates               Details

 

                    Ex-smoker (finding)                      

 

                    Ex-smoker (finding)                      







Vital Signs





                Date            Test            Result          Details

 

                                                                 

 

                                        [id number]

                    Systolic blood pressure 135 mm[Hg]          Status: Comments

: Location: RUE; Position: 

Sitting



 

                    Diastolic blood pressure 86 mm[Hg]           Status: Comment

s: Location: RUE; Position: 

Sitting



 

                    Body height         70 in               Status: 



 

                    Weight              338 lb              Status: 



 

                    Body mass index (BMI) [Ratio] 48.5 kg/m2          Status: 



 

                    Body surface area Derived from formula 2.61 m2             S

tatus: 



 

                    Body temperature    98.5 f              Status: Comments: Me

thod: Oral



 

                    Heart Rate          88 /min             Status: 









Results





                Date            Description     Value           Details

 

                    Results not documented                      

 

                                                                 







Plan of Care





                    Name                Dates               Details

 

                                        Planned Observations 

 

                    Planned Goals not documented                      

 

                                        Planned Encounters 

 

                                        Appointment; ADULT, HEMOPHILIA

                                        On: 3-Jeremy-2020 9:00









Instructions





                    Name                Dates               Details

 

                                        Instructions not documented

                                                     







Encounters





                                        Appointment; PABLO MOORE M.D. 

Encounter Diagnosis: Problem not documented

                                        On: 2018 13:30



 

                                        Appointment; [address] 

Encounter Diagnosis: Problem not documented

                                        On: 2018 14:00



 

                                        Appointment; PABLO MOORE M.D. 

Encounter Diagnosis: Problem not documented

                                        On: 2019 8:00



 

                                        Appointment; SALVADOR LOPEZ M.D . 

Encounter Diagnosis: Problem not documented

                                        On: 2019 9:00



 

                                        Appointment; ADULT, HEMOPHILIA 

Encounter Diagnosis: Problem not documented

                                        On: 18-Sep-2019 9:00



 

                                        Appointment; ADULT, HEMOPHILIA 

Encounter Diagnosis: Problem not documented

                                        On: 4-Dec-2019 9:00



 

                                        Appointment; JONG LUND M.D. 

Encounter Diagnosis: Problem not documented

                                        On: 2020 14:15



 

                                        Appointment; ADULT, HEMOPHILIA 

Encounter Diagnosis: Problem not documented

                                        On: 3-Richi-2020 9:00



 

                                        Appointment; ADULT, HEMOPHILIA 

Encounter Diagnosis: Problem not documented

                                        On: 2020 12:00



 

                                        Appointment; JONG LUND M.D. 

Encounter Diagnosis: Problem not documented

                                        On: 2020 13:30



 

                                        Appointment; ADULT, HEMOPHILIA 

Encounter Diagnosis: Problem not documented

                                        On: 12-Mar-2020 8:00

## 2020-05-24 NOTE — XMS REPORT
Summary of Care

                             Created on: 2020



WANDA ARITA

External Reference #: [id number]

: 1990

Sex: Female



Demographics





                          Address                   [address] [place]  [address]

 

                          Preferred Language        English

 

                          Marital Status            Unknown

 

                          Religion Affiliation     Unknown

 

                          Race                      White

 

                          Ethnic Group              Non-





Author





                          Author                    [facility]

 

                          Organization              [facility]

 

                          Address                   [address] [place]  [address]



 

                          Phone                     Unavailable







Care Team Providers





                    Care Team Member Name Role                Phone

 

                    SONIA ANTOINE,  FEROZ Unavailable         Unavailable

 

                    KAMALJIT BRANDT,  ALEJANDRO ARROYO  Unavailable         Unavailable

 

                    Jordan BRANDT,  Jef Unavailable         Unavailable

 

                    ASHELY BRANDT [facility],  JONG Unavailable         Unavailable

 

                    TERESA BRANDT,  PABLO WALKER Unavailable         Unavailable

 

                    KAILEY BRANDT,  JAMEY ARROYO Unavailable         Unavailable

 

                    Mark BRANDT,  Salvador Unavailable         Unavailable

 

                          Unavailable               Unavailable







Functional Status





                    Name                Dates               Details

 

                                        Functional status health issues are not 

documented

                                                    Status: 







                    Name                Dates               Details

 

                                        Cognitive status health issues are not d

ocumented

                                                    Status: 







Problems





                    Name                Dates               Details

 

                                        Need for rubella vaccination (V04.3, Z23

) 

                                                    Status: Active

 

                                        Constipation (564.00, K59.00) 

                                                    Status: Active

 

                                        Morbid obesity (278.01, E66.01) 

                                                    Status: Active

 

                                        General counseling and advice for contra

ceptive management (V25.09, Z30.09) 

                                                    Status: Active

 

                                        Elevated blood pressure reading without 

diagnosis of hypertension (796.2, R03.0)



                                                    Status: Active

 

                                        Morbid obesity with body mass index (BMI

) of 45.0 to 49.9 in adult (278.01, 

E66.01) 

                                                    Status: Active

 

                                        Acid reflux (530.81, K21.9) 

                                                    Status: Active

 

                                        Epistaxis (784.7, R04.0) 

                                                    Status: Active

 

                                        Menorrhagia with irregular cycle (626.2,

 N92.1) 

                                                    Status: Active

 

                                        Type 2B von Willebrand's disease (286.4,

 D68.0) 

                                                    Status: Active

 

                                        Former smoker (V15.82, Z87.891) 

                                                    Status: Active

 

                                        Encounter for screening examination for 

sexually transmitted disease (V74.5, 

Z11.3) 

                                                    Status: Active

 

                                        Encounter for blood test for routine gen

eral physical examination (V72.62, 

Z00.00) 

                                                    Status: Active

 

                                        Encounter for routine gynecological exam

ination with Papanicolaou smear of 

cervix (V72.31, Z01.419) 

                                                    Status: Active

 

                                        Breast mass in female (611.72, N63.0) 

                                                    Status: Active

 

                                        Pelvic pain in female (625.9, R10.2) 

                                                    Status: Active







Medications





                    Name                Dates               Details

 

                                        Tranexamic Acid 650 MG Oral Tablet

Take 2 tablets po every 8 hours starting on day 1 of menses for a total of 5 
days



 

                                         Quantity: 30









FEROZ SCOTT N.P. * 



                                         Start : 18-Sep-2019





Active

Iron 100 Plus 100-250-0.025-1 MG Oral Tablet

* 



                                         Refills: 0







* 



                                         Start : 2020





Active





Allergies and Adverse Reactions





                    Name                Dates               Details

 

                    Codeine Derivatives (Allergy)                     Status: Ac

tive



 

                    Zofran TABS (Allergy)                     Reaction: Rash, It

anival

Status: Active









Past Medical History





                    Name                Dates               Details

 

                                        History of Anemia complicating pregnancy

 (648.20, O99.019) 

                                                    Status: Resolved

 

                                        History of High-risk pregnancy supervisi

on (V23.9, O09.90) 

                                                    Status: Resolved

 

                                        History of hyperemesis gravidarum (V13.2

9, Z87.59) 

                                                    Status: Resolved

 

                                        History of Hypertension complicating pre

gnancy, unspecified trimester (642.90, 

O16.9) 

                                                    Status: Resolved

 

                                        History of MTHFR mutation (V12.3, Z15.89

) 

                                                    Status: Resolved

 

                                        History of nausea (V12.79, Z87.898) 

                                                    Status: Resolved

 

                                        History of Nexplanon insertion (V25.5, Z

30.017) 

                                                    Status: Resolved







Procedures





                    Procedure           Dates               Details

 

                    [LH] TSH+Free T4    Date: 2020     

 

                    . [facility] - Affirm VPIII (BV Panel) Date: 2020     

 

                    . [facility] - PAP      Date: 2020     

 

                    [QH] HIV AB, HIV 1/2, EIA, WITH REFLEXES Date: 2020   

  

 

                    US Pelvis with Pelvis Transvaginal 57991 Date: 2020   

  

 

                    MA Digital Mammo DX Charlie  Date: 2020     

 

                    US Breast  Unilat 40715 Date: 2020     

 

                    CT Abdomen/Pelvis w/wo contrast 91878 Date: 2020     

 

                    History of  Section                     Completed 



 

                    History of Tubal Ligation                     Completed 









Immunization





                    Name                Dates               Details

 

                                        Tdap (Adacel) #1

Lot #: [id number]            on: 3-Sep-2014             

 

                                        Fluzone Quadrivalent 0.5 ML Intramuscula

r Suspension #1

Lot #: [id number]            on: 3-Sep-2014             

 

                                        Tdap (Boostrix) #1

Lot #: [id number]            on: 2014            







Family History





                    Name                Dates               Details

 

                                        Family history of essential hypertension

 (V17.49, Z82.49) 

                                                    Status: Active

 

                                        Family history of thyroid disease (V18.1

9, Z83.49) 

                                                    Status: Active

 

                                        Family history of type 2 diabetes [facility] (V18.0, Z83.3) 

                                                    Status: Active







Social History





                    Name                Dates               Details

 

                                        -

                                                    Status: 







                    Name                Dates               Details

 

                    Former smoker                            

 

                    Former smoker                            







Vital Signs





                Date            Test            Result          Details

 

                                                                 

 

                    No Known Vitals to report                      







Results





                Date            Description     Value           Details

 

                    :05     [[facility]] LIPID PANEL    

 

                                CHOLESTEROL, TOTAL 184  mg/dl (Normal) Range: <2

00





 

                                HDL CHOLESTEROL 47  mg/dl (Below low threshold) 

Range: >50





 

                                TRIGLYCERIDES   155  mg/dl (Above high threshold

) Range: <150





 

                                LDL-CHOLESTEROL 110  {MG/DL__CAL} (Above high th

reshold) Comments: Reference 

range: <100 Desirable range <100 mg/dL for primary prevention;  <70 mg/dL for 
patients with CHD or diabetic patients with > or = 2 CHD risk factors. LDL-C is 
now calculated using the Derek-Chencho calculation, which is a validated novel 
method providing better accuracy than the Friedewald equation in the estimation 
of LDL-C. Derek SS et al. DAVE. 2013;310(19): 6376-6534 (http
://education.[employer].com/faq/JRO755)



 

                                CHOL/HDLC RATIO 3.9  {CALC} (Normal) Range: <5.0





 

                                NON HDL CHOLESTEROL 137  {MG/DL__CAL} (Above hig

h threshold) Range: <130

Comments: For patients with diabetes plus 1 major ASCVD risk factor, treating to
 a non-HDL-C goal of <100 mg/dL (LDL-C of <70 mg/dL) is considered a therapeutic
 option.



 

                          :05           [Q] HIV-1/2 Antigen and Anti

bodies, Fourth Generation, with 

Reflexes                                 

 

                                HIV AG/AB, 4TH GEN NON-REACTIVE   (Normal) Range

: NON-REACTIVE

Comments: HIV-1 antigen and HIV-1/HIV-2 antibodies were notdetected. There is no
 laboratory evidence of HIVinfection. PLEASE NOTE: This information has been 
disclosed toyou from records whose confidentiality may beprotected by state law.
  If your state requires suchprotection, then the state law prohibits you 
frommaking any further disclosure of the informationwithout the specific written
 consent of the personto whom it pertains, or as otherwise permitted by law.A 
general authorization for the release of medical orother information is NOT 
sufficient for this purpose.  For additional information please refer t
ohttp://education.[employer]/faq/APL957(This link is being provided 
for informational/educational purposes only.)  The performance of this assay has
 not been clinicallyvalidated in patients less than 2 years old.



 

                    :05     [[facility]] CMP W/EGFR     

 

                                GLUCOSE         84  mg/dl (Normal) Range: 65-99

Comments: Fasting reference interval



 

                                UREA NITROGEN (BUN) 12  mg/dl (Normal) Range: 7-

25





 

                                CREATININE      0.68  mg/dl (Normal) Range: 0.50

-1.10





 

                                eGFR NON- 117  {ML/MIN/1.7} (Nor

mal) Range: > OR = 60





 

                                eGFR  135  {ML/MIN/1.7} (Normal)

 Range: > OR = 60





 

                                BUN/CREATININE RATIO NOT APPLICABLE  {CALC} Rang

e: 6-22





 

                                SODIUM          141  mmol/L (Normal) Range: 135-

146





 

                                POTASSIUM       3.9  mmol/L (Normal) Range: 3.5-

5.3





 

                                CHLORIDE        104  mmol/L (Normal) Range: 98-1

10





 

                                CARBON DIOXIDE  25  mmol/L (Normal) Range: 20-32





 

                                CALCIUM         9.3  mg/dl (Normal) Range: 8.6-1

0.2





 

                                PROTEIN, TOTAL  7.6  g/dl (Normal) Range: 6.1-8.

1





 

                                ALBUMIN         3.9  g/dl (Normal) Range: 3.6-5.

1





 

                                GLOBULIN        3.7  {G/DL__CALC} (Normal) Range

: 1.9-3.7





 

                                ALBUMIN/GLOBULIN RATIO 1.1  {CALC} (Normal) Rang

e: 1.0-2.5





 

                                BILIRUBIN, TOTAL 0.7  mg/dl (Normal) Range: 0.2-

1.2





 

                                ALKALINE PHSPHATASE 77  u/l (Normal) Range: 33-1

15





 

                                AST             54  u/l (Above high threshold) R

michaela: 10-30





 

                                ALT             47  u/l (Above high threshold) R

michaela: 6-29





 

                    :05     [[facility]] CBC (INCLUDES DIFF/PLT)   

 

                                WHITE BLOOD CELL COUNT 8.1  {Thousand/u} (Normal

) Range: 3.8-10.8





 

                                RED BLOOD CELL COUNT 4.66  {Million/uL} (Normal)

 Range: 3.80-5.10





 

                                HEMAGLOBIN      11.9  g/dl (Normal) Range: 11.7-

15.5





 

                                HEMATOCRIT      37.7  % (Normal) Range: 35.0-45.

0





 

                                MCV             80.9  fL (Normal) Range: 80.0-10

0.0





 

                                MCH             25.5  pg (Below low threshold) R

michaela: 27.0-33.0





 

                                MCHC            31.6  g/dl (Below low threshold)

 Range: 32.0-36.0





 

                                RDW             14.5  % (Normal) Range: 11.0-15.

0





 

                                PLATELET COUNT  283  {Thousand/u} (Normal) Range

: 140-400





 

                                MPV             11.4  fL (Normal) Range: 7.5-12.

5





 

                                ABSOLUTE NEUTROPHILS 4957  {cells/uL} (Normal) R

michaela: 9383-5995





 

                                ABSOLUTE LYMPHOCYTES 2252  {cells/uL} (Normal) R

michaela: 850-3900





 

                                ABSOLUTE MONOCYTES 510  {cells/uL} (Normal) Rang

e: 200-950





 

                                ABSOLUTE EOSINOPHILS 332  {cells/uL} (Normal) Ra

nge: 





 

                                ABSOLUTE BASOPHILS 49  {cells/uL} (Normal) Range

: 0-200





 

                                        NEUTROPHILS         61.2  % (Normal)  

 

                                        LYMPHOCYTES         27.8  % (Normal)  

 

                                        MONOCYTES           6.3  % (Normal)  

 

                                        EOSINOPHILS         4.1  % (Normal)  

 

                                        BASOPHILS           0.6  % (Normal)  

 

                    :05     [[facility]] HEPATITIS B SURFACE ANTIGEN W/REFL 

CONFIRM   

 

                                HEPATITIS B SURFACE ANTIGEN NON-REACTIVE   (Norm

al) Range: NON-REACTIVE





 

                    :05     [[facility]] HEPATITIS C ANTIBODY   

 

                                HEPATITIS C ANTIBODY NON-REACTIVE   (Normal) Ran

ge: NON-REACTIVE





 

                                SIGNAL TO CUT-OFF 0.01   (Normal) Range: <1.00

Comments: HCV antibody was non-reactive. There is no laboratory evidence of HCV 
infection. In most cases, no further action is required. However,if recent HCV 
exposure is suspected, a test for HCV RNA(test code 31106) is suggested. For 
additional information please refer 
tohttp://[employer]/faq/EGN50b6(This link is being provided 
for informational/educational purposes only.)



 

                    :05     [[facility]] T4, FREE       

 

                                T4, FREE        1.1  ng/dl (Normal) Range: 0.8-1

.8





 

                    :05     [[facility]] TSH, 3RD GENERATION   

 

                                TSH             1.71  {MIU/L} (Normal) Comments:

 Reference Range                     > or 

= 20 Years  0.40-4.50                          Pregnancy Ranges          First 
trimester    0.26-2.66          Second trimester   0.55-2.73          Third 
trimester    0.43-2.91



 

                    :05     [[facility]] HEMOGLOBIN A1c   

 

                                HEMOGLOBIN A1c  5.9  {%_of_total} (Above high th

reshold) Range: <5.7

Comments: For someone without known diabetes, a hemoglobin A1c value between 
5.7% and 6.4% is consistent withprediabetes and should be confirmed with a 
follow-up test. For someone with known diabetes, a value &lt;7%indicates that 
their diabetes is well controlled. G2ncyajxdr should be individualized based on 
duration ofdiabetes, age, comorbid conditions, and otherconsiderations. This 
assay result is consistent with an increased riskof diabetes. Currently, no 
consensus exists regarding use ofhemoglobin A1c for diagnosis of diabetes for 
children.



 

                    :05     [[facility]] RPR            

 

                                RPR (MONITOR) W/REFL TITER (REFL) NON-REACTIVE  

 (Normal) Range: NON-REACTIVE











Plan of Care





                    Name                Dates               Details

 

                                        Planned Observations 

 

                    Planned Goals not documented                      

 

                                        Planned Encounters 

 

                                        Appointment; JONG LUND M.D.

                                        On: 2020 15:00



 

                                        Appointment; ADULT, HEMOPHILIA

                                        On: 3-Jeremy-2020 9:00









Instructions





                    Name                Dates               Details

 

                                        Instructions not documented

                                                     







Encounters





                                        Appointment; PABLO MOORE M.D. 

Encounter Diagnosis: Problem not documented

                                        On: 2018 13:30



 

                                        Appointment; [address] 

Encounter Diagnosis: Problem not documented

                                        On: 2018 14:00



 

                                        Appointment; PABLO MOORE M.D. 

Encounter Diagnosis: Problem not documented

                                        On: 2019 8:00



 

                                        Appointment; SALVADOR LOPEZ M.D . 

Encounter Diagnosis: Problem not documented

                                        On: 2019 9:00



 

                                        Appointment; ADULT, HEMOPHILIA 

Encounter Diagnosis: Problem not documented

                                        On: 18-Sep-2019 9:00



 

                                        Appointment; ADULT, HEMOPHILIA 

Encounter Diagnosis: Problem not documented

                                        On: 4-Dec-2019 9:00



 

                                        Appointment; JONG LUND M.D. 

Encounter Diagnosis: Problem not documented

                                        On: 2020 14:15



 

                                        Appointment; ADULT, HEMOPHILIA 

Encounter Diagnosis: Problem not documented

                                        On: 3-Richi-2020 9:00



 

                                        Appointment; ADULT, HEMOPHILIA 

Encounter Diagnosis: Problem not documented

                                        On: 2020 12:00

## 2020-05-24 NOTE — XMS REPORT
Summary of Care

                             Created on: 2020



WANDA ARITA

External Reference #: [id number]

: 1990

Sex: Female



Demographics





                          Address                   [address] [place] [address]

 

                          Preferred Language        English

 

                          Marital Status            Unknown

 

                          Roman Catholic Affiliation     Unknown

 

                          Race                      White

 

                          Ethnic Group              Non-





Author





                          Author                    WANDA Chery M.A.

 

                          Organization              Unknown

 

                          Address                   Unknown

 

                          Phone                     Unavailable







Care Team Providers





                    Care Team Member Name Role                Phone

 

                    Zenon Chery M.A. Unavailable         Unavailable

 

                    SONIA ANTOINE,  FEROZ Unavailable         Unavailable

 

                    KAMALJIT BRANDT,  ALEJANDRO ARROYO  Unavailable         Unavailable

 

                    Jordan BRANDT,  Jef Unavailable         Unavailable

 

                    ASHELY BRANDT [place],  JONG Unavailable         Unavailable

 

                    TERESA BRANDT,  PABLO WALKER Unavailable         Unavailable

 

                    KAILEY BRANDT,  JAMEY ARROYO Unavailable         Unavailable

 

                    Mark BRANDT,  Salvador Unavailable         Unavailable

 

                          Unavailable               Unavailable







Functional Status





                    Name                Dates               Details

 

                                        Functional status health issues are not 

documented

                                                    Status: 







                    Name                Dates               Details

 

                                        Cognitive status health issues are not d

ocumented

                                                    Status: 







Problems





                    Name                Dates               Details

 

                                        Need for rubella vaccination (V04.3, Z23

) 

                                                    Status: Active

 

                                        Constipation (564.00, K59.00) 

                                                    Status: Active

 

                                        Morbid obesity (278.01, E66.01) 

                                                    Status: Active

 

                                        General counseling and advice for contra

ceptive management (V25.09, Z30.09) 

                                                    Status: Active

 

                                        Elevated blood pressure reading without 

diagnosis of hypertension (796.2, R03.0)



                                                    Status: Active

 

                                        Morbid obesity with body mass index (BMI

) of 45.0 to 49.9 in adult (278.01, 

E66.01) 

                                                    Status: Active

 

                                        Acid reflux (530.81, K21.9) 

                                                    Status: Active

 

                                        Epistaxis (784.7, R04.0) 

                                                    Status: Active

 

                                        Menorrhagia with irregular cycle (626.2,

 N92.1) 

                                                    Status: Active

 

                                        Type 2B von Willebrand's disease (286.4,

 D68.0) 

                                                    Status: Active

 

                                        Former smoker (V15.82, Z87.891) 

                                                    Status: Active

 

                                        Encounter for screening examination for 

sexually transmitted disease (V74.5, 

Z11.3) 

                                                    Status: Active

 

                                        Encounter for blood test for routine gen

eral physical examination (V72.62, 

Z00.00) 

                                                    Status: Active

 

                                        Encounter for routine gynecological exam

ination with Papanicolaou smear of 

cervix (V72.31, Z01.419) 

                                                    Status: Active

 

                                        Breast mass in female (611.72, N63.0) 

                                                    Status: Active

 

                                        Pelvic pain in female (625.9, R10.2) 

                                                    Status: Active

 

                                        Follow up (V67.9, Z09) 

                                                    Status: Active

 

                                        History of  section, unknown sca

r (V45.89, Z98.891) 

                                                    Status: Active







Medications





                    Name                Dates               Details

 

                                        Tranexamic Acid 650 MG Oral Tablet

Take 2 tablets po every 8 hours starting on day 1 of menses for a total of 5 
days



 

                                         Quantity: 30









SONIA KHAN.FEROZ CLARK * 



                                         Start : 18-Sep-2019





Active





Allergies and Adverse Reactions





                    Name                Dates               Details

 

                    Codeine Derivatives (Allergy)                     Status: Ac

tive



 

                    Zofran TABS (Allergy)                     Reaction: Rash, It

anival

Status: Active









Past Medical History





                    Name                Dates               Details

 

                                        History of Anemia complicating pregnancy

 (648.20, O99.019) 

                                                    Status: Resolved

 

                                        History of High-risk pregnancy supervisi

on (V23.9, O09.90) 

                                                    Status: Resolved

 

                                        History of hyperemesis gravidarum (V13.2

9, Z87.59) 

                                                    Status: Resolved

 

                                        History of Hypertension complicating pre

gnancy, unspecified trimester (642.90, 

O16.9) 

                                                    Status: Resolved

 

                                        History of MTHFR mutation (V12.3, Z15.89

) 

                                                    Status: Resolved

 

                                        History of nausea (V12.79, Z87.898) 

                                                    Status: Resolved

 

                                        History of Nexplanon insertion (V25.5, Z

30.017) 

                                                    Status: Resolved







Procedures





                    Procedure           Dates               Details

 

                    [LH] TSH+Free T4    Date: 2020     

 

                    . [facility] - Affirm VPIII (BV Panel) Date: 2020     

 

                    . [facility] - PAP      Date: 2020     

 

                    [QH] HIV AB, HIV 1/2, EIA, WITH REFLEXES Date: 2020   

  

 

                    US Pelvis with Pelvis Transvaginal 82131 Date: 2020   

  

 

                    MA Digital Mammo DX Charlie  Date: 2020     

 

                    US Breast  Unilat 86568 Date: 2020     

 

                    CT Abdomen/Pelvis w/wo contrast 31212 Date: 2020     

 

                    History of  Section                     Completed 



 

                    History of Tubal Ligation                     Completed 









Immunization





                    Name                Dates               Details

 

                                        Fluzone Quadrivalent 0.5 ML Intramuscula

r Suspension #1

Lot #: [id number]            on: 3-Sep-2014             

 

                                        Tdap (Adacel) #1

Lot #: [id number]            on: 3-Sep-2014             

 

                                        Tdap (Boostrix) #1

Lot #: [id number]            on: 2014            







Family History





                    Name                Dates               Details

 

                                        Family history of essential hypertension

 (V17.49, Z82.49) 

                                                    Status: Active

 

                                        Family history of thyroid disease (V18.1

9, Z83.49) 

                                                    Status: Active

 

                                        Family history of type 2 diabetes [facility] (V18.0, Z83.3) 

                                                    Status: Active







Social History





                    Name                Dates               Details

 

                                        -

                                                    Status: 







                    Name                Dates               Details

 

                    Former smoker                            

 

                    Former smoker                            







Vital Signs





                Date            Test            Result          Details

 

                                                                 

 

                                        29-Ols-154323:19

                    BP Systolic         135 mm[Hg]          Status: Comments: Lo

cation: RUE; Position: Sitting



 

                    BP Diastolic        86 mm[Hg]           Status: Comments: Lo

cation: RUE; Position: Sitting



 

                    Height              70 in               Status: 



 

                    Weight              338 lb              Status: 



 

                    Body Mass Index Calculated 48.5 kg/m2          Status: 



 

                    Body Surface Area Calculated 2.61 m2             Status: 



 

                    Temperature         98.5 f              Status: Comments: Me

thod: Oral



 

                    Heart Rate          88 /min             Status: 









Results





                Date            Description     Value           Details

 

                    Results not documented                      

 

                                                                 







Plan of Care





                    Name                Dates               Details

 

                                        Planned Observations 

 

                    Planned Goals not documented                      

 

                                        Planned Encounters 

 

                                        Appointment; ADULT, HEMOPHILIA

                                        On: 3-Jeremy-2020 9:00









Instructions





                    Name                Dates               Details

 

                                        Instructions not documented

                                                     







Encounters





                                        Appointment; PABLO MOORE M.D. 

Encounter Diagnosis: Problem not documented

                                        On: 2018 13:30



 

                                        Appointment; [address] 

Encounter Diagnosis: Problem not documented

                                        On: 2018 14:00



 

                                        Appointment; PABLO MOORE M.D. 

Encounter Diagnosis: Problem not documented

                                        On: 2019 8:00



 

                                        Appointment; SALVADOR LOPEZ M.D . 

Encounter Diagnosis: Problem not documented

                                        On: 2019 9:00



 

                                        Appointment; ADULT, HEMOPHILIA 

Encounter Diagnosis: Problem not documented

                                        On: 18-Sep-2019 9:00



 

                                        Appointment; ADULT, HEMOPHILIA 

Encounter Diagnosis: Problem not documented

                                        On: 4-Dec-2019 9:00



 

                                        Appointment; JONG LUND M.D. 

Encounter Diagnosis: Problem not documented

                                        On: 2020 14:15



 

                                        Appointment; ADULT, HEMOPHILIA 

Encounter Diagnosis: Problem not documented

                                        On: 3-Richi-2020 9:00



 

                                        Appointment; ADULT, HEMOPHILIA 

Encounter Diagnosis: Problem not documented

                                        On: 2020 12:00



 

                                        Appointment; JONG LUND M.D. 

Encounter Diagnosis: Problem not documented

                                        On: 2020 13:30

## 2020-05-24 NOTE — XMS REPORT
Summary of Care: 20 - 20

                             Created on: 10/30/2070



WANDA ARITA

External Reference #: [id number]

: 1990

Sex: Female



Demographics





                          Address                   [address]

[place]  [address]

 

                          Home Phone                [phone]

 

                          Preferred Language        English

 

                          Marital Status            

 

                          Tenriism Affiliation     Episcopalian

 

                          Race                      White

 

                                        Additional Race(s)  

 

                          Ethnic Group              /Latin





Author





                          Author                    [facility]

[place]

 

                          [facility]

[place]

 

                          Address                   Unknown

 

                          Phone                     Unavailable







Encounter





MANDY Alonso(FIN) [id number] Date(s): 20 - 20

[facility] [place] [address] [place] [phone]
[phone]

Discharge Disposition: Home or Self Care

Attending Physician: Shayy Teran MD

Referring Physician: Shayy Teran MD





Vital Signs





No data available for this section



Problem List





    



              Condition    Effective Dates  Status       Health Status  Informan

t

 

    



                           Anemia(Confirmed)         Resolved  

 

    



                           Coagulopathy(Confirm      Active  



                                         ed)    

 

    



                           Morbid                    Active  



                                         obesity(Confirmed)    

 

    



                           Pre-eclampsia(Confir      Active  



                                         med)    

 

    



                     Pre-eclampsia(Confir  2014                Resolved  



                                         med)    

 

    



                     Pregnant(Confirmed)  10/3/14 - 1/11/15   Resolved  

 

    



                     Pregnant(Confirmed)  04 - 05   Resolved  

 

    



                     Pregnant(Confirmed)  06 - 3/29/07    Resolved  







Allergies, Adverse Reactions, Alerts





   



                 Substance       Reaction        Severity        Status

 

   



                           codeine                   Active

 

   



                           Zofran                    Active







Medications





No data available for this section



Results





No data available for this section



Immunizations





Given and Recorded



   



                 Vaccine         Date            Status          Refusal Reason

 

   



                     measles/mumps/rubella virus vaccine  1/12/15             Gi

dorian 







Procedures





    



              Procedure    Date         Related Diagnosis  Body Site    Status

 

    



                            section          Completed

 

    



                           Tubal ligation            Completed







Social History





 



                           Social History Type       Response

 

 



                           Sexual                    Sexually active: Yes.  Part

ner with STD? No.  History of sexual abuse: 

No.

 

 



                           Smoking Status            Never smoker; Type: Cigaret

kel; Exposure to Tobacco Smoke None;

Cigarette



                                         Smoking Last 365 Days No; Reg Smoking C

essation Counseling No



                                         entered on: 19







Assessment and Plan





No data available for this section

## 2020-05-24 NOTE — XMS REPORT
Summary of Care

                             Created on: 02/15/2020



WANDA ARITA

External Reference #: [id number]

: 1990

Sex: Female



Demographics





                          Address                   [address] [place] [address]

 

                          Preferred Language        English

 

                          Marital Status            Unknown

 

                          Religion Affiliation     Unknown

 

                          Race                      White

 

                          Ethnic Group              Non-





Author





                          Author                    ASHELY CONROY, WANDA SUNSHINE

 

                          Organization              Unknown

 

                          Address                   Unknown

 

                          Phone                     Unavailable







Care Team Providers





                    Care Team Member Name Role                Phone

 

                    ASHELY CONROY,  SHAYY Unavailable         Unavailable

 

                    SONIA ANTOINE,  FEROZ Unavailable         Unavailable

 

                    KAMALJIT BRANDT,  ALEJANDRO ARROYO  Unavailable         Unavailable

 

                    Jordan BRANDT,  Jef Unavailable         Unavailable

 

                    ASHELY BRANDT [place],  SHAYY Unavailable         Unavailable

 

                    TERESA BRANDT,  PABLO WALKER Unavailable         Unavailable

 

                    KAILEY BRANDT,  JAMEY ARROYO Unavailable         Unavailable

 

                    Mark BRANDT,  Salvador Unavailable         Unavailable

 

                          Unavailable               Unavailable







Functional Status





                    Name                Dates               Details

 

                                        Functional status health issues are not 

documented

                                                    Status: 







                    Name                Dates               Details

 

                                        Cognitive status health issues are not d

ocumented

                                                    Status: 







Problems





                    Name                Dates               Details

 

                                        Need for rubella vaccination (V04.3, Z23

) 

                                                    Status: Active

 

                                        Constipation (564.00, K59.00) 

                                                    Status: Active

 

                                        Morbid obesity (278.01, E66.01) 

                                                    Status: Active

 

                                        General counseling and advice for contra

ceptive management (V25.09, Z30.09) 

                                                    Status: Active

 

                                        Elevated blood pressure reading without 

diagnosis of hypertension (796.2, R03.0)



                                                    Status: Active

 

                                        Morbid obesity with body mass index (BMI

) of 45.0 to 49.9 in adult (278.01, 

E66.01) 

                                                    Status: Active

 

                                        Acid reflux (530.81, K21.9) 

                                                    Status: Active

 

                                        Epistaxis (784.7, R04.0) 

                                                    Status: Active

 

                                        Menorrhagia with irregular cycle (626.2,

 N92.1) 

                                                    Status: Active

 

                                        Type 2B von Willebrand's disease (286.4,

 D68.0) 

                                                    Status: Active

 

                                        Former smoker (V15.82, Z87.891) 

                                                    Status: Active

 

                                        Encounter for screening examination for 

sexually transmitted disease (V74.5, 

Z11.3) 

                                                    Status: Active

 

                                        Encounter for blood test for routine gen

eral physical examination (V72.62, 

Z00.00) 

                                                    Status: Active

 

                                        Encounter for routine gynecological exam

ination with Papanicolaou smear of 

cervix (V72.31, Z01.419) 

                                                    Status: Active

 

                                        Breast mass in female (611.72, N63.0) 

                                                    Status: Active

 

                                        Pelvic pain in female (625.9, R10.2) 

                                                    Status: Active

 

                                        Follow up (V67.9, Z09) 

                                                    Status: Active

 

                                        History of  section, unknown sca

r (V45.89, Z98.891) 

                                                    Status: Active







Medications





                    Name                Dates               Details

 

                                        Tranexamic Acid 650 MG Oral Tablet

Take 2 tablets po every 8 hours starting on day 1 of menses for a total of 5 
days



 

                                         Quantity: 30









FEROZ SCOTT N.P. 



                                         Start : 18-Sep-2019





Active





Allergies and Adverse Reactions





                    Name                Dates               Details

 

                    Codeine Derivatives (Allergy)                     Status: Ac

tive



 

                    Zofran TABS (Allergy)                     Reaction: Rash, It

anival

Status: Active









Past Medical History





                    Name                Dates               Details

 

                                        History of Anemia complicating pregnancy

 (648.20, O99.019) 

                                                    Status: Resolved

 

                                        History of High-risk pregnancy supervisi

on (V23.9, O09.90) 

                                                    Status: Resolved

 

                                        History of hyperemesis gravidarum (V13.2

9, Z87.59) 

                                                    Status: Resolved

 

                                        History of Hypertension complicating pre

gnancy, unspecified trimester (642.90, 

O16.9) 

                                                    Status: Resolved

 

                                        History of MTHFR mutation (V12.3, Z15.89

) 

                                                    Status: Resolved

 

                                        History of nausea (V12.79, Z87.898) 

                                                    Status: Resolved

 

                                        History of Nexplanon insertion (V25.5, Z

30.017) 

                                                    Status: Resolved







Procedures





                    Procedure           Dates               Details

 

                    [LH] TSH+Free T4    Date: 2020     

 

                    . [facility] - Affirm VPIII (BV Panel) Date: 2020     

 

                    . [facility] - PAP      Date: 2020     

 

                    [QH] HIV AB, HIV 1/2, EIA, WITH REFLEXES Date: 2020   

  

 

                    US Pelvis with Pelvis Transvaginal 78518 Date: 2020   

  

 

                    MA Digital Mammo DX Charlie  Date: 2020     

 

                    US Breast  Unilat 33921 Date: 2020     

 

                    CT Abdomen/Pelvis w/wo contrast 12575 Date: 2020     

 

                    History of  Section                     Completed 



 

                    History of Tubal Ligation                     Completed 









Immunization





                    Name                Dates               Details

 

                                        Fluzone Quadrivalent 0.5 ML Intramuscula

r Suspension #1

Lot #: [id number]            on: 3-Sep-2014             

 

                                        Tdap (Adacel) #1

Lot #: [id number]            on: 3-Sep-2014             

 

                                        Tdap (Boostrix) #1

Lot #: [id number]            on: 2014            







Family History





                    Name                Dates               Details

 

                                        Family history of essential hypertension

 (V17.49, Z82.49) 

                                                    Status: Active

 

                                        Family history of thyroid disease (V18.1

9, Z83.49) 

                                                    Status: Active

 

                                        Family history of type 2 diabetes [facility] (V18.0, Z83.3) 

                                                    Status: Active







Social History





                    Name                Dates               Details

 

                                        -

                                                    Status: 







                    Name                Dates               Details

 

                    Former smoker                            

 

                    Former smoker                            







Vital Signs





                Date            Test            Result          Details

 

                                                                 

 

                                        [id number]

                    BP Systolic         135 mm[Hg]          Status: Comments: Lo

cation: RUE; Position: Sitting



 

                    BP Diastolic        86 mm[Hg]           Status: Comments: Lo

cation: RUE; Position: Sitting



 

                    Height              70 in               Status: 



 

                    Weight              338 lb              Status: 



 

                    Body Mass Index Calculated 48.5 kg/m2          Status: 



 

                    Body Surface Area Calculated 2.61 m2             Status: 



 

                    Temperature         98.5 f              Status: Comments: Me

thod: Oral



 

                    Heart Rate          88 /min             Status: 









Results





                Date            Description     Value           Details

 

                    Results not documented                      

 

                                                                 







Plan of Care





                    Name                Dates               Details

 

                                        Planned Observations 

 

                    Planned Goals not documented                      

 

                                        Planned Encounters 

 

                                        Appointment; ADULT, HEMOPHILIA

                                        On: 3-Jeremy-2020 9:00









Interventions Provided

Discussion/Summary* 28 yo  presents for follow up 

* She has Hx of VW Type 2 A disease recently diagnosed, Hx of emergency 
   section at 30 weeks with BTL in  and Hx of Preeclampsia with severe 
  features, PP hemorrhage and epistaxis 

* She complains of: 

* 1- Menorrhagia and abnormal uterine bleeding, since , Periods are regular,
   but are very heavy and she has to change ultra tampons and overnights pads q 
  30 min from day 2 to day 5 of her periods which normal last 7-8 days. 

* She has bled to as low as a Hb of 4 g/dl and has received blood transfusion in
   the past. She has been continuously spotting for 2-3 months now,  

* Her bleeding is regular, Hb 11.9 g/dl 2020.  

* [facility] patient options for treatment, she has failed single agent tranexemic acid,
   and is following with Dr Ortega regarding VW disorder.  

* I Discussed with her hormonal treatment, and surgery. She declines,  

* She says she would like "a tubal reversal and another pregnancy". [facility] patients 
  risks of another pregnancy briefly. 

* 2- LLQ pain at the  scar incision, pain is cyclic, with during and 
  after the periods, associated with bulging on the  scar at the left 
  corner, suspect clinically a  scar endometrioma 

* CT scan 20: does not report endometrioma but looking at images I can see 
  a 3 cm endometrioma lesion on the left, see instance 84/102 

* Will need to review images with radiology again and would like to get Dr Turner's opinion. Referral placed. 

* 3- Spontaneous brownish discharge from the right nipple X 3 weeks not 
  associated with any pain: MMG and US 20 normal. 

* 4- Health maintenance: Pap and STD testing collected. Pending. 

* 5- Morbid obesity, patient is working to be seen for bariatric surgery as 
  well, 

* Spent 30 min in face to face discussion of the above. 

* Shayy Teran MD 

* [facility].





Instructions





                    Name                Dates               Details

 

                                        Instructions not documented

                                                     







Encounters





                                        Appointment; PABLO MOORE M.D. 

Encounter Diagnosis: Problem not documented

                                        On: 2018 13:30



 

                                        Appointment; [address] 

Encounter Diagnosis: Problem not documented

                                        On: 2018 14:00



 

                                        Appointment; PABLO MOORE M.D. 

Encounter Diagnosis: Problem not documented

                                        On: 2019 8:00



 

                                        Appointment; SALVADOR LOPEZ M.D . 

Encounter Diagnosis: Problem not documented

                                        On: 2019 9:00



 

                                        Appointment; ADULT, HEMOPHILIA 

Encounter Diagnosis: Problem not documented

                                        On: 18-Sep-2019 9:00



 

                                        Appointment; ADULT, HEMOPHILIA 

Encounter Diagnosis: Problem not documented

                                        On: 4-Dec-2019 9:00



 

                                        Appointment; SHAYY TERAN M.D. 

Encounter Diagnosis: Problem not documented

                                        On: 2020 14:15



 

                                        Appointment; ADULT, HEMOPHILIA 

Encounter Diagnosis: Problem not documented

                                        On: 3-Richi-2020 9:00



 

                                        Appointment; ADULT, HEMOPHILIA 

Encounter Diagnosis: Problem not documented

                                        On: 2020 12:00



 

                                        Appointment; SHAYY TERAN M.D. 

Encounter Diagnosis: Problem not documented

                                        On: 2020 13:30

## 2020-05-24 NOTE — XMS REPORT
Summary of Care: 1/10/15 - 1/13/15

                             Created on: 2038



WANDA MOBLEY

External Reference #: [id number]

: 1990

Sex: Female



Demographics





                          Address                   [address] [place] [address]-

 

                          Home Phone                [phone]

 

                          Preferred Language        English

 

                          Marital Status            Single

 

                          Latter-day Affiliation     Quaker

 

                          Race                      Other

 

                          Ethnic Group              /Latin





Author





                          Organization              Unknown

 

                          Address                   Unknown

 

                          Phone                     Unavailable







Encounter





[facility](NI[facility]) [id number] Date(s): 1/10/15 - 1/13/15

[address] [phone]
[phone]

Discharge Disposition: Home

Physician Attending: Melanie Quinteros MD

Physician Admitting: Melanie Quinteros MD





Reason for Visit





INDUCTION



Vital Signs





                    1                   2                   3



                                         Most recent to   



                                         oldest [Reference   



                                         Range]:   

 

                                        180.34 cm 

                    (1/10/15 9:31 PM)                        



                                         Height   

 

                                        98 DegF 

                          (1/13/15 9:30 AM)         98.1 DegF 

                          (1/13/15 12:22 AM)        98 DegF 

                                        (1/12/15 5:44 PM)



                                         Temperature Oral   



                                         [96.4-99.1 DegF]   

 

                                        143 mmHg 

*HI*

                          (1/13/15 9:30 AM)         134 mmHg 

                          (1/13/15 12:22 AM)        139 mmHg 

                                        (1/12/15 5:44 PM)



                                         Systolic Blood   



                                         Pressure [   



                                         mmHg]   

 

                                        83 mmHg 

                          (1/13/15 9:30 AM)         85 mmHg 

                          (1/13/15 12:22 AM)        85 mmHg 

                                        (1/12/15 5:44 PM)



                                         Diastolic Blood   



                                         Pressure [60-90   



                                         mmHg]   

 

                                        18 BRMIN 

                          (1/13/15 9:30 AM)         18 BRMIN 

                          (1/13/15 12:22 AM)        18 BRMIN 

                                        (1/12/15 5:44 PM)



                                         Respiratory Rate   



                                         [14-20 BRMIN]   

 

                                        78 bpm 

                          (1/13/15 9:30 AM)         84 bpm 

                          (1/13/15 12:22 AM)        90 bpm 

                                        (1/12/15 5:44 PM)



                                         Peripheral Pulse   



                                         Rate [ bpm]   

 

                                        147.273 kg 

                    (1/10/15 9:31 PM)                        



                                         Weight   

 

                                        45.28 m2 

                    (1/10/15 9:31 PM)                        



                                         Body Mass Index   







Problem List





    



              Condition    Effective Dates  Status       Health Status  Informan

t

 

    



                           Anemia(Confirmed)         Resolved  

 

    



                           Pre-eclampsia(Confir      Active  



                                         med)    

 

    



                     Pre-eclampsia(2014                Resolved  



                                         med)    

 

    



                     Pregnant(Confirmed)  04 - 05   Resolved  

 

    



                     Pregnant(Confirmed)  06 - 3/29/07    Resolved  

 

    



                     Pregnant(Confirmed)  10/3/14 - 1/11/15   Resolved  







Allergies, Adverse Reactions, Alerts





   



                 Substance       Reaction        Severity        Status

 

   



                           codeine                   Active

 

   



                           Zofran                    Active







Medications





acetaminophen

650 mg, 2 tab, Route: PO, Drug form: TAB, Q4H, Dosing Weight 147.273, kg, PRN He
adache 1-3, Start date: 01/11/15 17:22:00, Duration: 30 day, Stop date: 02/10/15
17:21:00

Notes: Do not exceed 4 gm/day.  (Same as: Tylenol)

Start Date: 1/11/15

Stop Date: 1/13/15

Status: Discontinued



bisacodyl

15 mg, 3 tab, Route: PO, Drug form: ECTAB, Daily, Dosing Weight 147.273, kg, PRN
Other -See Comment, Start date: 01/11/15 17:22:00, Duration: 30 day, Stop date: 
02/10/15 17:21:00

Notes: (Same As: Dulcolax, Correctol) (Do Not Crush)  "Do Not Crush"

Start Date: 1/11/15

Stop Date: 1/13/15

Status: Discontinued



bisacodyl

10 mg, 1 supp, Route: NH, Drug form: SUPP, PRN, Dosing Weight 147.273, kg, PRN O
ther -See Comment, Start date: 01/11/15 17:22:00, Duration: 30 day, Stop date: 0
2/10/15 17:21:00

Notes: (Same As: Dulcolax, Bisco-Lax)

Start Date: 1/11/15

Stop Date: 1/13/15

Status: Discontinued



butorphanol

2 mg, 1 mL, Route: IVP, Drug form: INJ, Q2H, Dosing Weight 147.273, kg, PRN Pain
Score 6-10, Start date: 01/10/15 22:13:00, Duration: 30 day, Stop date: 02/09/15
22:12:00

Notes: (Same As: Stadol)

Start Date: 1/10/15

Stop Date: 1/11/15

Status: Discontinued



butorphanol

1 mg, 0.5 mL, Route: IVP, Drug form: INJ, Q2H, Dosing Weight 147.273, kg, PRN Pa
in Score 1-5, Start date: 01/10/15 22:13:00, Duration: 30 day, Stop date: 02/09/
15 22:12:00

Notes: (Same As: Stadol)

Start Date: 1/10/15

Stop Date: 1/11/15

Status: Discontinued



carboprost

250 microgram, 1 mL, Route: IM, Drug form: INJ, ONCALL, Dosing Weight 147.273, k
g, Start date: 01/10/15 23:00:00, Duration: 30 day, Stop date: 02/09/15 22:59:00

Notes: (Same As: Hemabate)

Start Date: 1/10/15

Stop Date: 1/11/15

Status: Discontinued



Cervidil

10 mg, 1 supp, Route: VAG, Drug form: INS, ONCE, Dosing Weight 147.273, kg, Star
t date: 01/10/15 23:12:00, Duration: 1 doses or times, Stop date: 01/10/15 23:12
:00

Notes: (Same as: Cervidil)

Start Date: 1/10/15

Stop Date: 1/10/15

Status: Completed



citric acid-sodium citrate

30 mL, Route: PO, Drug Form: SOLN, Dosing Weight 147.273, kg, ONCALL, Start date
: 01/10/15 23:00:00, Duration: 30 day, Stop date: 02/09/15 22:59:00

Notes: (Same As: Sridhar, Cytra-2) Sodium citrate-citric acid (500-334 mg/5 mL):
1 mL contains sodium 1 mEq/mL and bicarbonate 1 mEq/mL

Start Date: 1/10/15

Stop Date: 1/11/15

Status: Discontinued



Colace 100 mg oral capsule

100 mg = 1 cap, PO, BID, Constipation, # 20 cap, 0 Refill(s)

Start Date: 1/12/15

Status: Ordered



Dermoplast 20% topical spray

1 spray, Route: TOP, PRN, Drug form: SPRY, PRN Irritation, Start date: 01/11/15 
17:22:00, Duration: 30 day, Stop date: 02/10/15 17:21:00

Notes: (Same As: Dermoplast)  FOR EXTERNAL USE ONLY

Start Date: 1/11/15

Stop Date: 1/13/15

Status: Discontinued



docusate

100 mg, 1 cap, Route: PO, Drug form: CAP, BID, Dosing Weight 147.273, kg, PRN Co
nstipation, Start date: 01/11/15 17:22:00, Duration: 30 day, Stop date: 02/10/15
17:21:00

Notes: (Same as: Colace) (Do Not Crush)

Start Date: 1/11/15

Stop Date: 1/13/15

Status: Discontinued



famotidine

20 mg, 2 mL, Route: IVP, Drug form: INJ, ONCALL, Dosing Weight 147.273, kg, Star
t date: 01/10/15 23:00:00, Duration: 30 day, Stop date: 02/09/15 22:59:00

Notes: (Same as: Pepcid)Can be dilute in 5-10cc NS  IVP: Slow IV push over at le
ast 2 minutes.

Start Date: 1/10/15

Stop Date: 1/11/15

Status: Discontinued



ferrous sulfate 325 mg oral enteric coated tablet

325 mg = 1 tab, PO, Daily, # 30 tab, 0 Refill(s)

Start Date: 1/12/15

Status: Ordered



Hydrocortisone-Aloe 0.5% topical cream

1 appl, Route: TOP, BID, Drug form: CRM, Start date: 01/13/15 9:00:00, Duration:
30 day, Stop date: 02/11/15 17:00:00

Start Date: 1/13/15

Stop Date: 1/13/15

Status: Discontinued



ibuprofen

800 mg, 1 tab, Route: PO, Drug form: TAB, Q8H, Dosing Weight 147.273, kg, PRN Pa
in Score 6-10, Start date: 01/11/15 17:22:00, Duration: 30 day, Stop date: 02/10
/15 17:21:00

Notes: (Same as: Motrin)"Do Not Crush"  Take with food.

Start Date: 1/11/15

Stop Date: 1/13/15

Status: Discontinued



ibuprofen

600 mg, 1 tab, Route: PO, Drug form: TAB, Q6H, Dosing Weight 147.273, kg, PRN Pa
in Score 1-5, Start date: 01/11/15 17:22:00, Duration: 30 day, Stop date: 02/10/
15 17:21:00

Notes: (Same as: Motrin)"Do Not Crush"  Take with food.

Start Date: 1/11/15

Stop Date: 1/13/15

Status: Discontinued



ibuprofen 800 mg oral tablet

800 mg = 1 tab, PO, Q8H, Pain Score 6-10, # 30 tab, 0 Refill(s)

Start Date: 1/12/15

Status: Ordered



Lactated Ringers (Bolus) IV

1,000 mL, 1,000 ml/hr, Infuse Over: 1 hr, Route: IV, 1,000, Drug form: INJ, ONCE
, Dosing Weight 147.273 kg, Start date: 01/10/15 22:13:00, Stop date: 01/10/15 2
2:13:00, Bolus for regional anesthesia per unit protocol

Start Date: 1/10/15

Stop Date: 1/11/15

Status: Discontinued



Lactated Ringers IV 1,000 mL

1,000 mL, Rate: 125 ml/hr, Infuse over: 8 hr, Route: IV, Dosing Weight 147.273 k
g, Total Volume: 1,000, Start date: 01/10/15 22:13:00, Duration: 30 day, Stop da
te: 02/09/15 22:12:00

Start Date: 1/10/15

Stop Date: 1/11/15

Status: Discontinued



Lactated Ringers IV 1,000 mL

1,000 mL, Rate: 100 ml/hr, Infuse over: 10 hr, Route: IV, Dosing Weight 147.273 
kg, Total Volume: 1,000, Start date: 01/11/15 17:22:00, Duration: 30 day, Stop d
ate: 02/10/15 17:21:00

Start Date: 1/11/15

Stop Date: 1/13/15

Status: Discontinued



lanolin topical

1 appl, Route: TOP, PRN, Drug form: OINT, PRN Other -See Comment, Start date: 01
/11/15 17:22:00, Duration: 30 day, Stop date: 02/10/15 17:21:00

Notes: (Same as:Lanolin)

Start Date: 1/11/15

Stop Date: 1/13/15

Status: Discontinued



lidocaine 1%

20 mL, Route: PERCUT, Drug Form: INJ, Dosing Weight 147.273, kg, PRN, PRN Other 
-See Comment, Start date: 01/10/15 22:13:00, Duration: 1 doses or times, Stop da
te: 01/12/15 0:00:00

Notes: (Same as: Xylocaine)

Start Date: 1/10/15

Stop Date: 1/11/15

Status: Discontinued



lidocaine 1% injectable solution

0.25 mL, Route: INTRADERM, Drug Form: INJ, Dosing Weight 147.273, kg, PRN, PRN O
ther -See Comment, Start date: 01/10/15 22:13:00, Duration: 30 day, Stop date: 0
2/09/15 22:12:00

Notes: (Same as: Xylocaine)

Start Date: 1/10/15

Stop Date: 1/11/15

Status: Discontinued



Lomotil oral tablet

2 tab, Route: PO, Drug Form: TAB, Dosing Weight 147.273, kg, QID, PRN Loose Stoo
ls, Start date: 01/12/15 4:33:00, Duration: 30 day, Stop date: 02/11/15 4:32:00

Notes: (Same As: Lomotil) MAX Adult dose = 8 tabs/day

Start Date: 1/12/15

Stop Date: 1/13/15

Status: Discontinued



Lomotil oral tablet

2 tab, PO, QID, Loose Stools, 0 Refill(s)

Start Date: 1/12/15

Stop Date: 1/15/15

Status: Ordered



M-M-R II

0.5 mL, Route: SUB-Q, Drug Form: PDR/INJ, Dosing Weight 147.273, kg, ONCALL, Giv
e only if patient rubella non-immune, Start date: 01/11/15 18:00:00, Duration: 1
doses or times

Notes: (Same as: M-M-R II) (measles-mumps-rubella virus vaccine 0.5 ml INJ VL)  
GIVE PRIOR TO DISCHARGE

Start Date: 1/11/15

Stop Date: 1/12/15

Status: Deleted



methylergonovine

0.2 mg, 1 mL, Route: IM, Drug form: INJ, ONCALL, Dosing Weight 147.273, kg, Star
t date: 01/10/15 23:00:00, Duration: 30 day, Stop date: 02/09/15 22:59:00

Notes: (Same as:Methergine)

Start Date: 1/10/15

Stop Date: 1/11/15

Status: Discontinued



methylergonovine

0.2 mg, 1 mL, Route: IM, Drug form: INJ, PRN, Dosing Weight 147.273, kg, PRN Oth
er -See Comment, Start date: 01/11/15 17:22:00, Duration: 30 day, Stop date: 02/
10/15 17:21:00

Notes: (Same as:Methergine)

Start Date: 1/11/15

Stop Date: 1/13/15

Status: Discontinued



misoprostol

1,000 microgram, 5 tab, Route: NH, Drug form: TAB, ONCALL, Dosing Weight 147.273
, kg, Start date: 01/10/15 23:00:00, Duration: 1 doses or times, Stop date: 01/1
2/15 0:00:00

Notes: (Same as:Cytotec)   Take with food

Start Date: 1/10/15

Stop Date: 1/11/15

Status: Completed



NS 500ml + Pitocin 30 units (Titrate) IV 30 unit

30 unit, 500 mL, Rate: 42 ml/hr, Infuse over: 11.9 hr, Dosing Weight 147.273, kg
, Route: IV, Total Volume: 500 mL, Start date: 01/10/15 22:13:00, Duration: 2 da
y, Stop date: 01/12/15 22:12:00, Replace Every: 11.9 hr

Notes: (Same as: OXYTOCIN-D5LR)

Start Date: 1/10/15

Stop Date: 1/11/15

Status: Discontinued



NS 500ml + Pitocin 30 units (Titrate) IV 30 unit

30 unit, 500 mL, Rate: 42 ml/hr, Infuse over: 11.9 hr, Dosing Weight 147.273, kg
, Route: IV, Total Volume: 500 mL, Start date: 01/11/15 17:22:00, Duration: 2 do
ses or times, Stop date: 01/12/15 17:09:00, Replace Every: 11.9 hr

Notes: (Same as: OXYTOCIN-D5LR)

Start Date: 1/11/15

Stop Date: 1/12/15

Status: Completed



ondansetron

4 mg, 2 mL, Route: IVP, Drug form: INJ, Q8H, Dosing Weight 147.273, kg, PRN Naus
ea & Vomiting, Start date: 01/10/15 22:13:00, Duration: 30 day, Stop date: 
02/09/15 22:12:00

Notes: (Same as: Zofran)

Start Date: 1/10/15

Stop Date: 1/11/15

Status: Discontinued



oxytocin 30 units in D5LR 500mL (Titrate) IV 30 unit

30 unit, 500 mL, Rate: Titrate, Dosing Weight 147.273, kg, Route: IV, Total Volu
me: 500 mL, Start date: 01/11/15 12:46:00, Duration: 2 day, Stop date: 01/13/15 
12:45:00, Replace Every: 24 hr

Notes: (Same as: OXYTOCIN-D5LR)

Start Date: 1/11/15

Stop Date: 1/11/15

Status: Discontinued



Prenatal Multivitamins oral tablet

1 tab, Route: PO, Drug Form: TAB, Dosing Weight 147.273, kg, Daily, Start date: 
01/12/15 9:00:00, Duration: 30 day, Stop date: 02/10/15 9:00:00

Start Date: 1/12/15

Stop Date: 1/13/15

Status: Discontinued



Remove - dinoprostone (Cervidil) insert

1 ea, Route: VAG, Drug Form: INS, Dosing Weight 147.273, kg, ONCALL, Start date:
01/11/15 12:00:00, Duration: 1 day, Stop date: 01/12/15 11:59:00

Notes: Vaginal insert: to be removed 1 hour prior to oxytocin administration or 
12 hours after insertion.

Start Date: 1/11/15

Stop Date: 1/11/15

Status: Completed



terbutaline

0.25 mg, 0.25 mL, Route: SUB-Q, Drug form: INJ, PRN, Dosing Weight 147.273, kg, 
PRN Other -See Comment, Start date: 01/10/15 22:13:00, Duration: 1 doses or time
s, Stop date: 01/12/15 0:00:00

Notes: **DO NOT USE IN OB/GYN AREA**(Same As: Brethine)

Start Date: 1/10/15

Stop Date: 1/11/15

Status: Discontinued



zolpidem

5 mg, 1 tab, Route: PO, Drug form: TAB, Bedtime, Dosing Weight 147.273, kg, PRN 
Sleep, Start date: 01/11/15 17:22:00, Duration: 30 day, Stop date: 02/10/15 17:2
1:00

Notes: (Same As: Ambien)

Start Date: 1/11/15

Stop Date: 1/13/15

Status: Discontinued



Results





BLOOD BANK RESULTS



  



                     Most recent to      1                   2



                                         oldest [Reference  



                                         Range]:  

 

  



                           ABO/Rh                    O POS 



                                         *Unknown* 



                                         (1/10/15 10:25 PM) 

 

  



                           Antibody Scrn             Negative 



                                         (1/10/15 10:25 PM) 

 

  



                           FFP product               Product available 



                                         (1/11/15 4:34 PM) 

 

  



                     RBC product         Product available   Product available



                           (1/11/15 9:55 AM)         (1/11/15 7:13 AM)







IMMUNOLOGY



  



                     Most recent to      1                   2



                                         oldest [Reference  



                                         Range]:  

 

  



                           Treponemal Scr [Non       Non Reactive 



                           Reactive]                 *NA* 



                                         (1/10/15 10:25 PM) 

 

  



                           Hep Bs Ag [Negative]      Negative 



                                         *NA* 



                                         (1/10/15 10:25 PM) 







HEMATOLOGY



  



                     Most recent to      1                   2



                                         oldest [Reference  



                                         Range]:  

 

  



                           WBC [3.7-10.4 K/CMM]      9.3 K/CMM 



                                         (1/10/15 10:25 PM) 

 

  



                           RBC [4.20-5.40            3.73 M/CMM 



                           M/CMM]                    *LOW* 



                                         (1/10/15 10:25 PM) 

 

  



                     Hgb [12.0-16.0 g/dL]  9.4 g/dL            10.3 g/dL



                           *LOW*                     *LOW*



                           (1/12/15 5:40 AM)         (1/10/15 10:25 PM)

 

  



                     Hct [36.0-48.0 %]   28.1 %              32.1 %



                           *LOW*                     *LOW*



                           (1/12/15 5:40 AM)         (1/10/15 10:25 PM)

 

  



                           MCV [80.0-98.0 fL]        85.9 fL 



                                         (1/10/15 10:25 PM) 

 

  



                           MCH [27.0-31.0 pg]        27.6 pg 



                                         (1/10/15 10:25 PM) 

 

  



                           MCHC [32.0-36.0           32.1 g/dL 



                           g/dL]                     (1/10/15 10:25 PM) 

 

  



                           RDW [11.5-14.5 %]         15.2 % 



                                         *HI* 



                                         (1/10/15 10:25 PM) 

 

  



                           Platelet [133-450         210 K/CMM 



                           K/CMM]                    (1/10/15 10:25 PM) 

 

  



                           MPV [7.4-10.4 fL]         10.3 fL 



                                         (1/10/15 10:25 PM) 

 

  



                           Segs [45.0-75.0 %]        68.6 % 



                                         (1/10/15 10:25 PM) 

 

  



                           Lymphocytes               22.2 % 



                           [20.0-40.0 %]             (1/10/15 10:25 PM) 

 

  



                           Monocytes [2.0-12.0       6.0 % 



                           %]                        (1/10/15 10:25 PM) 

 

  



                           Eosinophils [0.0-4.0      2.4 % 



                           %]                        (1/10/15 10:25 PM) 

 

  



                           Basophils [0.0-1.0        0.8 % 



                           %]                        (1/10/15 10:25 PM) 

 

  



                           Segs-Bands #              6.4 K/CMM 



                           [1.5-8.1 K/CMM]           (1/10/15 10:25 PM) 

 

  



                           Lymphocytes #             2.1 K/CMM 



                           [1.0-5.5 K/CMM]           (1/10/15 10:25 PM) 

 

  



                           Monocytes # [0.0-0.8      0.6 K/CMM 



                           K/CMM]                    (1/10/15 10:25 PM) 

 

  



                           Eosinophils #             0.2 K/CMM 



                           [0.0-0.5 K/CMM]           (1/10/15 10:25 PM) 

 

  



                           Basophils # [0.0-0.2      0.1 K/CMM 



                           K/CMM]                    (1/10/15 10:25 PM) 







Medications Administered During Your Visit





No data available for this section



Immunizations





  



                     Vaccine             Date                Refusal Reason

 

  



                           measles/mumps/rubella virus vaccine  1/12/15 







Social History





 



                           Social History Type       Response

 

 



                           Sexual                    Sexually active: Yes, Sexua

l Partner With STD No, History of sexual 

abuse:



                                         No

 

 



                           Smoking Status            Former smoker, Exposure to 

Tobacco Smoke None, Cigarette 

Smoking Last 365



                                         Days No, Reg Smoking Cessation Counseli

ng No







Assessment and Plan

Extracted from:



  



                     Title: OB Postpartum Progress Note  Author: Wendy Moss MD  Date: 

1/12/15









Patient:   WANDA MOBLEY            MRN: [id number]            FIN: [id number]

Age:   24 years     Sex:  Female     :  1990

Associated Diagnoses:   None

Author:   Wendy Moss MD



Subjective

Reports pain is adequately controlled. Denies any s/sx of anemia. AMbulating. PT
reports having 5 episodes of diarrhea. Denies any fever or chills. Reports 
requiring 5 pads overnight. Reports that she saw a clot yesterday, no clots 
since.



Physical Examination

VS/Measurements

Inpatient Vital signs (ST)

VitalsTmp(F)[id number]

                                         23:[id number]/6818------

                                         21:4198.745435/7918------

                                         19:30----86083/89--------

                                         18:45----75675/71--------

                                         18:30----84277/67--------





                                        24 Hr Tmax: 98.9F (37.17c) at  23:4

9Vital Signs are the last 5 in the past 

48 hours.





gen-NAD

CV-RRR

Lung-CTAB

Abd- soft, nontender, firm fundus below umbilicus

LE- nontender calfs bilaterally



Review / Management

Results review:

Labs (Last four charted values)

WBC                 9.3(ROXANNA 10)

Hgb                 L 10.3(ROXANNA 10)

Hct                 L 32.1(ROXANNA 10)

Plt                 210(ROXANNA 10).



Impression and Plan

                                        24 yr old  s/p TSVD

                                        1. PPD#1: AFVSS, one mild range bp last 

night. Pt has hx of GHTN. Continue to 

monitor closely.

                                        2. Anemia: 10.3 --> EBL 400cc pt denies 

signs/symptoms. Continue to monitor 

closely. Place pt on strict peripad count, given hx of pph and transfusions.

                                        3. Diarrhea: pt is s/p cytotec from [facility]

very. (could possibly be side affect of 

medication). Lomotil to be given now. Continue monitoring.

                                        4. GHTN: no meds.  1 mild range bp last 

night.  Pt denies symptoms.  UPCR 0.1 

Continue to monitor BP

                                        5. MTHFR deficiency- heterozygote, on no

 meds.

                                        6. O+/Rb NI--> for MMR pp

                                        7. Br&Mauricio/BCM- Nexplanon

                                        8. Dispo: Continue with routine pp care,

 consider d/c in PM.



Wendy Moss MD, PGYI







 



                           Addendum                  Attending Note



                           by                        Pt discussed with OB team, 

agree with plan.



                           Manoj                PPD#1- stable.  Acute on ch

ronic anemia- Hb 9.4, stable, 

asymptomatic, rx iron at



                           wal, Marty                 discharge.



                           S MD on                   GHTN- BPs stable, asymptoma

tic.



                           2015                RNI--> needs MMR at dischar

ge.



                           11:52                     Routine care.





                                         Marty Dominguez MD

 

 



                           Addendum                  R1 Progress Note





                           by Eduardo,                Called to bedside 2/2 aller

gic reaction on back 2/2 to tape from 

epidural.



                           Tiffanie Chisholm                Pt c/o pruitis and requesti

ng a topical cream





                           MD on                     Examination showed erythema

tous wheals, with excoriation marks from 

scratching noted



                           2015                bilaterally on lower back i

n the distribution of where the tape 

was.



                           22:19                     No vesicles or purulent dis

charge noted.





                                         Pt requesting topical cream instead of 

po medication.





                                         Will rx hydrocortisone-aloe cream.





                                         Tiffanie Clark, PGY1





Extracted from:



  



                     Title: OB H&P       Author: Wendy Moss MD  Date: 1/

10/15







                                         Impression and Plan

A/P: Ms. Mobley is a 24 yr old  at 36wk6d c/w doc 12wk US present for IOL
2/2 GHTN.



                                        1) IOL- Pt is at 0.5/th/high/med/post wi

th villanueva score of -1. Will ripen with 

cervidil.

                                        2)GHTN- Pt's BP were in mild range in cl

inic, not on any meds. Denies any preE 

symptoms. Baseline UPCR: 0.1. Continue to monitor BP.

                                        3) Pain- WE

                                        4) GBS neg/3THIV neg

                                        5) C/ 2889 grams on sono on 1/7/15 (BPD>

95th%)

                                        6) Hx of PPH in prior deliveries- will k

eep uterotonics at bedside.

                                        7) Anemia- Last Hb: 10.9 on iron daily.

                                        8) FOB's brother has down syndrome--> s/

p G/C, MSAFP wnl, NIPT wnl.

                                        9)MTHFR deficiency- heterozygote, on no 

meds.

                                        10) RH+/Rb NI--> for MMR pp

                                        11) Br&Mauricio/BCM- NExplanon

                                        12) Dispo: admit for IOL , plan to ripen

 with cervidil.



D/W Dr. Cuate Moss MD, PGYI

## 2020-05-24 NOTE — XMS REPORT
Summary of Care: 10/23/14 - 10/23/14

                             Created on: 2016



WANDA ARITA

External Reference #: [id number]

: 1990

Sex: Female



Demographics





                          Address                   [address] [place] [address]-

 

                          Home Phone                [phone]

 

                          Preferred Language        English

 

                          Marital Status            Single

 

                          Druze Affiliation     Sabianist

 

                          Race                      Other

 

                          Ethnic Group              /Latin





Author





                          Organization              Unknown

 

                          Address                   Unknown

 

                          Phone                     Unavailable







Encounter





MANDY Alonso(FIN) [id number] Date(s): 10/23/14 - 10/23/14

[address] [phone]
[phone]

Discharge Disposition: Home

Physician Attending: Melanie Quinteros MD

Physician Admitting: Melanie Quinteros MD





Reason for Visit





decreased fetal movement



Vital Signs





 



                           Most recent to            1



                                         oldest [Reference 



                                         Range]: 

 

 



                           Height                    180.34 cm



                                         (10/23/14 12:19 PM)

 

 



                           Temperature Oral          98.5 DegF



                           [96.4-99.1 DegF]          (10/23/14 12:20 PM)

 

 



                           Systolic Blood            133 mmHg



                           Pressure [          (10/23/14 12:20 PM)



                                         mmHg] 

 

 



                           Diastolic Blood           74 mmHg



                           Pressure [60-90           (10/23/14 12:20 PM)



                                         mmHg] 

 

 



                           Respiratory Rate          18 BRMIN



                           [14-20 BRMIN]             (10/23/14 12:20 PM)

 

 



                           Peripheral Pulse          94 bpm



                           Rate [ bpm]         (10/23/14 12:20 PM)

 

 



                           Weight                    139.545 kg



                                         (10/23/14 12:19 PM)

 

 



                           Body Mass Index           42.91 m2



                                         (10/23/14 12:19 PM)







Problem List





    



              Condition    Effective Dates  Status       Health Status  Informan

t

 

    



                           Anemia(Confirmed)         Resolved  

 

    



                     Pregnant(Confirmed)  04 - 05   Resolved  

 

    



                     Pregnant(Confirmed)  06 - 3/29/07    Resolved  

 

    



                     Pregnant(Confirmed)  10/3/14             Active  







Allergies, Adverse Reactions, Alerts





   



                 Substance       Reaction        Severity        Status

 

   



                           codeine                   Active

 

   



                           Zofran                    Active







Medications





No data available for this section



Medications Administered During Your Visit





No data available for this section



Immunizations





No data available for this section



Social History





 



                           Social History Type       Response

 

 



                           Smoking Status            Never smoker, Type: Cigaret

kel, Exposure to Tobacco Smoke None,

Cigarette



                                         Smoking Last 365 Days No, Reg Smoking C

essation Counseling No







Assessment and Plan

Extracted from:



  



                     Title: Clinical Document  Author: Suellen Farris MD

ate: 10/23/14







                                        R2 Triage OB/GYN H&P

LMP: Unsure

EDC: 2015

EGA: 25wk4d



CC: decreased FM



HPI: 22yo  @ 25wk4d d/b doc 9wk kapil, with CHTN and MTHFR deficiency, 
presents with c/o no FM since 8PM last night. Does endorse FM since in the room.
Denies LOF, VB, and contractions. Endorses "round ligament pain." Does endorse 
HA earlier today, since resolved. Denies RUQ pain or epigastric pain.



PNC:  Dr. Quinteros, Last appointment: 10/8, next appointment: 10/29/14

                                        1. Dated as above

                                        2. Hospitalizations: Hospitalized - with hyperemesis gravidarum. 

Currently taking reglan and phenergan TID and pepcid qd.

                                        3. CHTN - Diagnosed with elevated BPs th

is pregnancy. Baseline PIH panel 

negative and Urine Pr:Cr ratio 0.1. Not on medications.

                                        4. FOB has brother with DS - s/p GC. s/p

 NIPT wnl

                                        5. MTHFR deficiency - Heterozygote. On n

o medications.

                                        6. Rubella Equivocal - Needs MMR postpar

hillary

                                        7. H/o postpartum hemmorrhage - Required

 blood transfusions with two previous 

deliveries

                                        8. PNLs: O+/-, Rub Equivocal, RPR NR, He

p B neg, 1T HIV neg, GC/Chl neg, Affirm 

neg

                                        9. Br / + epidural / Nexplanon



OB Hx:

                                        , Male, 8#10, TSVD, [facility], complic

ated by PPH requiring blood transfusion 

(1U)

                                        , Female, 8#4, TSVD, [facility], compli

cated by PPH requiring blood transfusion

(3U)



Gyn Hx:

                                        11 / regular / 7-8 days

No STDs

No abnormal paps



PMH: CHTN, MTHFR deficiency

PSH: None

Meds: PNV, Phenergan, reglan, pepcid, FA

All: Zofran-> Rash, Codeine-> rash

FH: Mom - DM, HTN, Thyroid Disease

SH: Negative x3, previous smoker



PE:

VitalsTmp(F)Tmp(C)[id number]

                                        10/23 12:[id number]---[id number]---

------



                                        24 Hr Tmax: 98.5F (36.94c) at 10/23 12:2

024 Hr Tmin: 98.5F (36.94c) at 10/23 

12:20

                                        36 Hr Tmax: 98.5F (36.94c) at 10/23 12:2

036 Hr Tmin: 98.5F (36.94c) at 10/23 

12:20

Vital Signs are the last 5 in the past 48 hours.    Weights are the last 5 in 60
days, plus initial.



DateWt(kg)Wt(lb)Ht(cm)Ht(in)MethodBMIBSA

                                        10/23 (initial)139.54 307.70550.34 71.00

Stated 42.92.64



GEN: NAD

CV: RRR

RESP: CTAB

ABD: Obese, nontender

EXT: No calf tenderness

FHTs: 150s/ +accels/ mod btbv/ occ small variables

Sono: SIUP, +gross and fine FM, SHELBY wnl



A/P: 22yo  @ 25wk4d d/b doc 9wk kapil, with CHTN and MTHFR deficiency, 
presents with c/o no FM



                                        1. No FM: Ptnt reports no FM since 8PM l

ast night however reports that since she

has been in triage she has felt the baby moving much better.  Showed ptnt fetal 
heart beat on monitor and FHTs and movement on sono and ptnt feeling much better
reporting she can feel the baby kicking her now. Precautions given. Will D/C to 
home, to f/u this week with Dr. Quinteros at scheduled appointment.

                                        2. CHTN - Diagnosed with elevated BPs th

is pregnancy. Baseline PIH panel 

negative and Urine Pr:Cr ratio 0.1. Not on medications. BP wnl today.

                                        3. H/o postpartum hemmorrhage X2 - Requi

red blood transfusions with two previous

deliveries. Will T&C while in labor.

                                        4. FOB has brother with DS - s/p GC. s/p

 NIPT which was neg per ptnt.

                                        5. MTHFR deficiency - Heterozygote. On n

o medications.

                                        6. Rubella Equivocal - Needs MMR postpar

hillary

                                        7. Br / + epidural / Nexplanon

                                        8. IUP @ 25wk4d: +FHTs very reassuring, 

+FM

                                        9. Ptnt d/w Dr. Hawkins who agrees with the

 above plan



Suellen Farris MD, PGY2

## 2020-05-24 NOTE — XMS REPORT
Summary of Care

                             Created on: 2020



WANDA ARITA

External Reference #: [id number]

: 1990

Sex: Female



Demographics





                          Address                   [address] [place]  [address]

 

                          Preferred Language        English

 

                          Marital Status            Unknown

 

                          Pentecostal Affiliation     Unknown

 

                          Race                      White

 

                          Ethnic Group              Non-





Author





                          Author                    [facility]

 

                          Organization              [facility]

 

                          Address                   [address] [place]  [address]



 

                          Phone                     Unavailable







Care Team Providers





                    Care Team Member Name Role                Phone

 

                    SONIA ANTOINE,  FEROZ Unavailable         Unavailable

 

                    KAMALJIT BRANDT,  ALEJANDRO ARROYO  Unavailable         Unavailable

 

                    Jordan BRANDT,  Jef Unavailable         Unavailable

 

                    ASHELY BRANDT [place],  JONG Unavailable         Unavailable

 

                    TERESA BRANDT,  PABLO WALKER Unavailable         Unavailable

 

                    KAILEY BRANDT,  JAMEY ARROYO Unavailable         Unavailable

 

                    Mark BRANDT,  Salvador Unavailable         Unavailable

 

                          Unavailable               Unavailable







Functional Status





                    Name                Dates               Details

 

                                        Functional status health issues are not 

documented

                                                    Status: 







                    Name                Dates               Details

 

                                        Cognitive status health issues are not d

ocumented

                                                    Status: 







Problems





                    Name                Dates               Details

 

                                        Need for rubella vaccination (V04.3, Z23

) 

                                                    Status: Active

 

                                        Constipation (564.00, K59.00) 

                                                    Status: Active

 

                                        Morbid obesity (278.01, E66.01) 

                                                    Status: Active

 

                                        General counseling and advice for contra

ceptive management (V25.09, Z30.09) 

                                                    Status: Active

 

                                        Elevated blood pressure reading without 

diagnosis of hypertension (796.2, R03.0)



                                                    Status: Active

 

                                        Morbid obesity with body mass index (BMI

) of 45.0 to 49.9 in adult (278.01, 

E66.01) 

                                                    Status: Active

 

                                        Acid reflux (530.81, K21.9) 

                                                    Status: Active

 

                                        Epistaxis (784.7, R04.0) 

                                                    Status: Active

 

                                        Menorrhagia with irregular cycle (626.2,

 N92.1) 

                                                    Status: Active

 

                                        Type 2B von Willebrand's disease (286.4,

 D68.0) 

                                                    Status: Active

 

                                        Former smoker (V15.82, Z87.891) 

                                                    Status: Active

 

                                        Encounter for screening examination for 

sexually transmitted disease (V74.5, 

Z11.3) 

                                                    Status: Active

 

                                        Encounter for blood test for routine gen

eral physical examination (V72.62, 

Z00.00) 

                                                    Status: Active

 

                                        Encounter for routine gynecological exam

ination with Papanicolaou smear of 

cervix (V72.31, Z01.419) 

                                                    Status: Active

 

                                        Breast mass in female (611.72, N63.0) 

                                                    Status: Active

 

                                        Pelvic pain in female (625.9, R10.2) 

                                                    Status: Active







Medications





                    Name                Dates               Details

 

                                        Tranexamic Acid 650 MG Oral Tablet

Take 2 tablets po every 8 hours starting on day 1 of menses for a total of 5 
days



 

                                         Quantity: 30









FEROZ SCOTT N.P. * 



                                         Start : 18-Sep-2019





Active

Iron 100 Plus 100-250-0.025-1 MG Oral Tablet

* 



                                         Refills: 0







* 



                                         Start : 2020





Active





Allergies and Adverse Reactions





                    Name                Dates               Details

 

                    Codeine Derivatives (Allergy)                     Status: Ac

tive



 

                    Zofran TABS (Allergy)                     Reaction: Rash, It

anival

Status: Active









Past Medical History





                    Name                Dates               Details

 

                                        History of Anemia complicating pregnancy

 (648.20, O99.019) 

                                                    Status: Resolved

 

                                        History of High-risk pregnancy supervisi

on (V23.9, O09.90) 

                                                    Status: Resolved

 

                                        History of hyperemesis gravidarum (V13.2

9, Z87.59) 

                                                    Status: Resolved

 

                                        History of Hypertension complicating pre

gnancy, unspecified trimester (642.90, 

O16.9) 

                                                    Status: Resolved

 

                                        History of MTHFR mutation (V12.3, Z15.89

) 

                                                    Status: Resolved

 

                                        History of nausea (V12.79, Z87.898) 

                                                    Status: Resolved

 

                                        History of Nexplanon insertion (V25.5, Z

30.017) 

                                                    Status: Resolved







Procedures





                    Procedure           Dates               Details

 

                    [LH] TSH+Free T4    Date: 2020     

 

                    . [facility] - Affirm VPIII (BV Panel) Date: 2020     

 

                    . [facility] - PAP      Date: 2020     

 

                    [QH] HIV AB, HIV 1/2, EIA, WITH REFLEXES Date: 2020   

  

 

                    US Pelvis with Pelvis Transvaginal 25350 Date: 2020   

  

 

                    MA Digital Mammo DX Charlie  Date: 2020     

 

                    US Breast  Unilat 02732 Date: 2020     

 

                    CT Abdomen/Pelvis w/wo contrast 78636 Date: 2020     

 

                    History of  Section                     Completed 



 

                    History of Tubal Ligation                     Completed 









Immunization





                    Name                Dates               Details

 

                                        Fluzone Quadrivalent 0.5 ML Intramuscula

r Suspension #1

Lot #: [id number]            on: 3-Sep-2014             

 

                                        Tdap (Adacel) #1

Lot #: [id number]            on: 3-Sep-2014             

 

                                        Tdap (Boostrix) #1

Lot #: [id number]            on: 2014            







Family History





                    Name                Dates               Details

 

                                        Family history of essential hypertension

 (V17.49, Z82.49) 

                                                    Status: Active

 

                                        Family history of thyroid disease (V18.1

9, Z83.49) 

                                                    Status: Active

 

                                        Family history of type 2 diabetes [facility] (V18.0, Z83.3) 

                                                    Status: Active







Social History





                    Name                Dates               Details

 

                                        -

                                                    Status: 







                    Name                Dates               Details

 

                    Former smoker                            

 

                    Former smoker                            







Vital Signs





                Date            Test            Result          Details

 

                                                                 

 

                    No Known Vitals to report                      







Results





                Date            Description     Value           Details

 

                    Results not documented                      

 

                                                                 







Plan of Care





                    Name                Dates               Details

 

                                        Planned Observations 

 

                    Planned Goals not documented                      

 

                                        Planned Encounters 

 

                                        Appointment; JONG LUND M.D.

                                        On: 2020 15:00



 

                                        Appointment; ADULT, HEMOPHILIA

                                        On: 3-Jeremy-2020 9:00









Instructions





                    Name                Dates               Details

 

                                        Instructions not documented

                                                     







Encounters





                                        Appointment; PABLO MOORE M.D. 

Encounter Diagnosis: Problem not documented

                                        On: 2018 13:30



 

                                        Appointment; [address] 

Encounter Diagnosis: Problem not documented

                                        On: 2018 14:00



 

                                        Appointment; PABLO MOORE M.D. 

Encounter Diagnosis: Problem not documented

                                        On: 2019 8:00



 

                                        Appointment; SALVADOR LOPEZ M.D . 

Encounter Diagnosis: Problem not documented

                                        On: 2019 9:00



 

                                        Appointment; ADULT, HEMOPHILIA 

Encounter Diagnosis: Problem not documented

                                        On: 18-Sep-2019 9:00



 

                                        Appointment; ADULT, HEMOPHILIA 

Encounter Diagnosis: Problem not documented

                                        On: 4-Dec-2019 9:00



 

                                        Appointment; JONG LUND M.D. 

Encounter Diagnosis: Problem not documented

                                        On: 2020 14:15



 

                                        Appointment; ADULT, HEMOPHILIA 

Encounter Diagnosis: Problem not documented

                                        On: 3-Richi-2020 9:00



 

                                        Appointment; ADULT, HEMOPHILIA 

Encounter Diagnosis: Problem not documented

                                        On: 2020 12:00

## 2020-05-24 NOTE — XMS REPORT
Summary of Care

                             Created on: 03/10/2020



WANDA ARITA

External Reference #: [id number]

: 1990

Sex: Female



Demographics





                          Address                   [address] [place]  [address]

 

                          Preferred Language        English

 

                          Marital Status            Unknown

 

                          [employer] Affiliation     Unknown

 

                          Race                      White

 

                          Ethnic Group              Non-





Author





                          Author                    [facility]

 

                          Organization              [facility]

 

                          Address                   [address] [place]  [address]



 

                          Phone                     Unavailable







Care Team Providers





                    Care Team Member Name Role                Phone

 

                    ASHELY CONROY,  JONG Unavailable         Unavailable

 

                    FEROZ ESQUIVEL Unavailable         Unavailable

 

                    ALEJANDRO MARI MD  Unavailable         Unavailable

 

                    Jef Ortega MD Unavailable         Unavailable

 

                    ASHELY BRANDT [place],  JONG Unavailable         Unavailable

 

                    TERESA BRANDT,  PABLO WALKER Unavailable         Unavailable

 

                    KAILEY BRANDT,  JAMEY ARROYO Unavailable         Unavailable

 

                    Mark BRANDT,  Salvador Unavailable         Unavailable

 

                          Unavailable               Unavailable







Functional Status





                    Name                Dates               Details

 

                                        Functional status health issues are not 

documented

                                                    Status: 







                    Name                Dates               Details

 

                                        Cognitive status health issues are not d

ocumented

                                                    Status: 







Problems





                    Name                Dates               Details

 

                                        Need for rubella vaccination (V04.3, Z23

) 

                                                    Status: Active

 

                                        Constipation (564.00, K59.00) 

                                                    Status: Active

 

                                        Morbid obesity (278.01, E66.01) 

                                                    Status: Active

 

                                        General counseling and advice for contra

ceptive management (V25.09, Z30.09) 

                                                    Status: Active

 

                                        Elevated blood pressure reading without 

diagnosis of hypertension (796.2, R03.0)



                                                    Status: Active

 

                                        Morbid obesity with body mass index (BMI

) of 45.0 to 49.9 in adult (278.01, 

E66.01) 

                                                    Status: Active

 

                                        Acid reflux (530.81, K21.9) 

                                                    Status: Active

 

                                        Epistaxis (784.7, R04.0) 

                                                    Status: Active

 

                                        Menorrhagia with irregular cycle (626.2,

 N92.1) 

                                                    Status: Active

 

                                        Type 2B von Willebrand's disease (286.4,

 D68.0) 

                                                    Status: Active

 

                                        Former smoker (V15.82, Z87.891) 

                                                    Status: Active

 

                                        Encounter for screening examination for 

sexually transmitted disease (V74.5, 

Z11.3) 

                                                    Status: Active

 

                                        Encounter for blood test for routine gen

eral physical examination (V72.62, 

Z00.00) 

                                                    Status: Active

 

                                        Encounter for routine gynecological exam

ination with Papanicolaou smear of 

cervix (V72.31, Z01.419) 

                                                    Status: Active

 

                                        Breast mass in female (611.72, N63.0) 

                                                    Status: Active

 

                                        Pelvic pain in female (625.9, R10.2) 

                                                    Status: Active

 

                                        Follow up (V67.9, Z09) 

                                                    Status: Active

 

                                        History of  section, unknown sca

r (V45.89, Z98.891) 

                                                    Status: Active

 

                                        Bacterial vaginosis (616.10, N76.0) 

                                                    Status: Active







Medications





                    Name                Dates               Details

 

                                        Tranexamic Acid 650 MG Oral Tablet

Take 2 tablets po every 8 hours starting on day 1 of menses for a total of 5 
days



 

                                         Quantity: 30









FEROZ ESQUIVEL * 



                                         Start : 18-Sep-2019





Active

metroNIDAZOLE 500 MG Oral Tablet

TAKE ONE TABLET BY MOUTH TWICE A DAY . DO NOT DRINK ALCOHOL

* 



                           Quantity: 14              Refills: 0









JONG LUND M.D. * 



                                         Start : 5-Mar-2020





Active





Allergies and Adverse Reactions





                    Name                Dates               Details

 

                    Codeine Derivatives (Allergy)                     Status: Ac

tive



 

                    Zofran TABS (Allergy)                     Reaction: Rash, It

anival

Status: Active









Past Medical History





                    Name                Dates               Details

 

                                        History of Anemia complicating pregnancy

 (648.20, O99.019) 

                                                    Status: Resolved

 

                                        History of High-risk pregnancy supervisi

on (V23.9, O09.90) 

                                                    Status: Resolved

 

                                        History of hyperemesis gravidarum (V13.2

9, Z87.59) 

                                                    Status: Resolved

 

                                        History of Hypertension complicating pre

gnancy, unspecified trimester (642.90, 

O16.9) 

                                                    Status: Resolved

 

                                        History of MTHFR mutation (V12.3, Z15.89

) 

                                                    Status: Resolved

 

                                        History of nausea (V12.79, Z87.898) 

                                                    Status: Resolved

 

                                        History of Nexplanon insertion (V25.5, Z

30.017) 

                                                    Status: Resolved







Procedures





                    Procedure           Dates               Details

 

                    History of  Section                     Completed 



 

                    History of Tubal Ligation                     Completed 









Immunization





                    Name                Dates               Details

 

                                        Fluzone Quadrivalent 0.5 ML Intramuscula

r Suspension #1

Lot #: [id number]            on: 3-Sep-2014             

 

                                        Tdap (Adacel) #1

Lot #: [id number]            on: 3-Sep-2014             

 

                                        Tdap (Boostrix) #1

Lot #: [id number]            on: 2014            







Family History





                    Name                Dates               Details

 

                                        Family history of essential hypertension

 (V17.49, Z82.49) 

                                                    Status: Active

 

                                        Family history of thyroid disease (V18.1

9, Z83.49) 

                                                    Status: Active

 

                                        Family history of type 2 diabetes [facility] (V18.0, Z83.3) 

                                                    Status: Active







Social History





                    Name                Dates               Details

 

                                        -

                                                    Status: 







                    Name                Dates               Details

 

                    Ex-smoker (finding)                      

 

                    Ex-smoker (finding)                      







Vital Signs





                Date            Test            Result          Details

 

                                                                 

 

                                        [id number]

                    Systolic blood pressure 135 mm[Hg]          Status: Comments

: Location: RUE; Position: 

Sitting



 

                    Diastolic blood pressure 86 mm[Hg]           Status: Comment

s: Location: RUE; Position: 

Sitting



 

                    Body height         70 in               Status: 



 

                    Weight              338 lb              Status: 



 

                    Body mass index (BMI) [Ratio] 48.5 kg/m2          Status: 



 

                    Body surface area Derived from formula 2.61 m2             S

tatus: 



 

                    Body temperature    98.5 f              Status: Comments: Me

thod: Oral



 

                    Heart Rate          88 /min             Status: 









Results





                Date            Description     Value           Details

 

                    Results not documented                      

 

                                                                 







Plan of Care





                    Name                Dates               Details

 

                                        Planned Observations 

 

                    Planned Goals not documented                      

 

                                        Planned Encounters 

 

                                        Appointment; ADULT, HEMOPHILIA

                                        On: 12-Mar-2020 8:00



 

                                        Appointment; ADULT, HEMOPHILIA

                                        On: 3-Jeremy-2020 9:00









Instructions





                    Name                Dates               Details

 

                                        Instructions not documented

                                                     







Encounters





                                        Appointment; PABLO MOORE M.D. 

Encounter Diagnosis: Problem not documented

                                        On: 2018 13:30



 

                                        Appointment; [address] 

Encounter Diagnosis: Problem not documented

                                        On: 2018 14:00



 

                                        Appointment; PABLO MOORE M.D. 

Encounter Diagnosis: Problem not documented

                                        On: 2019 8:00



 

                                        Appointment; SALVADOR LOPEZ M.D . 

Encounter Diagnosis: Problem not documented

                                        On: 2019 9:00



 

                                        Appointment; ADULT, HEMOPHILIA 

Encounter Diagnosis: Problem not documented

                                        On: 18-Sep-2019 9:00



 

                                        Appointment; ADULT, HEMOPHILIA 

Encounter Diagnosis: Problem not documented

                                        On: 4-Dec-2019 9:00



 

                                        Appointment; JONG LUND M.D. 

Encounter Diagnosis: Problem not documented

                                        On: 2020 14:15



 

                                        Appointment; ADULT, HEMOPHILIA 

Encounter Diagnosis: Problem not documented

                                        On: 3-Richi-2020 9:00



 

                                        Appointment; ADULT, HEMOPHILIA 

Encounter Diagnosis: Problem not documented

                                        On: 2020 12:00



 

                                        Appointment; JONG LUND M.D. 

Encounter Diagnosis: Problem not documented

                                        On: 2020 13:30

## 2020-05-24 NOTE — XMS REPORT
Summary of Care: 14 - 14

                             Created on: 2126



WANDA ARITA

External Reference #: [id number]

: 1990

Sex: Female



Demographics





                          Address                   [address] [place] [address]-

 

                          Home Phone                [phone]

 

                          Preferred Language        English

 

                          Marital Status            Single

 

                          Nondenominational Affiliation     Yazdanism

 

                          Race                      Other

 

                          Ethnic Group              /Latin





Author





                          Organization              Unknown

 

                          Address                   Unknown

 

                          Phone                     Unavailable







Encounter





MANDY Alonso(FIN) [id number] Date(s): 14 - 14

[address] [phone]
[phone]

Discharge Diagnosis: Pregnancy

Discharge Disposition: Home

Physician Attending: Amber Vergara MD





Reason for Visit





8 WEEKS PREGNANT/HYPERTENSIVE



Vital Signs





                    1                   2                   3



                                         Most recent to   



                                         oldest [Reference   



                                         Range]:   

 

                                        99.1 DegF 

                          (14 12:02 AM)         99.3 DegF 

*HI*

                          (14 9:47 PM)          97.8 DegF 

                                        (14 7:50 PM)



                                         Temperature Oral   



                                         [96.4-99.1 DegF]   

 

                                        121 mmHg 

                          (14 12:02 AM)         114 mmHg 

                          (14 9:47 PM)          123 mmHg 

                                        (14 7:50 PM)



                                         Systolic Blood   



                                         Pressure [   



                                         mmHg]   

 

                                        68 mmHg 

                          (14 12:02 AM)         66 mmHg 

                          (14 9:47 PM)          71 mmHg 

                                        (14 7:50 PM)



                                         Diastolic Blood   



                                         Pressure [60-90   



                                         mmHg]   

 

                                        18 BRMIN 

                          (14 12:02 AM)         18 BRMIN 

                          (14 9:47 PM)          18 BRMIN 

                                        (14 7:50 PM)



                                         Respiratory Rate   



                                         [14-20 BRMIN]   

 

                                        79 bpm 

                          (14 12:02 AM)         88 bpm 

                          (14 9:47 PM)          84 bpm 

                                        (14 7:50 PM)



                                         Peripheral Pulse   



                                         Rate [ bpm]   







Problem List





    



              Condition    Effective Dates  Status       Health Status  Informan

t

 

    



                           Anemia(Confirmed)         Resolved  







Allergies, Adverse Reactions, Alerts





   



                 Substance       Reaction        Severity        Status

 

   



                           codeine                   Active







Medications





Tylenol

650 mg, Route: PO, Drug form: TAB, ONCE, Dosing Weight 120.909, kg, Priority: ST
AT, Start date: 14 20:57:00, Stop date: 14 20:57:00

Start Date: 14

Stop Date: 14

Status: Completed



Results





ELECTROLYTES



 



                           Most recent to            1



                                         oldest [Reference 



                                         Range]: 

 

 



                           Sodium Lvl [135-145       136 mEq/L



                           mEq/L]                    (14 6:55 PM)

 

 



                           Potassium Lvl             3.7 mEq/L



                           [3.5-5.1 mEq/L]           (14 6:55 PM)

 

 



                           Chloride Lvl [      101 mEq/L



                           mEq/L]                    (14 6:55 PM)

 

 



                           CO2 [24-32 mEq/L]         26 mEq/L



                                         (14 6:55 PM)

 

 



                           AGAP [10.0-20.0           12.7 mEq/L



                           mEq/L]                    (14 6:55 PM)







CHEM PANEL



 



                           Most recent to            1



                                         oldest [Reference 



                                         Range]: 

 

 



                           Creatinine Lvl            0.7 mg/dL



                           [0.5-1.4 mg/dL]           (14 6:55 PM)

 

 



                           eGFR                      121 mL/min/1.73m2 1



                                         *NA*



                                         (14 6:55 PM)

 

 



                           BUN [7-22 mg/dL]          13 mg/dL



                                         (14 6:55 PM)

 

 



                           Glucose Lvl [70-99        91 mg/dL 2



                           mg/dL]                    (14 6:55 PM)

 

 



                           Calcium Lvl               9.7 mg/dL



                           [8.5-10.5 mg/dL]          (14 6:55 PM)







1Result Comment: The eGFR is calculated using the CKD-EPI formula. In most 
young, healthy individuals the eGFR will be >90 mL/min/1.73m2. The eGFR declines
with age. An eGFR of 60-89 may be normal in some populations, particularly the 
elderly, for whom the CKD-EPI formula has not been extensively validated. Use of
the eGFR is not recommended in the following populations:



Individuals with unstable creatinine concentrations, including pregnant patients
and those with serious co-morbid conditions.



Patients with extremes in muscle mass or diet. 



The data above are obtained from the National Kid[employer] Disease Education Program (
NKDEP) which additionally recommends that when the eGFR is used in patients with
extremes of body mass index for purposes of drug dosing, the eGFR should be mul
tiplied by the estimated BMI.



2Interpretive Data: Adult reference range values reflect the clinical guidelines

of the American Diabetes Association.



ENDOCRINOLOGY



 



                           Most recent to            1



                                         oldest [Reference 



                                         Range]: 

 

 



                           hCG Tot                   857040 mIU/mL 3



                                         *NA*



                                         (14 6:55 PM)







3Interpretive Data: Reference Range:

     Male                 0 - 5 mIU/mL

     Non-Pregnant Female  0 - 5 mIU/mL



Note: hCG result should be used in conjunction with symptoms, results

of other tests, and clinical impressions.



Weeks of Gestation        hCG (mIU/mL)

------------------        ----------------

     3                    6 - 71

     4                    

     5                    217 - 7,138

     6                    158 -31,795

     7                    3,697 - 163,563

     8                    32,065 - 149,571

     9                    63,803 - 151,410

     10                   46,506 - 186,977

     11                   27,832 - 210,612

     14                   13,950 - 62,530

     15                   12,039 - 70,971

     16                   9,040 - 56,451

     17                   8,175 - 55,868

     18                   8,099 - 58,176



URINE CHEM



 



                           Most recent to            1



                                         oldest [Reference 



                                         Range]: 

 

 



                           U Preg [Negative]         Positive



                                         *ABN*



                                         (14 4:50 PM)







URINE AND STOOL



 



                           Most recent to            1



                                         oldest [Reference 



                                         Range]: 

 

 



                           UA Turbidity [Clear]      Clear



                                         (14 4:50 PM)

 

 



                           UA Color [Yellow]         Yellow



                                         *NA*



                                         (14 4:50 PM)

 

 



                           UA pH [5.0-8.0]           6.0



                                         (14 4:50 PM)

 

 



                           UA Spec Grav              1.025



                           [<=1.030]                 (14 4:50 PM)

 

 



                           UA Glucose [Negative      Negative mg/dL



                           mg/dL]                    (14 4:50 PM)

 

 



                           UA Blood [Negative]       Negative



                                         (14 4:50 PM)

 

 



                           UA Ketones [Negative      Trace mg/dL



                           mg/dL]                    *ABN*



                                         (14 4:50 PM)

 

 



                           UA Protein [Negative      Negative mg/dL



                           mg/dL]                    (14 4:50 PM)

 

 



                           UA Urobilinogen           0.2 EU/dL



                           [0.1-1.0 EU/dL]           (14 4:50 PM)

 

 



                           UA Bili [Negative]        Negative



                                         *NA*



                                         (14 4:50 PM)

 

 



                           UA Leuk Est               Negative



                           [Negative]                (14 4:50 PM)

 

 



                           UA Nitrite                Negative



                           [Negative]                (14 4:50 PM)

 

 



                           UA WBC [None Seen]        None Seen



                                         (14 4:50 PM)

 

 



                           UA RBC [0-2 /HPF]         0-2 /HPF



                                         (14 4:50 PM)

 

 



                           UA Bacteria [None         Few /HPF



                           Seen /HPF]                (14 4:50 PM)

 

 



                           UA Sq Epi [Few /LPF]      Occasional /LPF



                                         (14 4:50 PM)

 

 



                           Micro?                    Performed



                                         (14 4:50 PM)







HEMATOLOGY



 



                           Most recent to            1



                                         oldest [Reference 



                                         Range]: 

 

 



                           WBC [3.7-10.4 K/CMM]      13.9 K/CMM



                                         *HI*



                                         (14 6:55 PM)

 

 



                           RBC [4.20-5.40            4.04 M/CMM



                           M/CMM]                    *LOW*



                                         (14 6:55 PM)

 

 



                           Hgb [12.0-16.0 g/dL]      12.1 g/dL



                                         (14 6:55 PM)

 

 



                           Hct [36.0-48.0 %]         33.8 %



                                         *LOW*



                                         (14 6:55 PM)

 

 



                           MCV [81.0-99.0 fL]        83.8 fL



                                         (14 6:55 PM)

 

 



                           MCH [27.0-31.0 pg]        29.9 pg



                                         (14 6:55 PM)

 

 



                           MCHC [32.0-36.0           35.7 g/dL



                           g/dL]                     (14 6:55 PM)

 

 



                           RDW [11.5-14.5 %]         13.3 %



                                         (14 6:55 PM)

 

 



                           Platelet [133-450         275 K/CMM



                           K/CMM]                    (14 6:55 PM)

 

 



                           MPV [7.4-10.4 fL]         9.2 fL



                                         (14 6:55 PM)

 

 



                           Segs [45.0-75.0 %]        74.1 %



                                         (14 6:55 PM)

 

 



                           Lymphocytes               18.2 %



                           [20.0-40.0 %]             *LOW*



                                         (14 6:55 PM)

 

 



                           Monocytes [2.0-12.0       4.9 %



                           %]                        (14 6:55 PM)

 

 



                           Eosinophils [0.0-4.0      2.8 %



                           %]                        (14 6:55 PM)

 

 



                           Basophils [0.0-1.0        0.0 %



                           %]                        (14 6:55 PM)

 

 



                           Segs-Bands #              10.3 K/CMM



                           [1.5-8.1 K/CMM]           *HI*



                                         (14 6:55 PM)

 

 



                           Lymphocytes #             2.5 K/CMM



                           [1.0-5.5 K/CMM]           (14 6:55 PM)

 

 



                           Monocytes # [0.0-0.8      0.7 K/CMM



                           K/CMM]                    (14 6:55 PM)

 

 



                           Eosinophils #             0.4 K/CMM



                           [0.0-0.5 K/CMM]           (14 6:55 PM)

 

 



                           Basophils # [0.0-0.2      0.0 K/CMM



                           K/CMM]                    (14 6:55 PM)

 

 



                           RBC Morph                 Normal



                                         (14 6:55 PM)

 

 



                           Plt Morph                 Normal



                                         (14 6:55 PM)







Medications Administered During Your Visit





No data available for this section



Immunizations





No data available for this section



Social History





 



                           Social History Type       Response

 

 



                           Smoking Status            Never smoker, Type: Cigaret

kel, Exposure to Tobacco Smoke None,

Cigarette



                                         Smoking Last 365 Days No, Reg Smoking C

essation Counseling No

## 2020-05-24 NOTE — XMS REPORT
Summary of Care

                             Created on: 2020



WANDA ARITA

External Reference #: [id number]

: 1990

Sex: Female



Demographics





                          Address                   [address] [place]  [address]

 

                          Preferred Language        English

 

                          Marital Status            Unknown

 

                          Baptism Affiliation     Unknown

 

                          Race                      White

 

                          Ethnic Group              Non-





Author





                          Author                    [facility]

 

                          Organization              [facility]

 

                          Address                   [address] [place]  [address]



 

                          Phone                     Unavailable







Care Team Providers





                    Care Team Member Name Role                Phone

 

                    SONIA ANTOINE,  FEROZ Unavailable         Unavailable

 

                    KAMALJIT BRANDT,  ALEJANDRO ARROYO  Unavailable         Unavailable

 

                    Jordan BRANDT,  Jef Unavailable         Unavailable

 

                    ASHELY BRANDT [place],  JONG Unavailable         Unavailable

 

                    TERESA BRANDT,  PABLO WALKER Unavailable         Unavailable

 

                    KALIEY BRANDT,  JAMEY ARROYO Unavailable         Unavailable

 

                    Mark BRANDT,  Salvador Unavailable         Unavailable

 

                          Unavailable               Unavailable







Functional Status





                    Name                Dates               Details

 

                                        Functional status health issues are not 

documented

                                                    Status: 







                    Name                Dates               Details

 

                                        Cognitive status health issues are not d

ocumented

                                                    Status: 







Problems





                    Name                Dates               Details

 

                                        Need for rubella vaccination (V04.3, Z23

) 

                                                    Status: Active

 

                                        Constipation (564.00, K59.00) 

                                                    Status: Active

 

                                        Morbid obesity (278.01, E66.01) 

                                                    Status: Active

 

                                        General counseling and advice for contra

ceptive management (V25.09, Z30.09) 

                                                    Status: Active

 

                                        Elevated blood pressure reading without 

diagnosis of hypertension (796.2, R03.0)



                                                    Status: Active

 

                                        Morbid obesity with body mass index (BMI

) of 45.0 to 49.9 in adult (278.01, 

E66.01) 

                                                    Status: Active

 

                                        Acid reflux (530.81, K21.9) 

                                                    Status: Active

 

                                        Epistaxis (784.7, R04.0) 

                                                    Status: Active

 

                                        Menorrhagia with irregular cycle (626.2,

 N92.1) 

                                                    Status: Active

 

                                        Type 2B von Willebrand's disease (286.4,

 D68.0) 

                                                    Status: Active

 

                                        Former smoker (V15.82, Z87.891) 

                                                    Status: Active

 

                                        Encounter for screening examination for 

sexually transmitted disease (V74.5, 

Z11.3) 

                                                    Status: Active

 

                                        Encounter for blood test for routine gen

eral physical examination (V72.62, 

Z00.00) 

                                                    Status: Active

 

                                        Encounter for routine gynecological exam

ination with Papanicolaou smear of 

cervix (V72.31, Z01.419) 

                                                    Status: Active

 

                                        Breast mass in female (611.72, N63.0) 

                                                    Status: Active

 

                                        Pelvic pain in female (625.9, R10.2) 

                                                    Status: Active







Medications





                    Name                Dates               Details

 

                                        Tranexamic Acid 650 MG Oral Tablet

Take 2 tablets po every 8 hours starting on day 1 of menses for a total of 5 
days



 

                                         Quantity: 30









FEROZ SCOTT N.P. * 



                                         Start : 18-Sep-2019





Active

Iron 100 Plus 100-250-0.025-1 MG Oral Tablet

* 



                                         Refills: 0







* 



                                         Start : 2020





Active





Allergies and Adverse Reactions





                    Name                Dates               Details

 

                    Codeine Derivatives (Allergy)                     Status: Ac

tive



 

                    Zofran TABS (Allergy)                     Reaction: Rash, It

anival

Status: Active









Past Medical History





                    Name                Dates               Details

 

                                        History of Anemia complicating pregnancy

 (648.20, O99.019) 

                                                    Status: Resolved

 

                                        History of High-risk pregnancy supervisi

on (V23.9, O09.90) 

                                                    Status: Resolved

 

                                        History of hyperemesis gravidarum (V13.2

9, Z87.59) 

                                                    Status: Resolved

 

                                        History of Hypertension complicating pre

gnancy, unspecified trimester (642.90, 

O16.9) 

                                                    Status: Resolved

 

                                        History of MTHFR mutation (V12.3, Z15.89

) 

                                                    Status: Resolved

 

                                        History of nausea (V12.79, Z87.898) 

                                                    Status: Resolved

 

                                        History of Nexplanon insertion (V25.5, Z

30.017) 

                                                    Status: Resolved







Procedures





                    Procedure           Dates               Details

 

                    [LH] TSH+Free T4    Date: 2020     

 

                    . [facility] - Affirm VPIII (BV Panel) Date: 2020     

 

                    . [facility] - PAP      Date: 2020     

 

                    [QH] HIV AB, HIV 1/2, EIA, WITH REFLEXES Date: 2020   

  

 

                    US Pelvis with Pelvis Transvaginal 29863 Date: 2020   

  

 

                    MA Digital Mammo DX Charlie  Date: 2020     

 

                    US Breast  Unilat 28372 Date: 2020     

 

                    CT Abdomen/Pelvis w/wo contrast 51358 Date: 2020     

 

                    History of  Section                     Completed 



 

                    History of Tubal Ligation                     Completed 









Immunization





                    Name                Dates               Details

 

                                        Fluzone Quadrivalent 0.5 ML Intramuscula

r Suspension #1

Lot #: [id number]            on: 3-Sep-2014             

 

                                        Tdap (Adacel) #1

Lot #: [id number]            on: 3-Sep-2014             

 

                                        Tdap (Boostrix) #1

Lot #: [id number]            on: 2014            







Family History





                    Name                Dates               Details

 

                                        Family history of essential hypertension

 (V17.49, Z82.49) 

                                                    Status: Active

 

                                        Family history of thyroid disease (V18.1

9, Z83.49) 

                                                    Status: Active

 

                                        Family history of type 2 diabetes [facility] (V18.0, Z83.3) 

                                                    Status: Active







Social History





                    Name                Dates               Details

 

                                        -

                                                    Status: 







                    Name                Dates               Details

 

                    Former smoker                            

 

                    Former smoker                            







Vital Signs





                Date            Test            Result          Details

 

                                                                 

 

                                        :55

                    BP Systolic         137 mm[Hg]          Status: Comments: Lo

cation: RUE; Position: Sitting



 

                    BP Diastolic        86 mm[Hg]           Status: Comments: Lo

cation: RUE; Position: Sitting



 

                    Height              70 in               Status: 



 

                    Weight              333.375 lb          Status: 



 

                    Body Mass Index Calculated 47.83 kg/m2         Status: 



 

                    Body Surface Area Calculated 2.59 m2             Status: 



 

                    Temperature         98.3 f              Status: Comments: Me

thod: Oral



 

                    Heart Rate          76 /min             Status: 









Results





                Date            Description     Value           Details

 

                    :05     [QLH] LIPID PANEL    

 

                                CHOLESTEROL, TOTAL 184  mg/dl (Normal) Range: <2

00





 

                                HDL CHOLESTEROL 47  mg/dl (Below low threshold) 

Range: >50





 

                                TRIGLYCERIDES   155  mg/dl (Above high threshold

) Range: <150





 

                                LDL-CHOLESTEROL 110  {MG/DL__CAL} (Above high th

reshold) Comments: Reference 

range: <100 Desirable range <100 mg/dL for primary prevention;  <70 mg/dL for 
patients with CHD or diabetic patients with > or = 2 CHD risk factors. LDL-C is 
now calculated using the Falguni calculation, which is a validated novel 
method providing better accuracy than the Friedewald equation in the estimation 
of LDL-C. Derek TIRADO et al. DAVE. 2013;310(19): 2557-0415 (http
://education.[employer].com/faq/BRQ364)



 

                                CHOL/HDLC RATIO 3.9  {CALC} (Normal) Range: <5.0





 

                                NON HDL CHOLESTEROL 137  {MG/DL__CAL} (Above hig

h threshold) Range: <130

Comments: For patients with diabetes plus 1 major ASCVD risk factor, treating to
 a non-HDL-C goal of <100 mg/dL (LDL-C of <70 mg/dL) is considered a therapeutic
 option.



 

                          [id number]:05           [Q] HIV-1/2 Antigen and Anti

bodies, Fourth Generation, with 

Reflexes                                 

 

                                HIV AG/AB, 4TH GEN NON-REACTIVE   (Normal) Range

: NON-REACTIVE

Comments: HIV-1 antigen and HIV-1/HIV-2 antibodies were notdetected. There is no
 laboratory evidence of HIVinfection. PLEASE NOTE: This information has been 
disclosed toyou from records whose confidentiality may beprotected by state law.
  If your state requires suchprotection, then the state law prohibits you 
frommaking any further disclosure of the informationwithout the specific written
 consent of the personto whom it pertains, or as otherwise permitted by law.A 
general authorization for the release of medical orother information is NOT 
sufficient for this purpose.  For additional information please refer t
YAZUOp://education.[employer]/faq/ROF488(This link is being provided 
for informational/educational purposes only.)  The performance of this assay has
 not been clinicallyvalidated in patients less than 2 years old.



 

                    [id number]:05     [QLH] CMP W/EGFR     

 

                                GLUCOSE         84  mg/dl (Normal) Range: 65-99

Comments: Fasting reference interval



 

                                UREA NITROGEN (BUN) 12  mg/dl (Normal) Range: 7-

25





 

                                CREATININE      0.68  mg/dl (Normal) Range: 0.50

-1.10





 

                                eGFR NON- 117  {ML/MIN/1.7} (Nor

mal) Range: > OR = 60





 

                                eGFR  135  {ML/MIN/1.7} (Normal)

 Range: > OR = 60





 

                                BUN/CREATININE RATIO NOT APPLICABLE  {CALC} Rang

e: 6-22





 

                                SODIUM          141  mmol/L (Normal) Range: 135-

146





 

                                POTASSIUM       3.9  mmol/L (Normal) Range: 3.5-

5.3





 

                                CHLORIDE        104  mmol/L (Normal) Range: 98-1

10





 

                                CARBON DIOXIDE  25  mmol/L (Normal) Range: 20-32





 

                                CALCIUM         9.3  mg/dl (Normal) Range: 8.6-1

0.2





 

                                PROTEIN, TOTAL  7.6  g/dl (Normal) Range: 6.1-8.

1





 

                                ALBUMIN         3.9  g/dl (Normal) Range: 3.6-5.

1





 

                                GLOBULIN        3.7  {G/DL__CALC} (Normal) Range

: 1.9-3.7





 

                                ALBUMIN/GLOBULIN RATIO 1.1  {CALC} (Normal) Rang

e: 1.0-2.5





 

                                BILIRUBIN, TOTAL 0.7  mg/dl (Normal) Range: 0.2-

1.2





 

                                ALKALINE PHSPHATASE 77  u/l (Normal) Range: 33-1

15





 

                                AST             54  u/l (Above high threshold) R

michaela: 10-30





 

                                ALT             47  u/l (Above high threshold) R

michaela: 6-29





 

                    :05     [QL] CBC (INCLUDES DIFF/PLT)   

 

                                WHITE BLOOD CELL COUNT 8.1  {Thousand/u} (Normal

) Range: 3.8-10.8





 

                                RED BLOOD CELL COUNT 4.66  {Million/uL} (Normal)

 Range: 3.80-5.10





 

                                HEMAGLOBIN      11.9  g/dl (Normal) Range: 11.7-

15.5





 

                                HEMATOCRIT      37.7  % (Normal) Range: 35.0-45.

0





 

                                MCV             80.9  fL (Normal) Range: 80.0-10

0.0





 

                                MCH             25.5  pg (Below low threshold) R

michaela: 27.0-33.0





 

                                MCHC            31.6  g/dl (Below low threshold)

 Range: 32.0-36.0





 

                                RDW             14.5  % (Normal) Range: 11.0-15.

0





 

                                PLATELET COUNT  283  {Thousand/u} (Normal) Range

: 140-400





 

                                MPV             11.4  fL (Normal) Range: 7.5-12.

5





 

                                ABSOLUTE NEUTROPHILS 4957  {cells/uL} (Normal) R

michaela: 3968-4951





 

                                ABSOLUTE LYMPHOCYTES 2252  {cells/uL} (Normal) R

michaela: 850-3900





 

                                ABSOLUTE MONOCYTES 510  {cells/uL} (Normal) Rang

e: 200-950





 

                                ABSOLUTE EOSINOPHILS 332  {cells/uL} (Normal) Ra

nge: 





 

                                ABSOLUTE BASOPHILS 49  {cells/uL} (Normal) Range

: 0-200





 

                                        NEUTROPHILS         61.2  % (Normal)  

 

                                        LYMPHOCYTES         27.8  % (Normal)  

 

                                        MONOCYTES           6.3  % (Normal)  

 

                                        EOSINOPHILS         4.1  % (Normal)  

 

                                        BASOPHILS           0.6  % (Normal)  

 

                    :05     [QH] HEPATITIS B SURFACE ANTIGEN W/REFL 

CONFIRM   

 

                                HEPATITIS B SURFACE ANTIGEN NON-REACTIVE   (Norm

al) Range: NON-REACTIVE





 

                    :05     [QL] HEPATITIS C ANTIBODY   

 

                                HEPATITIS C ANTIBODY NON-REACTIVE   (Normal) Ran

ge: NON-REACTIVE





 

                                SIGNAL TO CUT-OFF 0.01   (Normal) Range: <1.00

Comments: HCV antibody was non-reactive. There is no laboratory evidence of HCV 
infection. In most cases, no further action is required. However,if recent HCV 
exposure is suspected, a test for HCV RNA(test code 17079) is suggested. For 
additional information please refer 
tohttp://[employer]/faq/JGY74q3(This link is being provided 
for informational/educational purposes only.)



 

                    :05     [QL] T4, FREE       

 

                                T4, FREE        1.1  ng/dl (Normal) Range: 0.8-1

.8





 

                    :     [Formerly [facility]] TSH, 3RD GENERATION   

 

                                TSH             1.71  {MIU/L} (Normal) Comments:

 Reference Range                     > or 

= 20 Years  0.40-4.50                          Pregnancy Ranges          First 
trimester    0.26-2.66          Second trimester   0.55-2.73          Third 
trimester    0.43-2.91



 

                    :     [Formerly [facility]] HEMOGLOBIN A1c   

 

                                HEMOGLOBIN A1c  5.9  {%_of_total} (Above high th

reshold) Range: <5.7

Comments: For someone without known diabetes, a hemoglobin A1c value between 
5.7% and 6.4% is consistent withprediabetes and should be confirmed with a 
follow-up test. For someone with known diabetes, a value &lt;7%indicates that 
their diabetes is well controlled. [id number] should be individualized based on 
duration ofdiabetes, age, comorbid conditions, and otherconsiderations. This 
assay result is consistent with an increased riskof diabetes. Currently, no 
consensus exists regarding use ofhemoglobin A1c for diagnosis of diabetes for 
children.



 

                    :05     [Former[facility]] RPR            

 

                                RPR (MONITOR) W/REFL TITER (REFL) NON-REACTIVE  

 (Normal) Range: NON-REACTIVE











Plan of Care





                    Name                Dates               Details

 

                                        Planned Observations 

 

                    Planned Goals not documented                      

 

                                        Planned Encounters 

 

                                        Appointment; ADULT, HEMOPHILIA

                                        On: 2020 12:00



 

                                        Appointment; JONG LUND M.D.

                                        On: 2020 15:00



 

                                        Appointment; ADULT, HEMOPHILIA

                                        On: 3-Jeremy-2020 9:00









Instructions





                    Name                Dates               Details

 

                                        Instructions not documented

                                                     







Encounters





                                        Appointment; PABLO MOORE M.D. 

Encounter Diagnosis: Problem not documented

                                        On: 2018 13:30



 

                                        Appointment; [address] 

Encounter Diagnosis: Problem not documented

                                        On: 2018 14:00



 

                                        Appointment; PABLO MOORE M.D. 

Encounter Diagnosis: Problem not documented

                                        On: 2019 8:00



 

                                        Appointment; SALVADOR LOPEZ M.D . 

Encounter Diagnosis: Problem not documented

                                        On: 2019 9:00



 

                                        Appointment; ADULT, HEMOPHILIA 

Encounter Diagnosis: Problem not documented

                                        On: 18-Sep-2019 9:00



 

                                        Appointment; ADULT, HEMOPHILIA 

Encounter Diagnosis: Problem not documented

                                        On: 4-Dec-2019 9:00



 

                                        Appointment; JONG LUND M.D. 

Encounter Diagnosis: Problem not documented

                                        On: 2020 14:15



 

                                        Appointment; ADULT, HEMOPHILIA 

Encounter Diagnosis: Problem not documented

                                        On: 3-Richi-2020 9:00

## 2020-05-24 NOTE — XMS REPORT
Summary of Care

                             Created on: 2020



WANDA ARITA

External Reference #: [id number]

: 1990

Sex: Female



Demographics





                          Address                   [address] [place]  [address]

 

                          Preferred Language        English

 

                          Marital Status            Unknown

 

                          Muslim Affiliation     Unknown

 

                          Race                      White

 

                          Ethnic Group              Non-





Author





                          Author                    [facility]

 

                          Organization              [facility]

 

                          Address                   [address] [place]  [address]



 

                          Phone                     Unavailable







Care Team Providers





                    Care Team Member Name Role                Phone

 

                    SONIA ANTOINE,  FEROZ Unavailable         Unavailable

 

                    KAMALJIT BRANDT,  ALEJANDRO ARROYO  Unavailable         Unavailable

 

                    Jordan BRANDT,  Jef Unavailable         Unavailable

 

                    ASHELY BRANDT [facility],  JONG Unavailable         Unavailable

 

                    TERESA BRANDT,  PABLO WALKER Unavailable         Unavailable

 

                    KAILEY BRANDT,  JAMEY ARROYO Unavailable         Unavailable

 

                    Mark BRANDT,  Salvador Unavailable         Unavailable

 

                          Unavailable               Unavailable







Functional Status





                    Name                Dates               Details

 

                                        Functional status health issues are not 

documented

                                                    Status: 







                    Name                Dates               Details

 

                                        Cognitive status health issues are not d

ocumented

                                                    Status: 







Problems





                    Name                Dates               Details

 

                                        Need for rubella vaccination (V04.3, Z23

) 

                                                    Status: Active

 

                                        Constipation (564.00, K59.00) 

                                                    Status: Active

 

                                        Morbid obesity (278.01, E66.01) 

                                                    Status: Active

 

                                        General counseling and advice for contra

ceptive management (V25.09, Z30.09) 

                                                    Status: Active

 

                                        Elevated blood pressure reading without 

diagnosis of hypertension (796.2, R03.0)



                                                    Status: Active

 

                                        Morbid obesity with body mass index (BMI

) of 45.0 to 49.9 in adult (278.01, 

E66.01) 

                                                    Status: Active

 

                                        Acid reflux (530.81, K21.9) 

                                                    Status: Active

 

                                        Epistaxis (784.7, R04.0) 

                                                    Status: Active

 

                                        Menorrhagia with irregular cycle (626.2,

 N92.1) 

                                                    Status: Active

 

                                        Type 2B von Willebrand's disease (286.4,

 D68.0) 

                                                    Status: Active

 

                                        Former smoker (V15.82, Z87.891) 

                                                    Status: Active

 

                                        Encounter for screening examination for 

sexually transmitted disease (V74.5, 

Z11.3) 

                                                    Status: Active

 

                                        Encounter for blood test for routine gen

eral physical examination (V72.62, 

Z00.00) 

                                                    Status: Active

 

                                        Encounter for routine gynecological exam

ination with Papanicolaou smear of 

cervix (V72.31, Z01.419) 

                                                    Status: Active

 

                                        Breast mass in female (611.72, N63.0) 

                                                    Status: Active

 

                                        Pelvic pain in female (625.9, R10.2) 

                                                    Status: Active







Medications





                    Name                Dates               Details

 

                                        Tranexamic Acid 650 MG Oral Tablet

Take 2 tablets po every 8 hours starting on day 1 of menses for a total of 5 
days



 

                                         Quantity: 30









FEROZ SCOTT N.P. * 



                                         Start : 18-Sep-2019





Active

Iron 100 Plus 100-250-0.025-1 MG Oral Tablet

* 



                                         Refills: 0







* 



                                         Start : 2020





Active





Allergies and Adverse Reactions





                    Name                Dates               Details

 

                    Codeine Derivatives (Allergy)                     Status: Ac

tive



 

                    Zofran TABS (Allergy)                     Reaction: Rash, It

anival

Status: Active









Past Medical History





                    Name                Dates               Details

 

                                        History of Anemia complicating pregnancy

 (648.20, O99.019) 

                                                    Status: Resolved

 

                                        History of High-risk pregnancy supervisi

on (V23.9, O09.90) 

                                                    Status: Resolved

 

                                        History of hyperemesis gravidarum (V13.2

9, Z87.59) 

                                                    Status: Resolved

 

                                        History of Hypertension complicating pre

gnancy, unspecified trimester (642.90, 

O16.9) 

                                                    Status: Resolved

 

                                        History of MTHFR mutation (V12.3, Z15.89

) 

                                                    Status: Resolved

 

                                        History of nausea (V12.79, Z87.898) 

                                                    Status: Resolved

 

                                        History of Nexplanon insertion (V25.5, Z

30.017) 

                                                    Status: Resolved







Procedures





                    Procedure           Dates               Details

 

                    [LH] TSH+Free T4    Date: 2020     

 

                    . [facility] - Affirm VPIII (BV Panel) Date: 2020     

 

                    . [facility] - PAP      Date: 2020     

 

                    [QH] HIV AB, HIV 1/2, EIA, WITH REFLEXES Date: 2020   

  

 

                    US Pelvis with Pelvis Transvaginal 71987 Date: 2020   

  

 

                    MA Digital Mammo DX Charlie  Date: 2020     

 

                    US Breast  Unilat 58571 Date: 2020     

 

                    CT Abdomen/Pelvis w/wo contrast 59379 Date: 2020     

 

                    History of  Section                     Completed 



 

                    History of Tubal Ligation                     Completed 









Immunization





                    Name                Dates               Details

 

                                        Fluzone Quadrivalent 0.5 ML Intramuscula

r Suspension #1

Lot #: [id number]            on: 3-Sep-2014             

 

                                        Tdap (Adacel) #1

Lot #: [id number]            on: 3-Sep-2014             

 

                                        Tdap (Boostrix) #1

Lot #: [id number]            on: 2014            







Family History





                    Name                Dates               Details

 

                                        Family history of essential hypertension

 (V17.49, Z82.49) 

                                                    Status: Active

 

                                        Family history of thyroid disease (V18.1

9, Z83.49) 

                                                    Status: Active

 

                                        Family history of type 2 diabetes [facility] (V18.0, Z83.3) 

                                                    Status: Active







Social History





                    Name                Dates               Details

 

                                        -

                                                    Status: 







                    Name                Dates               Details

 

                    Former smoker                            

 

                    Former smoker                            







Vital Signs





                Date            Test            Result          Details

 

                                                                 

 

                                        :55

                    BP Systolic         137 mm[Hg]          Status: Comments: Lo

cation: RUE; Position: Sitting



 

                    BP Diastolic        86 mm[Hg]           Status: Comments: Lo

cation: RUE; Position: Sitting



 

                    Height              70 in               Status: 



 

                    Weight              333.375 lb          Status: 



 

                    Body Mass Index Calculated 47.83 kg/m2         Status: 



 

                    Body Surface Area Calculated 2.59 m2             Status: 



 

                    Temperature         98.3 f              Status: Comments: Me

thod: Oral



 

                    Heart Rate          76 /min             Status: 









Results





                Date            Description     Value           Details

 

                    :05     [QLH] LIPID PANEL    

 

                                CHOLESTEROL, TOTAL 184  mg/dl (Normal) Range: <2

00





 

                                HDL CHOLESTEROL 47  mg/dl (Below low threshold) 

Range: >50





 

                                TRIGLYCERIDES   155  mg/dl (Above high threshold

) Range: <150





 

                                LDL-CHOLESTEROL 110  {MG/DL__CAL} (Above high th

reshold) Comments: Reference 

range: <100 Desirable range <100 mg/dL for primary prevention;  <70 mg/dL for 
patients with CHD or diabetic patients with > or = 2 CHD risk factors. LDL-C is 
now calculated using the Falguni calculation, which is a validated novel 
method providing better accuracy than the Friedewald equation in the estimation 
of LDL-C. Derek TIRADO et al. DAVE. 2013;310(19): 4272-3860 (http
://education.[employer].com/faq/OUY005)



 

                                CHOL/HDLC RATIO 3.9  {CALC} (Normal) Range: <5.0





 

                                NON HDL CHOLESTEROL 137  {MG/DL__CAL} (Above hig

h threshold) Range: <130

Comments: For patients with diabetes plus 1 major ASCVD risk factor, treating to
 a non-HDL-C goal of <100 mg/dL (LDL-C of <70 mg/dL) is considered a therapeutic
 option.



 

                          [id number]           [Q] HIV-1/2 Antigen and Anti

bodies, Fourth Generation, with 

Reflexes                                 

 

                                HIV AG/AB, 4TH GEN NON-REACTIVE   (Normal) Range

: NON-REACTIVE

Comments: HIV-1 antigen and HIV-1/HIV-2 antibodies were notdetected. There is no
 laboratory evidence of HIVinfection. PLEASE NOTE: This information has been 
disclosed toyou from records whose confidentiality may beprotected by state law.
  If your state requires suchprotection, then the state law prohibits you 
frommaking any further disclosure of the informationwithout the specific written
 consent of the personto whom it pertains, or as otherwise permitted by law.A 
general authorization for the release of medical orother information is NOT 
sufficient for this purpose.  For additional information please refer t
SimpleOrderp://education.XIHA/faq/KJU420(This link is being provided 
for informational/educational purposes only.)  The performance of this assay has
 not been clinicallyvalidated in patients less than 2 years old.



 

                    [id number]     [QLH] CMP W/EGFR     

 

                                GLUCOSE         84  mg/dl (Normal) Range: 65-99

Comments: Fasting reference interval



 

                                UREA NITROGEN (BUN) 12  mg/dl (Normal) Range: 7-

25





 

                                CREATININE      0.68  mg/dl (Normal) Range: 0.50

-1.10





 

                                eGFR NON- 117  {ML/MIN/1.7} (Nor

mal) Range: > OR = 60





 

                                eGFR  135  {ML/MIN/1.7} (Normal)

 Range: > OR = 60





 

                                BUN/CREATININE RATIO NOT APPLICABLE  {CALC} Rang

e: 6-22





 

                                SODIUM          141  mmol/L (Normal) Range: 135-

146





 

                                POTASSIUM       3.9  mmol/L (Normal) Range: 3.5-

5.3





 

                                CHLORIDE        104  mmol/L (Normal) Range: 98-1

10





 

                                CARBON DIOXIDE  25  mmol/L (Normal) Range: 20-32





 

                                CALCIUM         9.3  mg/dl (Normal) Range: 8.6-1

0.2





 

                                PROTEIN, TOTAL  7.6  g/dl (Normal) Range: 6.1-8.

1





 

                                ALBUMIN         3.9  g/dl (Normal) Range: 3.6-5.

1





 

                                GLOBULIN        3.7  {G/DL__CALC} (Normal) Range

: 1.9-3.7





 

                                ALBUMIN/GLOBULIN RATIO 1.1  {CALC} (Normal) Rang

e: 1.0-2.5





 

                                BILIRUBIN, TOTAL 0.7  mg/dl (Normal) Range: 0.2-

1.2





 

                                ALKALINE PHSPHATASE 77  u/l (Normal) Range: 33-1

15





 

                                AST             54  u/l (Above high threshold) R

michaela: 10-30





 

                                ALT             47  u/l (Above high threshold) R

michaela: 6-29





 

                    :05     [QL] CBC (INCLUDES DIFF/PLT)   

 

                                WHITE BLOOD CELL COUNT 8.1  {Thousand/u} (Normal

) Range: 3.8-10.8





 

                                RED BLOOD CELL COUNT 4.66  {Million/uL} (Normal)

 Range: 3.80-5.10





 

                                HEMAGLOBIN      11.9  g/dl (Normal) Range: 11.7-

15.5





 

                                HEMATOCRIT      37.7  % (Normal) Range: 35.0-45.

0





 

                                MCV             80.9  fL (Normal) Range: 80.0-10

0.0





 

                                MCH             25.5  pg (Below low threshold) R

michaela: 27.0-33.0





 

                                MCHC            31.6  g/dl (Below low threshold)

 Range: 32.0-36.0





 

                                RDW             14.5  % (Normal) Range: 11.0-15.

0





 

                                PLATELET COUNT  283  {Thousand/u} (Normal) Range

: 140-400





 

                                MPV             11.4  fL (Normal) Range: 7.5-12.

5





 

                                ABSOLUTE NEUTROPHILS 4957  {cells/uL} (Normal) R

michaela: 5917-9912





 

                                ABSOLUTE LYMPHOCYTES 2252  {cells/uL} (Normal) R

michaela: 850-3900





 

                                ABSOLUTE MONOCYTES 510  {cells/uL} (Normal) Rang

e: 200-950





 

                                ABSOLUTE EOSINOPHILS 332  {cells/uL} (Normal) Ra

nge: 





 

                                ABSOLUTE BASOPHILS 49  {cells/uL} (Normal) Range

: 0-200





 

                                        NEUTROPHILS         61.2  % (Normal)  

 

                                        LYMPHOCYTES         27.8  % (Normal)  

 

                                        MONOCYTES           6.3  % (Normal)  

 

                                        EOSINOPHILS         4.1  % (Normal)  

 

                                        BASOPHILS           0.6  % (Normal)  

 

                    :05     [QH] HEPATITIS B SURFACE ANTIGEN W/REFL 

CONFIRM   

 

                                HEPATITIS B SURFACE ANTIGEN NON-REACTIVE   (Norm

al) Range: NON-REACTIVE





 

                    :05     [QL] HEPATITIS C ANTIBODY   

 

                                HEPATITIS C ANTIBODY NON-REACTIVE   (Normal) Ran

ge: NON-REACTIVE





 

                                SIGNAL TO CUT-OFF 0.01   (Normal) Range: <1.00

Comments: HCV antibody was non-reactive. There is no laboratory evidence of HCV 
infection. In most cases, no further action is required. However,if recent HCV 
exposure is suspected, a test for HCV RNA(test code 65540) is suggested. For 
additional information please refer 
tohttp://[employer].XIHA/faq/GIJ85m3(This link is being provided 
for informational/educational purposes only.)



 

                    :05     [QL] T4, FREE       

 

                                T4, FREE        1.1  ng/dl (Normal) Range: 0.8-1

.8





 

                    :     [[facility]] TSH, 3RD GENERATION   

 

                                TSH             1.71  {MIU/L} (Normal) Comments:

 Reference Range                     > or 

= 20 Years  0.40-4.50                          Pregnancy Ranges          First 
trimester    0.26-2.66          Second trimester   0.55-2.73          Third 
trimester    0.43-2.91



 

                    :     [[facility]] HEMOGLOBIN A1c   

 

                                HEMOGLOBIN A1c  5.9  {%_of_total} (Above high th

reshold) Range: <5.7

Comments: For someone without known diabetes, a hemoglobin A1c value between 
5.7% and 6.4% is consistent withprediabetes and should be confirmed with a 
follow-up test. For someone with known diabetes, a value &lt;7%indicates that 
their diabetes is well controlled. [id number] should be individualized based on 
duration ofdiabetes, age, comorbid conditions, and otherconsiderations. This 
assay result is consistent with an increased riskof diabetes. Currently, no 
consensus exists regarding use ofhemoglobin A1c for diagnosis of diabetes for 
children.



 

                    :05     [[facility]] RPR            

 

                                RPR (MONITOR) W/REFL TITER (REFL) NON-REACTIVE  

 (Normal) Range: NON-REACTIVE











Plan of Care





                    Name                Dates               Details

 

                                        Planned Observations 

 

                    Planned Goals not documented                      

 

                                        Planned Encounters 

 

                                        Appointment; ADULT, HEMOPHILIA

                                        On: 2020 12:00



 

                                        Appointment; JONG LUND M.D.

                                        On: 2020 15:00



 

                                        Appointment; ADULT, HEMOPHILIA

                                        On: 3-Jeremy-2020 9:00









Instructions





                    Name                Dates               Details

 

                                        Instructions not documented

                                                     







Encounters





                                        Appointment; PABLO MOORE M.D. 

Encounter Diagnosis: Problem not documented

                                        On: 2018 13:30



 

                                        Appointment; [address] 

Encounter Diagnosis: Problem not documented

                                        On: 2018 14:00



 

                                        Appointment; PABLO MOORE M.D. 

Encounter Diagnosis: Problem not documented

                                        On: 2019 8:00



 

                                        Appointment; SALVADOR LOPEZ M.D . 

Encounter Diagnosis: Problem not documented

                                        On: 2019 9:00



 

                                        Appointment; ADULT, HEMOPHILIA 

Encounter Diagnosis: Problem not documented

                                        On: 18-Sep-2019 9:00



 

                                        Appointment; ADULT, HEMOPHILIA 

Encounter Diagnosis: Problem not documented

                                        On: 4-Dec-2019 9:00



 

                                        Appointment; JONG LUND M.D. 

Encounter Diagnosis: Problem not documented

                                        On: 2020 14:15



 

                                        Appointment; ADULT, HEMOPHILIA 

Encounter Diagnosis: Problem not documented

                                        On: 3-Richi-2020 9:00

## 2020-05-24 NOTE — XMS REPORT
Summary of Care

                             Created on: 2020



WANDA ARITA

External Reference #: [id number]

: 1990

Sex: Female



Demographics





                          Address                   [address] [place]  [address]

 

                          Preferred Language        English

 

                          Marital Status            Unknown

 

                          Yazdanism Affiliation     Unknown

 

                          Race                      White

 

                          Ethnic Group              Non-





Author





                          Author                    [facility]

 

                          Organization              [facility]

 

                          Address                   [address] [place]  [address]



 

                          Phone                     Unavailable







Care Team Providers





                    Care Team Member Name Role                Phone

 

                    FEROZ ESQUIVEL Unavailable         Unavailable

 

                    KAMALJIT BRANDT,  ALEJANDRO ARROYO  Unavailable         Unavailable

 

                    Jef Ortega MD Unavailable         Unavailable

 

                    ASHELY BRANDT [place],  JONG Unavailable         Unavailable

 

                    TERESA BRANDT,  PABLO WALKER Unavailable         Unavailable

 

                    KAILEY BRANDT,  JAMEY ARROYO Unavailable         Unavailable

 

                    Mark BRANDT,  Salvador Unavailable         Unavailable

 

                          Unavailable               Unavailable







Functional Status





                    Name                Dates               Details

 

                                        Functional status health issues are not 

documented

                                                    Status: 







                    Name                Dates               Details

 

                                        Cognitive status health issues are not d

ocumented

                                                    Status: 







Problems





                    Name                Dates               Details

 

                                        Need for rubella vaccination (V04.3, Z23

) 

                                                    Status: Active

 

                                        Constipation (564.00, K59.00) 

                                                    Status: Active

 

                                        Morbid obesity (278.01, E66.01) 

                                                    Status: Active

 

                                        General counseling and advice for contra

ceptive management (V25.09, Z30.09) 

                                                    Status: Active

 

                                        Elevated blood pressure reading without 

diagnosis of hypertension (796.2, R03.0)



                                                    Status: Active

 

                                        Morbid obesity with body mass index (BMI

) of 45.0 to 49.9 in adult (278.01, 

E66.01) 

                                                    Status: Active

 

                                        Acid reflux (530.81, K21.9) 

                                                    Status: Active

 

                                        Epistaxis (784.7, R04.0) 

                                                    Status: Active

 

                                        Menorrhagia with irregular cycle (626.2,

 N92.1) 

                                                    Status: Active

 

                                        Type 2B von Willebrand's disease (286.4,

 D68.0) 

                                                    Status: Active

 

                                        Former smoker (V15.82, Z87.891) 

                                                    Status: Active

 

                                        Encounter for screening examination for 

sexually transmitted disease (V74.5, 

Z11.3) 

                                                    Status: Active

 

                                        Encounter for blood test for routine gen

eral physical examination (V72.62, 

Z00.00) 

                                                    Status: Active

 

                                        Encounter for routine gynecological exam

ination with Papanicolaou smear of 

cervix (V72.31, Z01.419) 

                                                    Status: Active

 

                                        Breast mass in female (611.72, N63.0) 

                                                    Status: Active

 

                                        Pelvic pain in female (625.9, R10.2) 

                                                    Status: Active

 

                                        Follow up (V67.9, Z09) 

                                                    Status: Active

 

                                        History of  section, unknown sca

r (V45.89, Z98.891) 

                                                    Status: Active







Medications





                    Name                Dates               Details

 

                                        Tranexamic Acid 650 MG Oral Tablet

Take 2 tablets po every 8 hours starting on day 1 of menses for a total of 5 
days



 

                                         Quantity: 30









FEROZ ESQUIVEL * 



                                         Start : 18-Sep-2019





Active





Allergies and Adverse Reactions





                    Name                Dates               Details

 

                    Codeine Derivatives (Allergy)                     Status: Ac

tive



 

                    Zofran TABS (Allergy)                     Reaction: Rash, It

anival

Status: Active









Past Medical History





                    Name                Dates               Details

 

                                        History of Anemia complicating pregnancy

 (648.20, O99.019) 

                                                    Status: Resolved

 

                                        History of High-risk pregnancy supervisi

on (V23.9, O09.90) 

                                                    Status: Resolved

 

                                        History of hyperemesis gravidarum (V13.2

9, Z87.59) 

                                                    Status: Resolved

 

                                        History of Hypertension complicating pre

gnancy, unspecified trimester (642.90, 

O16.9) 

                                                    Status: Resolved

 

                                        History of MTHFR mutation (V12.3, Z15.89

) 

                                                    Status: Resolved

 

                                        History of nausea (V12.79, Z87.898) 

                                                    Status: Resolved

 

                                        History of Nexplanon insertion (V25.5, Z

30.017) 

                                                    Status: Resolved







Procedures





                    Procedure           Dates               Details

 

                    History of  Section                     Completed 



 

                    History of Tubal Ligation                     Completed 









Immunization





                    Name                Dates               Details

 

                                        Fluzone Quadrivalent 0.5 ML Intramuscula

r Suspension #1

Lot #: [id number]            on: 3-Sep-2014             

 

                                        Tdap (Adacel) #1

Lot #: [id number]            on: 3-Sep-2014             

 

                                        Tdap (Boostrix) #1

Lot #: [id number]            on: 2014            







Family History





                    Name                Dates               Details

 

                                        Family history of essential hypertension

 (V17.49, Z82.49) 

                                                    Status: Active

 

                                        Family history of thyroid disease (V18.1

9, Z83.49) 

                                                    Status: Active

 

                                        Family history of type 2 diabetes [facility] (V18.0, Z83.3) 

                                                    Status: Active







Social History





                    Name                Dates               Details

 

                                        -

                                                    Status: 







                    Name                Dates               Details

 

                    Ex-smoker (finding)                      

 

                    Ex-smoker (finding)                      







Vital Signs





                Date            Test            Result          Details

 

                                                                 

 

                                        [id number]

                    Systolic blood pressure 135 mm[Hg]          Status: Comments

: Location: RUE; Position: 

Sitting



 

                    Diastolic blood pressure 86 mm[Hg]           Status: Comment

s: Location: RUE; Position: 

Sitting



 

                    Body height         70 in               Status: 



 

                    Weight              338 lb              Status: 



 

                    Body mass index (BMI) [Ratio] 48.5 kg/m2          Status: 



 

                    Body surface area Derived from formula 2.61 m2             S

tatus: 



 

                    Body temperature    98.5 f              Status: Comments: Me

thod: Oral



 

                    Heart Rate          88 /min             Status: 









Results





                Date            Description     Value           Details

 

                    Results not documented                      

 

                                                                 







Plan of Care





                    Name                Dates               Details

 

                                        Planned Observations 

 

                    Planned Goals not documented                      

 

                                        Planned Encounters 

 

                                        Appointment; ÁLVARO MOSS M.D.

                                        On: 9-Mar-2020 10:00



 

                                        Appointment; ADULT, HEMOP[facility]ILIA

                                        On: 3-Jeremy-2020 9:00









Instructions





                    Name                Dates               Details

 

                                        Instructions not documented

                                                     







Encounters





                                        Appointment; PABLO MOORE M.D. 

Encounter Diagnosis: Problem not documented

                                        On: 2018 13:30



 

                                        Appointment; [address] 

Encounter Diagnosis: Problem not documented

                                        On: 2018 14:00



 

                                        Appointment; PABLO MOORE M.D. 

Encounter Diagnosis: Problem not documented

                                        On: 2019 8:00



 

                                        Appointment; SALVADOR LOPEZ M.D . 

Encounter Diagnosis: Problem not documented

                                        On: 2019 9:00



 

                                        Appointment; ADULT, HEMOPHILIA 

Encounter Diagnosis: Problem not documented

                                        On: 18-Sep-2019 9:00



 

                                        Appointment; ADULT, HEM[facility]ILIA 

Encounter Diagnosis: Problem not documented

                                        On: 4-Dec-2019 9:00



 

                                        Appointment; JONG LUND M.D. 

Encounter Diagnosis: Problem not documented

                                        On: 2020 14:15



 

                                        Appointment; ADULT, HEMOPHILIA 

Encounter Diagnosis: Problem not documented

                                        On: 3-Richi-2020 9:00



 

                                        Appointment; ADULT, HEMOPHILIA 

Encounter Diagnosis: Problem not documented

                                        On: 2020 12:00



 

                                        Appointment; JONG LUND M.D. 

Encounter Diagnosis: Problem not documented

                                        On: 2020 13:30

## 2020-05-24 NOTE — EMERGENCY DEPARTMENT NOTE
History of Present Illnes


History of Present Illness


Chief Complaint:  left hand pain s/p punching a wall


History of Present Illness


This is a 29 year old  female.  was doing well until 3 days ago then 

left hand pain s/p punching wall out of anger


Historian:  Patient


Arrival Mode:  Car


History limited by:  condition of the patient (normal)


 Required:  No


Onset (how long ago):  day(s) (3)


Location:  left hand


Quality:  sharp


Radiation:  non-radiation


Severity:  moderate


Onset quality:  sudden


Duration (how long):  day(s) (3)


Timing of current episode:  constant


Progression:  unchanged


Context:  recent illness, recent surgery, recent immobilization, recent travel; 

trauma/injury (puched a wall); new medications, hx of DVT/PE, non-compliance w/ 

medications


Relieving factors:  none


Exacerbating factors:  movement


Associated symptoms:  denies other symptoms


Treatments prior to arrival:  none


Risk factors:  none





Past Medical/Family History


Physician Review


I have reviewed the patient's past medical and family history.  Any updates have

been documented here.





Past Medical History


Recent Fever:  No


Clinical Suspicion of Infectio:  No


New/Unexplained Change in Ment:  No


Other Medical History:  


vonwille brand  type 2B


Past Surgical History:  Tubal Ligation





Social History


Smoking Cessation:  Never Smoker


Counseling Performed:  No


Alcohol Use:  None


Any Illegal Drug Use:  No


TB Exposure/Symptoms:  No


Physically hurt or threatened:  No





Other


Last Tetanus:  unk


Any Pre-Existing Lines (PICC,:  No


Is patient up to date on immun:  Yes


Last Flu:  2019


Last Pneumovax:  none





Review of Systems


Review of Systems


Constitutional:  no symptoms


EENTM:  no symptoms


Cardiovascular:  no symptoms


Respiratory:  no symptoms


Gastrointestinal:  no symptoms


Genitourinary:  no symptoms


Musculoskeletal:  no symptoms, as per HPI, joint swelling


Neurological:  no symptoms


Psychological:  no symptoms


Endocrine:  no symptoms


Hematological/Lymphatic:  no symptoms


Review of other systems


All other systems reviewed and negative.





Physical Exam


Related Data


Allergies:  


Coded Allergies:  


     codeine (Verified  Allergy, Unknown, 5/24/20)


     ondansetron (Verified  Allergy, Unknown, 5/24/20)


Triage Vital Signs





Vital Signs








  Date Time  Temp Pulse Resp B/P (MAP) Pulse Ox O2 Delivery O2 Flow Rate FiO2


 


5/24/20 11:45 97.8 74 18 173/90 99   











Physical Exam


CONSTITUTIONAL





Constitutional:  well-developed, well-nourished


HENT


HENT:  normocephalic, atraumatic, oropharynx clear/moist, nose normal


HENT L/R:  left ext ear normal, right ext ear normal


EYES





Eyes:  PERRL, conjunctivae normal


NECK


Neck:  ROM normal


PULMONARY


Pulmonary:  effort normal, breath sounds normal


CARDIOVASCULAR





Cardiovascular:  regular rhythm, heart sounds normal, capillary refill normal, 

normal rate


GASTROINTESTINAL





Abdominal:  soft, nontender, bowel sounds normal


GENITOURINARY





Genitourinary:  exam deferred


SKIN


Skin:  warm, dry


MUSCULOSKELETAL





Musculoskeletal:  deformity (medial left hand), tenderness (left hand), swelling

(left hand)


NEUROLOGICAL





Neurological:  alert, oriented x 3, no gross motor or sensory deficits


PSYCHOLOGICAL


Psychological:  mood/affect normal, judgement normal





Results


Imaging


Imaging results reviewed:  Yes (5th metacarpal fx)





Critical Care Time


Subsequent provider


I assumed direction of critical care for this patient from another provider of 

my specialty.





Assessment & Plan


Assessment & Plan


Final Impression:  


(1) Fracture of fifth metacarpal bone of left hand


Assessment & Plan


wear ulnar gutter splint, rx for ibuprofen,  f/u with dr cabrera-ortho


Depart Disposition:  HOME, SELF-CARE


Last Vital Signs











  Date Time  Temp Pulse Resp B/P (MAP) Pulse Ox O2 Delivery O2 Flow Rate FiO2


 


5/24/20 11:45 97.8 74 18 173/90 99   








Home Meds


Active Scripts


Ibuprofen (MOTRIN) 800 Mg Tab, 800 MG PO Q6H PRN for MODERATE PAIN (4-6) for 7 

Days, #30 TAB


   Prov:ALLYSON CHRISTOPHER         5/24/20











ALLYSON CHRISTOPHER              May 24, 2020 12:56

## 2020-05-24 NOTE — XMS REPORT
Summary of Care

                             Created on: 2020



WANDA ARITA

External Reference #: [id number]

: 1990

Sex: Female



Demographics





                          Address                   [address] [place]  [address]

 

                          Preferred Language        English

 

                          Marital Status            Unknown

 

                          Faith Affiliation     Unknown

 

                          Race                      White

 

                          Ethnic Group              Non-





Author





                          Author                    [facility]

 

                          Organization              [facility]

 

                          Address                   [address] [place]  [address]



 

                          Phone                     Unavailable







Care Team Providers





                    Care Team Member Name Role                Phone

 

                    SONIA ANTOINE,  FEROZ Unavailable         Unavailable

 

                    KAMALJIT BRANDT,  ALEJANDRO ARROYO  Unavailable         Unavailable

 

                    Jordan BRANDT,  Jef Unavailable         Unavailable

 

                    ASHELY BRANDT [place],  JONG Unavailable         Unavailable

 

                    TERESA BRANDT,  PABLO WALKER Unavailable         Unavailable

 

                    KAILEY BRANDT,  JAMEY ARROYO Unavailable         Unavailable

 

                    Mark BRANDT,  Salvador Unavailable         Unavailable

 

                          Unavailable               Unavailable







Functional Status





                    Name                Dates               Details

 

                                        Functional status health issues are not 

documented

                                                    Status: 







                    Name                Dates               Details

 

                                        Cognitive status health issues are not d

ocumented

                                                    Status: 







Problems





                    Name                Dates               Details

 

                                        Need for rubella vaccination (V04.3, Z23

) 

                                                    Status: Active

 

                                        Constipation (564.00, K59.00) 

                                                    Status: Active

 

                                        Morbid obesity (278.01, E66.01) 

                                                    Status: Active

 

                                        General counseling and advice for contra

ceptive management (V25.09, Z30.09) 

                                                    Status: Active

 

                                        Elevated blood pressure reading without 

diagnosis of hypertension (796.2, R03.0)



                                                    Status: Active

 

                                        Morbid obesity with body mass index (BMI

) of 45.0 to 49.9 in adult (278.01, 

E66.01) 

                                                    Status: Active

 

                                        Acid reflux (530.81, K21.9) 

                                                    Status: Active

 

                                        Epistaxis (784.7, R04.0) 

                                                    Status: Active

 

                                        Menorrhagia with irregular cycle (626.2,

 N92.1) 

                                                    Status: Active

 

                                        Type 2B von Willebrand's disease (286.4,

 D68.0) 

                                                    Status: Active

 

                                        Former smoker (V15.82, Z87.891) 

                                                    Status: Active

 

                                        Encounter for screening examination for 

sexually transmitted disease (V74.5, 

Z11.3) 

                                                    Status: Active

 

                                        Encounter for blood test for routine gen

eral physical examination (V72.62, 

Z00.00) 

                                                    Status: Active

 

                                        Encounter for routine gynecological exam

ination with Papanicolaou smear of 

cervix (V72.31, Z01.419) 

                                                    Status: Active

 

                                        Breast mass in female (611.72, N63.0) 

                                                    Status: Active

 

                                        Pelvic pain in female (625.9, R10.2) 

                                                    Status: Active







Medications





                    Name                Dates               Details

 

                                        Tranexamic Acid 650 MG Oral Tablet

Take 2 tablets po every 8 hours starting on day 1 of menses for a total of 5 
days



 

                                         Quantity: 30









FEROZ SCOTT N.P. * 



                                         Start : 18-Sep-2019





Active

Iron 100 Plus 100-250-0.025-1 MG Oral Tablet

* 



                                         Refills: 0







* 



                                         Start : 2020





Active





Allergies and Adverse Reactions





                    Name                Dates               Details

 

                    Codeine Derivatives (Allergy)                     Status: Ac

tive



 

                    Zofran TABS (Allergy)                     Reaction: Rash, It

anival

Status: Active









Past Medical History





                    Name                Dates               Details

 

                                        History of Anemia complicating pregnancy

 (648.20, O99.019) 

                                                    Status: Resolved

 

                                        History of High-risk pregnancy supervisi

on (V23.9, O09.90) 

                                                    Status: Resolved

 

                                        History of hyperemesis gravidarum (V13.2

9, Z87.59) 

                                                    Status: Resolved

 

                                        History of Hypertension complicating pre

gnancy, unspecified trimester (642.90, 

O16.9) 

                                                    Status: Resolved

 

                                        History of MTHFR mutation (V12.3, Z15.89

) 

                                                    Status: Resolved

 

                                        History of nausea (V12.79, Z87.898) 

                                                    Status: Resolved

 

                                        History of Nexplanon insertion (V25.5, Z

30.017) 

                                                    Status: Resolved







Procedures





                    Procedure           Dates               Details

 

                    [LH] TSH+Free T4    Date: 2020     

 

                    . [facility] - Affirm VPIII (BV Panel) Date: 2020     

 

                    . [facility] - PAP      Date: 2020     

 

                    [QH] HIV AB, HIV 1/2, EIA, WITH REFLEXES Date: 2020   

  

 

                    US Pelvis with Pelvis Transvaginal 22558 Date: 2020   

  

 

                    MA Digital Mammo DX Charlie  Date: 2020     

 

                    US Breast  Unilat 15292 Date: 2020     

 

                    CT Abdomen/Pelvis w/wo contrast 53734 Date: 2020     

 

                    History of  Section                     Completed 



 

                    History of Tubal Ligation                     Completed 









Immunization





                    Name                Dates               Details

 

                                        Fluzone Quadrivalent 0.5 ML Intramuscula

r Suspension #1

Lot #: [id number]            on: 3-Sep-2014             

 

                                        Tdap (Adacel) #1

Lot #: [id number]            on: 3-Sep-2014             

 

                                        Tdap (Boostrix) #1

Lot #: [id number]            on: 2014            







Family History





                    Name                Dates               Details

 

                                        Family history of essential hypertension

 (V17.49, Z82.49) 

                                                    Status: Active

 

                                        Family history of thyroid disease (V18.1

9, Z83.49) 

                                                    Status: Active

 

                                        Family history of type 2 diabetes [facility] (V18.0, Z83.3) 

                                                    Status: Active







Social History





                    Name                Dates               Details

 

                                        -

                                                    Status: 







                    Name                Dates               Details

 

                    Former smoker                            

 

                    Former smoker                            







Vital Signs





                Date            Test            Result          Details

 

                                                                 

 

                                        :55

                    BP Systolic         137 mm[Hg]          Status: Comments: Lo

cation: RUE; Position: Sitting



 

                    BP Diastolic        86 mm[Hg]           Status: Comments: Lo

cation: RUE; Position: Sitting



 

                    Height              70 in               Status: 



 

                    Weight              333.375 lb          Status: 



 

                    Body Mass Index Calculated 47.83 kg/m2         Status: 



 

                    Body Surface Area Calculated 2.59 m2             Status: 



 

                    Temperature         98.3 f              Status: Comments: Me

thod: Oral



 

                    Heart Rate          76 /min             Status: 









Results





                Date            Description     Value           Details

 

                    :05     [QLH] LIPID PANEL    

 

                                CHOLESTEROL, TOTAL 184  mg/dl (Normal) Range: <2

00





 

                                HDL CHOLESTEROL 47  mg/dl (Below low threshold) 

Range: >50





 

                                TRIGLYCERIDES   155  mg/dl (Above high threshold

) Range: <150





 

                                LDL-CHOLESTEROL 110  {MG/DL__CAL} (Above high th

reshold) Comments: Reference 

range: <100 Desirable range <100 mg/dL for primary prevention;  <70 mg/dL for 
patients with CHD or diabetic patients with > or = 2 CHD risk factors. LDL-C is 
now calculated using the Falguni calculation, which is a validated novel 
method providing better accuracy than the Friedewald equation in the estimation 
of LDL-C. Derek TIRADO et al. DAVE. 2013;310(19): 3391-4871 (http
://education.[employer]Kick.com/faq/DKH225)



 

                                CHOL/HDLC RATIO 3.9  {CALC} (Normal) Range: <5.0





 

                                NON HDL CHOLESTEROL 137  {MG/DL__CAL} (Above hig

h threshold) Range: <130

Comments: For patients with diabetes plus 1 major ASCVD risk factor, treating to
 a non-HDL-C goal of <100 mg/dL (LDL-C of <70 mg/dL) is considered a therapeutic
 option.



 

                          [id number]           [Q] HIV-1/2 Antigen and Anti

bodies, Fourth Generation, with 

Reflexes                                 

 

                                HIV AG/AB, 4TH GEN NON-REACTIVE   (Normal) Range

: NON-REACTIVE

Comments: HIV-1 antigen and HIV-1/HIV-2 antibodies were notdetected. There is no
 laboratory evidence of HIVinfection. PLEASE NOTE: This information has been 
disclosed toyou from records whose confidentiality may beprotected by state law.
  If your state requires suchprotection, then the state law prohibits you 
frommaking any further disclosure of the informationwithout the specific written
 consent of the personto whom it pertains, or as otherwise permitted by law.A 
general authorization for the release of medical orother information is NOT 
sufficient for this purpose.  For additional information please refer t
walkbyp://education.[employer]/faq/EDZ666(This link is being provided 
for informational/educational purposes only.)  The performance of this assay has
 not been clinicallyvalidated in patients less than 2 years old.



 

                    [id number]     [QLH] CMP W/EGFR     

 

                                GLUCOSE         84  mg/dl (Normal) Range: 65-99

Comments: Fasting reference interval



 

                                UREA NITROGEN (BUN) 12  mg/dl (Normal) Range: 7-

25





 

                                CREATININE      0.68  mg/dl (Normal) Range: 0.50

-1.10





 

                                eGFR NON- 117  {ML/MIN/1.7} (Nor

mal) Range: > OR = 60





 

                                eGFR  135  {ML/MIN/1.7} (Normal)

 Range: > OR = 60





 

                                BUN/CREATININE RATIO NOT APPLICABLE  {CALC} Rang

e: 6-22





 

                                SODIUM          141  mmol/L (Normal) Range: 135-

146





 

                                POTASSIUM       3.9  mmol/L (Normal) Range: 3.5-

5.3





 

                                CHLORIDE        104  mmol/L (Normal) Range: 98-1

10





 

                                CARBON DIOXIDE  25  mmol/L (Normal) Range: 20-32





 

                                CALCIUM         9.3  mg/dl (Normal) Range: 8.6-1

0.2





 

                                PROTEIN, TOTAL  7.6  g/dl (Normal) Range: 6.1-8.

1





 

                                ALBUMIN         3.9  g/dl (Normal) Range: 3.6-5.

1





 

                                GLOBULIN        3.7  {G/DL__CALC} (Normal) Range

: 1.9-3.7





 

                                ALBUMIN/GLOBULIN RATIO 1.1  {CALC} (Normal) Rang

e: 1.0-2.5





 

                                BILIRUBIN, TOTAL 0.7  mg/dl (Normal) Range: 0.2-

1.2





 

                                ALKALINE PHSPHATASE 77  u/l (Normal) Range: 33-1

15





 

                                AST             54  u/l (Above high threshold) R

michaela: 10-30





 

                                ALT             47  u/l (Above high threshold) R

michaela: 6-29





 

                    :05     [QL] CBC (INCLUDES DIFF/PLT)   

 

                                WHITE BLOOD CELL COUNT 8.1  {Thousand/u} (Normal

) Range: 3.8-10.8





 

                                RED BLOOD CELL COUNT 4.66  {Million/uL} (Normal)

 Range: 3.80-5.10





 

                                HEMAGLOBIN      11.9  g/dl (Normal) Range: 11.7-

15.5





 

                                HEMATOCRIT      37.7  % (Normal) Range: 35.0-45.

0





 

                                MCV             80.9  fL (Normal) Range: 80.0-10

0.0





 

                                MCH             25.5  pg (Below low threshold) R

michaela: 27.0-33.0





 

                                MCHC            31.6  g/dl (Below low threshold)

 Range: 32.0-36.0





 

                                RDW             14.5  % (Normal) Range: 11.0-15.

0





 

                                PLATELET COUNT  283  {Thousand/u} (Normal) Range

: 140-400





 

                                MPV             11.4  fL (Normal) Range: 7.5-12.

5





 

                                ABSOLUTE NEUTROPHILS 4957  {cells/uL} (Normal) R

michaela: 1324-3608





 

                                ABSOLUTE LYMPHOCYTES 2252  {cells/uL} (Normal) R

michaela: 850-3900





 

                                ABSOLUTE MONOCYTES 510  {cells/uL} (Normal) Rang

e: 200-950





 

                                ABSOLUTE EOSINOPHILS 332  {cells/uL} (Normal) Ra

nge: 





 

                                ABSOLUTE BASOPHILS 49  {cells/uL} (Normal) Range

: 0-200





 

                                        NEUTROPHILS         61.2  % (Normal)  

 

                                        LYMPHOCYTES         27.8  % (Normal)  

 

                                        MONOCYTES           6.3  % (Normal)  

 

                                        EOSINOPHILS         4.1  % (Normal)  

 

                                        BASOPHILS           0.6  % (Normal)  

 

                    :05     [QH] HEPATITIS B SURFACE ANTIGEN W/REFL 

CONFIRM   

 

                                HEPATITIS B SURFACE ANTIGEN NON-REACTIVE   (Norm

al) Range: NON-REACTIVE





 

                    :05     [QL] HEPATITIS C ANTIBODY   

 

                                HEPATITIS C ANTIBODY NON-REACTIVE   (Normal) Ran

ge: NON-REACTIVE





 

                                SIGNAL TO CUT-OFF 0.01   (Normal) Range: <1.00

Comments: HCV antibody was non-reactive. There is no laboratory evidence of HCV 
infection. In most cases, no further action is required. However,if recent HCV 
exposure is suspected, a test for HCV RNA(test code 11167) is suggested. For 
additional information please refer 
tohttp://geolad.[employer]/faq/KBZ10x9(This link is being provided 
for informational/educational purposes only.)



 

                    :05     [QL] T4, FREE       

 

                                T4, FREE        1.1  ng/dl (Normal) Range: 0.8-1

.8





 

                    :     [[facility]] TSH, 3RD GENERATION   

 

                                TSH             1.71  {MIU/L} (Normal) Comments:

 Reference Range                     > or 

= 20 Years  0.40-4.50                          Pregnancy Ranges          First 
trimester    0.26-2.66          Second trimester   0.55-2.73          Third 
trimester    0.43-2.91



 

                    :     [[facility]] HEMOGLOBIN A1c   

 

                                HEMOGLOBIN A1c  5.9  {%_of_total} (Above high th

reshold) Range: <5.7

Comments: For someone without known diabetes, a hemoglobin A1c value between 
5.7% and 6.4% is consistent withprediabetes and should be confirmed with a 
follow-up test. For someone with known diabetes, a value &lt;7%indicates that 
their diabetes is well controlled. [id number] should be individualized based on 
duration ofdiabetes, age, comorbid conditions, and otherconsiderations. This 
assay result is consistent with an increased riskof diabetes. Currently, no 
consensus exists regarding use ofhemoglobin A1c for diagnosis of diabetes for 
children.



 

                    :05     [[facility]] RPR            

 

                                RPR (MONITOR) W/REFL TITER (REFL) NON-REACTIVE  

 (Normal) Range: NON-REACTIVE











Plan of Care





                    Name                Dates               Details

 

                                        Planned Observations 

 

                    Planned Goals not documented                      

 

                                        Planned Encounters 

 

                                        Appointment; ADULT, HEMOPHILIA

                                        On: 2020 12:00



 

                                        Appointment; JONG LUND M.D.

                                        On: 2020 15:00



 

                                        Appointment; ADULT, HEMOPHILIA

                                        On: 3-Jeremy-2020 9:00









Instructions





                    Name                Dates               Details

 

                                        Instructions not documented

                                                     







Encounters





                                        Appointment; PABLO MOORE M.D. 

Encounter Diagnosis: Problem not documented

                                        On: 2018 13:30



 

                                        Appointment; [address] 

Encounter Diagnosis: Problem not documented

                                        On: 2018 14:00



 

                                        Appointment; PABLO MOORE M.D. 

Encounter Diagnosis: Problem not documented

                                        On: 2019 8:00



 

                                        Appointment; SALVADOR LOPEZ M.D . 

Encounter Diagnosis: Problem not documented

                                        On: 2019 9:00



 

                                        Appointment; ADULT, HEMOPHILIA 

Encounter Diagnosis: Problem not documented

                                        On: 18-Sep-2019 9:00



 

                                        Appointment; ADULT, HEMOPHILIA 

Encounter Diagnosis: Problem not documented

                                        On: 4-Dec-2019 9:00



 

                                        Appointment; JONG LUND M.D. 

Encounter Diagnosis: Problem not documented

                                        On: 2020 14:15



 

                                        Appointment; ADULT, HEMOPHILIA 

Encounter Diagnosis: Problem not documented

                                        On: 3-Richi-2020 9:00

## 2020-05-24 NOTE — XMS REPORT
Summary of Care

                             Created on: 2020



WANDA ARITA

External Reference #: [id number]

: 1990

Sex: Female



Demographics





                          Address                   [address] [place]  [address]

 

                          Preferred Language        English

 

                          Marital Status            Unknown

 

                          Religion Affiliation     Unknown

 

                          Race                      White

 

                          Ethnic Group              Non-





Author





                          Author                    [facility]

 

                          Organization              [facility]

 

                          Address                   [address] [place]  [address]



 

                          Phone                     Unavailable







Care Team Providers





                    Care Team Member Name Role                Phone

 

                    SONIA ANTOINE,  FEROZ Unavailable         Unavailable

 

                    KAMALJIT BRANDT,  ALEJANDRO ARROYO  Unavailable         Unavailable

 

                    Jordan BRANDT,  Jef Unavailable         Unavailable

 

                    ASHELY BRANDT [place],  JONG Unavailable         Unavailable

 

                    TERESA BRANDT,  PABLO WALKER Unavailable         Unavailable

 

                    KAILEY BRANDT,  JAMEY ARROYO Unavailable         Unavailable

 

                    aMrk BRANDT,  Salvador Unavailable         Unavailable

 

                          Unavailable               Unavailable







Functional Status





                    Name                Dates               Details

 

                                        Functional status health issues are not 

documented

                                                    Status: 







                    Name                Dates               Details

 

                                        Cognitive status health issues are not d

ocumented

                                                    Status: 







Problems





                    Name                Dates               Details

 

                                        Need for rubella vaccination (V04.3, Z23

) 

                                                    Status: Active

 

                                        Constipation (564.00, K59.00) 

                                                    Status: Active

 

                                        Morbid obesity (278.01, E66.01) 

                                                    Status: Active

 

                                        General counseling and advice for contra

ceptive management (V25.09, Z30.09) 

                                                    Status: Active

 

                                        Elevated blood pressure reading without 

diagnosis of hypertension (796.2, R03.0)



                                                    Status: Active

 

                                        Morbid obesity with body mass index (BMI

) of 45.0 to 49.9 in adult (278.01, 

E66.01) 

                                                    Status: Active

 

                                        Acid reflux (530.81, K21.9) 

                                                    Status: Active

 

                                        Epistaxis (784.7, R04.0) 

                                                    Status: Active

 

                                        Menorrhagia with irregular cycle (626.2,

 N92.1) 

                                                    Status: Active

 

                                        Type 2B von Willebrand's disease (286.4,

 D68.0) 

                                                    Status: Active

 

                                        Former smoker (V15.82, Z87.891) 

                                                    Status: Active

 

                                        Encounter for screening examination for 

sexually transmitted disease (V74.5, 

Z11.3) 

                                                    Status: Active

 

                                        Encounter for blood test for routine gen

eral physical examination (V72.62, 

Z00.00) 

                                                    Status: Active

 

                                        Encounter for routine gynecological exam

ination with Papanicolaou smear of 

cervix (V72.31, Z01.419) 

                                                    Status: Active

 

                                        Breast mass in female (611.72, N63.0) 

                                                    Status: Active

 

                                        Pelvic pain in female (625.9, R10.2) 

                                                    Status: Active







Medications





                    Name                Dates               Details

 

                                        Tranexamic Acid 650 MG Oral Tablet

Take 2 tablets po every 8 hours starting on day 1 of menses for a total of 5 
days



 

                                         Quantity: 30









FEROZ SCOTT N.P. * 



                                         Start : 18-Sep-2019





Active

Iron 100 Plus 100-250-0.025-1 MG Oral Tablet

* 



                                         Refills: 0







* 



                                         Start : 2020





Active





Allergies and Adverse Reactions





                    Name                Dates               Details

 

                    Codeine Derivatives (Allergy)                     Status: Ac

tive



 

                    Zofran TABS (Allergy)                     Reaction: Rash, It

anival

Status: Active









Past Medical History





                    Name                Dates               Details

 

                                        History of Anemia complicating pregnancy

 (648.20, O99.019) 

                                                    Status: Resolved

 

                                        History of High-risk pregnancy supervisi

on (V23.9, O09.90) 

                                                    Status: Resolved

 

                                        History of hyperemesis gravidarum (V13.2

9, Z87.59) 

                                                    Status: Resolved

 

                                        History of Hypertension complicating pre

gnancy, unspecified trimester (642.90, 

O16.9) 

                                                    Status: Resolved

 

                                        History of MTHFR mutation (V12.3, Z15.89

) 

                                                    Status: Resolved

 

                                        History of nausea (V12.79, Z87.898) 

                                                    Status: Resolved

 

                                        History of Nexplanon insertion (V25.5, Z

30.017) 

                                                    Status: Resolved







Procedures





                    Procedure           Dates               Details

 

                    [LH] TSH+Free T4    Date: 2020     

 

                    . [facility] - Affirm VPIII (BV Panel) Date: 2020     

 

                    . [facility] - PAP      Date: 2020     

 

                    [QH] HIV AB, HIV 1/2, EIA, WITH REFLEXES Date: 2020   

  

 

                    US Pelvis with Pelvis Transvaginal 31964 Date: 2020   

  

 

                    MA Digital Mammo DX Charlie  Date: 2020     

 

                    US Breast  Unilat 76834 Date: 2020     

 

                    CT Abdomen/Pelvis w/wo contrast 69211 Date: 2020     

 

                    History of  Section                     Completed 



 

                    History of Tubal Ligation                     Completed 









Immunization





                    Name                Dates               Details

 

                                        Fluzone Quadrivalent 0.5 ML Intramuscula

r Suspension #1

Lot #: [id number]            on: 3-Sep-2014             

 

                                        Tdap (Adacel) #1

Lot #: [id number]            on: 3-Sep-2014             

 

                                        Tdap (Boostrix) #1

Lot #: [id number]            on: 2014            







Family History





                    Name                Dates               Details

 

                                        Family history of essential hypertension

 (V17.49, Z82.49) 

                                                    Status: Active

 

                                        Family history of thyroid disease (V18.1

9, Z83.49) 

                                                    Status: Active

 

                                        Family history of type 2 diabetes [facility] (V18.0, Z83.3) 

                                                    Status: Active







Social History





                    Name                Dates               Details

 

                                        -

                                                    Status: 







                    Name                Dates               Details

 

                    Former smoker                            

 

                    Former smoker                            







Vital Signs





                Date            Test            Result          Details

 

                                                                 

 

                                        :55

                    BP Systolic         137 mm[Hg]          Status: Comments: Lo

cation: RUE; Position: Sitting



 

                    BP Diastolic        86 mm[Hg]           Status: Comments: Lo

cation: RUE; Position: Sitting



 

                    Height              70 in               Status: 



 

                    Weight              333.375 lb          Status: 



 

                    Body Mass Index Calculated 47.83 kg/m2         Status: 



 

                    Body Surface Area Calculated 2.59 m2             Status: 



 

                    Temperature         98.3 f              Status: Comments: Me

thod: Oral



 

                    Heart Rate          76 /min             Status: 









Results





                Date            Description     Value           Details

 

                    :05     [QL[facility]] LIPID PANEL    

 

                                CHOLESTEROL, TOTAL 184  mg/dl (Normal) Range: <2

00





 

                                HDL CHOLESTEROL 47  mg/dl (Below low threshold) 

Range: >50





 

                                TRIGLYCERIDES   155  mg/dl (Above high threshold

) Range: <150





 

                                LDL-CHOLESTEROL 110  {MG/DL__CAL} (Above high th

reshold) Comments: Reference 

range: <100 Desirable range <100 mg/dL for primary prevention;  <70 mg/dL for 
patients with CHD or diabetic patients with > or = 2 CHD risk factors. LDL-C is 
now calculated using the Falguni calculation, which is a validated novel 
method providing better accuracy than the Friedewald equation in the estimation 
of LDL-C. Derek TIRADO et al. DAVE. 2013;310(19): 5506-9993 (http
://education.[employer].com/faq/FVK482)



 

                                CHOL/HDLC RATIO 3.9  {CALC} (Normal) Range: <5.0





 

                                NON HDL CHOLESTEROL 137  {MG/DL__CAL} (Above hig

h threshold) Range: <130

Comments: For patients with diabetes plus 1 major ASCVD risk factor, treating to
 a non-HDL-C goal of <100 mg/dL (LDL-C of <70 mg/dL) is considered a therapeutic
 option.



 

                          [id number]           [Q] HIV-1/2 Antigen and Anti

bodies, Fourth Generation, with 

Reflexes                                 

 

                                HIV AG/AB, 4TH GEN NON-REACTIVE   (Normal) Range

: NON-REACTIVE

Comments: HIV-1 antigen and HIV-1/HIV-2 antibodies were notdetected. There is no
 laboratory evidence of HIVinfection. PLEASE NOTE: This information has been 
disclosed toyou from records whose confidentiality may beprotected by state law.
  If your state requires suchprotection, then the state law prohibits you 
frommaking any further disclosure of the informationwithout the specific written
 consent of the personto whom it pertains, or as otherwise permitted by law.A 
general authorization for the release of medical orother information is NOT 
sufficient for this purpose.  For additional information please refer [email or web address]
[email or web address]“ticap://education.[employer]/faq/LYZ874(This link is being provided 
for informational/educational purposes only.)  The performance of this assay has
 not been clinicallyvalidated in patients less than 2 years old.



 

                    [id number]     [QLH] CMP W/EGFR     

 

                                GLUCOSE         84  mg/dl (Normal) Range: 65-99

Comments: Fasting reference interval



 

                                UREA NITROGEN (BUN) 12  mg/dl (Normal) Range: 7-

25





 

                                CREATININE      0.68  mg/dl (Normal) Range: 0.50

-1.10





 

                                eGFR NON- 117  {ML/MIN/1.7} (Nor

mal) Range: > OR = 60





 

                                eGFR  135  {ML/MIN/1.7} (Normal)

 Range: > OR = 60





 

                                BUN/CREATININE RATIO NOT APPLICABLE  {CALC} Rang

e: 6-22





 

                                SODIUM          141  mmol/L (Normal) Range: 135-

146





 

                                POTASSIUM       3.9  mmol/L (Normal) Range: 3.5-

5.3





 

                                CHLORIDE        104  mmol/L (Normal) Range: 98-1

10





 

                                CARBON DIOXIDE  25  mmol/L (Normal) Range: 20-32





 

                                CALCIUM         9.3  mg/dl (Normal) Range: 8.6-1

0.2





 

                                PROTEIN, TOTAL  7.6  g/dl (Normal) Range: 6.1-8.

1





 

                                ALBUMIN         3.9  g/dl (Normal) Range: 3.6-5.

1





 

                                GLOBULIN        3.7  {G/DL__CALC} (Normal) Range

: 1.9-3.7





 

                                ALBUMIN/GLOBULIN RATIO 1.1  {CALC} (Normal) Rang

e: 1.0-2.5





 

                                BILIRUBIN, TOTAL 0.7  mg/dl (Normal) Range: 0.2-

1.2





 

                                ALKALINE PHSPHATASE 77  u/l (Normal) Range: 33-1

15





 

                                AST             54  u/l (Above high threshold) R

michaela: 10-30





 

                                ALT             47  u/l (Above high threshold) R

michaela: 6-29





 

                    :05     [QL] CBC (INCLUDES DIFF/PLT)   

 

                                WHITE BLOOD CELL COUNT 8.1  {Thousand/u} (Normal

) Range: 3.8-10.8





 

                                RED BLOOD CELL COUNT 4.66  {Million/uL} (Normal)

 Range: 3.80-5.10





 

                                HEMAGLOBIN      11.9  g/dl (Normal) Range: 11.7-

15.5





 

                                HEMATOCRIT      37.7  % (Normal) Range: 35.0-45.

0





 

                                MCV             80.9  fL (Normal) Range: 80.0-10

0.0





 

                                MCH             25.5  pg (Below low threshold) R

michaela: 27.0-33.0





 

                                MCHC            31.6  g/dl (Below low threshold)

 Range: 32.0-36.0





 

                                RDW             14.5  % (Normal) Range: 11.0-15.

0





 

                                PLATELET COUNT  283  {Thousand/u} (Normal) Range

: 140-400





 

                                MPV             11.4  fL (Normal) Range: 7.5-12.

5





 

                                ABSOLUTE NEUTROPHILS 4957  {cells/uL} (Normal) R

michaela: 6913-2256





 

                                ABSOLUTE LYMPHOCYTES 2252  {cells/uL} (Normal) R

michaela: 850-3900





 

                                ABSOLUTE MONOCYTES 510  {cells/uL} (Normal) Rang

e: 200-950





 

                                ABSOLUTE EOSINOPHILS 332  {cells/uL} (Normal) Ra

nge: 





 

                                ABSOLUTE BASOPHILS 49  {cells/uL} (Normal) Range

: 0-200





 

                                        NEUTROPHILS         61.2  % (Normal)  

 

                                        LYMPHOCYTES         27.8  % (Normal)  

 

                                        MONOCYTES           6.3  % (Normal)  

 

                                        EOSINOPHILS         4.1  % (Normal)  

 

                                        BASOPHILS           0.6  % (Normal)  

 

                    :05     [QH] HEPATITIS B SURFACE ANTIGEN W/REFL 

CONFIRM   

 

                                HEPATITIS B SURFACE ANTIGEN NON-REACTIVE   (Norm

al) Range: NON-REACTIVE





 

                    :05     [QL] HEPATITIS C ANTIBODY   

 

                                HEPATITIS C ANTIBODY NON-REACTIVE   (Normal) Ran

ge: NON-REACTIVE





 

                                SIGNAL TO CUT-OFF 0.01   (Normal) Range: <1.00

Comments: HCV antibody was non-reactive. There is no laboratory evidence of HCV 
infection. In most cases, no further action is required. However,if recent HCV 
exposure is suspected, a test for HCV RNA(test code 85992) is suggested. For 
additional information please refer 
tohttp://[employer]/faq/KRS25x2(This link is being provided 
for informational/educational purposes only.)



 

                    :05     [[facility]] T4, FREE       

 

                                T4, FREE        1.1  ng/dl (Normal) Range: 0.8-1

.8





 

                    :     [[facility]] TSH, 3RD GENERATION   

 

                                TSH             1.71  {MIU/L} (Normal) Comments:

 Reference Range                     > or 

= 20 Years  0.40-4.50                          Pregnancy Ranges          First 
trimester    0.26-2.66          Second trimester   0.55-2.73          Third 
trimester    0.43-2.91



 

                    :     [[facility]] HEMOGLOBIN A1c   

 

                                HEMOGLOBIN A1c  5.9  {%_of_total} (Above high th

reshold) Range: <5.7

Comments: For someone without known diabetes, a hemoglobin A1c value between 
5.7% and 6.4% is consistent withprediabetes and should be confirmed with a 
follow-up test. For someone with known diabetes, a value &lt;7%indicates that 
their diabetes is well controlled. [id number] should be individualized based on 
duration ofdiabetes, age, comorbid conditions, and otherconsiderations. This 
assay result is consistent with an increased riskof diabetes. Currently, no 
consensus exists regarding use ofhemoglobin A1c for diagnosis of diabetes for 
children.



 

                    :05     [[facility]] RPR            

 

                                RPR (MONITOR) W/REFL TITER (REFL) NON-REACTIVE  

 (Normal) Range: NON-REACTIVE











Plan of Care





                    Name                Dates               Details

 

                                        Planned Observations 

 

                    Planned Goals not documented                      

 

                                        Planned Encounters 

 

                                        Appointment; JONG LUND M.D.

                                        On: 2020 15:00



 

                                        Appointment; ADULT, HEMOPHILIA

                                        On: 3-Jeremy-2020 9:00









Instructions





                    Name                Dates               Details

 

                                        Instructions not documented

                                                     







Encounters





                                        Appointment; PABLO MOORE M.D. 

Encounter Diagnosis: Problem not documented

                                        On: 2018 13:30



 

                                        Appointment; [address] 

Encounter Diagnosis: Problem not documented

                                        On: 2018 14:00



 

                                        Appointment; PABLO MOORE M.D. 

Encounter Diagnosis: Problem not documented

                                        On: 2019 8:00



 

                                        Appointment; SALVADOR LOPEZ M.D . 

Encounter Diagnosis: Problem not documented

                                        On: 2019 9:00



 

                                        Appointment; ADULT, HEMOPHILIA 

Encounter Diagnosis: Problem not documented

                                        On: 18-Sep-2019 9:00



 

                                        Appointment; ADULT, HEMOPHILIA 

Encounter Diagnosis: Problem not documented

                                        On: 4-Dec-2019 9:00



 

                                        Appointment; JONG LUND M.D. 

Encounter Diagnosis: Problem not documented

                                        On: 2020 14:15



 

                                        Appointment; ADULT, HEMOPHILIA 

Encounter Diagnosis: Problem not documented

                                        On: 3-Richi-2020 9:00

## 2020-05-24 NOTE — XMS REPORT
Summary of Care

                             Created on: 2020



WANDA ARITA

External Reference #: [id number]

: 1990

Sex: Female



Demographics





                          Address                   [address] [place] [address]

 

                          Preferred Language        English

 

                          Marital Status            Unknown

 

                          [employer] Affiliation     Unknown

 

                          Race                      White

 

                          Ethnic Group              Non-





Author





                          Author                    SONIA ANTOINE, WANDA REDMAN

 

                          Organization              Unknown

 

                          Address                   Unknown

 

                          Phone                     Unavailable







Care Team Providers





                    Care Team Member Name Role                Phone

 

                    FEROZ SCOTT N.P. Unavailable         Unavailable

 

                    KAMALJIT BRANDT,  ALEJANDRO ARROYO  Unavailable         Unavailable

 

                    Jordan BRANDT,  Jef Unavailable         Unavailable

 

                    ASHELY BRANDT [place],  JONG Unavailable         Unavailable

 

                    TERESA BRANDT,  PABLO WALKER Unavailable         Unavailable

 

                    KAILEY BRANDT,  JAMEY ARROYO Unavailable         Unavailable

 

                    Mark BRANDT,  Salvador Unavailable         Unavailable

 

                          Unavailable               Unavailable







Functional Status





                    Name                Dates               Details

 

                                        Functional status health issues are not 

documented

                                                    Status: 







                    Name                Dates               Details

 

                                        Cognitive status health issues are not d

ocumented

                                                    Status: 







Problems





                    Name                Dates               Details

 

                                        Need for rubella vaccination (V04.3, Z23

) 

                                                    Status: Active

 

                                        Constipation (564.00, K59.00) 

                                                    Status: Active

 

                                        Morbid obesity (278.01, E66.01) 

                                                    Status: Active

 

                                        General counseling and advice for contra

ceptive management (V25.09, Z30.09) 

                                                    Status: Active

 

                                        Elevated blood pressure reading without 

diagnosis of hypertension (796.2, R03.0)



                                                    Status: Active

 

                                        Morbid obesity with body mass index (BMI

) of 45.0 to 49.9 in adult (278.01, 

E66.01) 

                                                    Status: Active

 

                                        Acid reflux (530.81, K21.9) 

                                                    Status: Active

 

                                        Epistaxis (784.7, R04.0) 

                                                    Status: Active

 

                                        Menorrhagia with irregular cycle (626.2,

 N92.1) 

                                                    Status: Active

 

                                        Type 2B von Willebrand's disease (286.4,

 D68.0) 

                                                    Status: Active

 

                                        Former smoker (V15.82, Z87.891) 

                                                    Status: Active

 

                                        Encounter for screening examination for 

sexually transmitted disease (V74.5, 

Z11.3) 

                                                    Status: Active

 

                                        Encounter for blood test for routine gen

eral physical examination (V72.62, 

Z00.00) 

                                                    Status: Active

 

                                        Encounter for routine gynecological exam

ination with Papanicolaou smear of 

cervix (V72.31, Z01.419) 

                                                    Status: Active

 

                                        Breast mass in female (611.72, N63.0) 

                                                    Status: Active

 

                                        Pelvic pain in female (625.9, R10.2) 

                                                    Status: Active







Medications





                    Name                Dates               Details

 

                                        Tranexamic Acid 650 MG Oral Tablet

Take 2 tablets po every 8 hours starting on day 1 of menses for a total of 5 
days



 

                                         Quantity: 30









FREOZ SCOTT N.P. * 



                                         Start : 18-Sep-2019





Active

Iron 100 Plus 100-250-0.025-1 MG Oral Tablet

* 



                                         Refills: 0







* 



                                         Start : 2020





Active





Allergies and Adverse Reactions





                    Name                Dates               Details

 

                    Codeine Derivatives (Allergy)                     Status: Ac

tive



 

                    Zofran TABS (Allergy)                     Reaction: Rash, It

anival

Status: Active









Past Medical History





                    Name                Dates               Details

 

                                        History of Anemia complicating pregnancy

 (648.20, O99.019) 

                                                    Status: Resolved

 

                                        History of High-risk pregnancy supervisi

on (V23.9, O09.90) 

                                                    Status: Resolved

 

                                        History of hyperemesis gravidarum (V13.2

9, Z87.59) 

                                                    Status: Resolved

 

                                        History of Hypertension complicating pre

gnancy, unspecified trimester (642.90, 

O16.9) 

                                                    Status: Resolved

 

                                        History of MTHFR mutation (V12.3, Z15.89

) 

                                                    Status: Resolved

 

                                        History of nausea (V12.79, Z87.898) 

                                                    Status: Resolved

 

                                        History of Nexplanon insertion (V25.5, Z

30.017) 

                                                    Status: Resolved







Procedures





                    Procedure           Dates               Details

 

                    [LH] TSH+Free T4    Date: 2020     

 

                    . [facility] - Affirm VPIII (BV Panel) Date: 2020     

 

                    . [facility] - PAP      Date: 2020     

 

                    [QH] HIV AB, HIV 1/2, EIA, WITH REFLEXES Date: 2020   

  

 

                    US Pelvis with Pelvis Transvaginal 51710 Date: 2020   

  

 

                    MA Digital Mammo DX Charlie  Date: 2020     

 

                    US Breast  Unilat 83386 Date: 2020     

 

                    CT Abdomen/Pelvis w/wo contrast 79920 Date: 2020     

 

                    History of  Section                     Completed 



 

                    History of Tubal Ligation                     Completed 









Immunization





                    Name                Dates               Details

 

                                        Fluzone Quadrivalent 0.5 ML Intramuscula

r Suspension #1

Lot #: [id number]            on: 3-Sep-2014             

 

                                        Tdap (Adacel) #1

Lot #: [id number]            on: 3-Sep-2014             

 

                                        Tdap (Boostrix) #1

Lot #: [id number]            on: 2014            







Family History





                    Name                Dates               Details

 

                                        Family history of essential hypertension

 (V17.49, Z82.49) 

                                                    Status: Active

 

                                        Family history of thyroid disease (V18.1

9, Z83.49) 

                                                    Status: Active

 

                                        Family history of type 2 diabetes [facility] (V18.0, Z83.3) 

                                                    Status: Active







Social History





                    Name                Dates               Details

 

                                        -

                                                    Status: 







                    Name                Dates               Details

 

                    Former smoker                            

 

                    Former smoker                            







Vital Signs





                Date            Test            Result          Details

 

                                                                 

 

                    No Known Vitals to report                      







Results





                Date            Description     Value           Details

 

                    Results not documented                      

 

                                                                 







Plan of Care





                    Name                Dates               Details

 

                                        Planned Observations 

 

                    Planned Goals not documented                      

 

                                        Planned Encounters 

 

                                        Appointment; JONG LUND M.D.

                                        On: 2020 13:30



 

                                        Appointment; ADULT, HEMOPHILIA

                                        On: 3-Jeremy-2020 9:00









Interventions Provided

Medication Changes* Tranexamic Acid 650 MG Oral Tablet - Renew





Instructions





                    Name                Dates               Details

 

                                        Instructions not documented

                                                     







Encounters





                                        Appointment; PABLO MOORE M.D. 

Encounter Diagnosis: Problem not documented

                                        On: 2018 13:30



 

                                        Appointment; [address] 

Encounter Diagnosis: Problem not documented

                                        On: 2018 14:00



 

                                        Appointment; PABLO MOORE M.D. 

Encounter Diagnosis: Problem not documented

                                        On: 2019 8:00



 

                                        Appointment; SALVADOR LOPEZ M.D . 

Encounter Diagnosis: Problem not documented

                                        On: 2019 9:00



 

                                        Appointment; ADULT, HEMOPHILIA 

Encounter Diagnosis: Problem not documented

                                        On: 18-Sep-2019 9:00



 

                                        Appointment; ADULT, HEMOPHILIA 

Encounter Diagnosis: Problem not documented

                                        On: 4-Dec-2019 9:00



 

                                        Appointment; JONG LUND M.D. 

Encounter Diagnosis: Problem not documented

                                        On: 2020 14:15



 

                                        Appointment; ADULT, HEMOPHILIA 

Encounter Diagnosis: Problem not documented

                                        On: 3-Richi-2020 9:00



 

                                        Appointment; ADULT, HEMOPHILIA 

Encounter Diagnosis: Problem not documented

                                        On: 2020 12:00

## 2020-05-24 NOTE — XMS REPORT
Summary of Care

                             Created on: 2020



WANDA ARITA

External Reference #: [id number]

: 1990

Sex: Female



Demographics





                          Address                   [address] [place]  [address]

 

                          Preferred Language        English

 

                          Marital Status            Unknown

 

                          Jain Affiliation     Unknown

 

                          Race                      White

 

                          Ethnic Group              Non-





Author





                          Author                    [facility]

 

                          Organization              [facility]

 

                          Address                   [address] [place]  [address]



 

                          Phone                     Unavailable







Care Team Providers





                    Care Team Member Name Role                Phone

 

                    SONIA ANTOINE,  FEROZ Unavailable         Unavailable

 

                    KAMALJIT BRANDT,  ALEJANDRO ARROYO  Unavailable         Unavailable

 

                    Jordan BRANDT,  Jef Unavailable         Unavailable

 

                    ASHELY BRANDT [facility],  JONG Unavailable         Unavailable

 

                    TERESA BRANDT,  PABLO WALKER Unavailable         Unavailable

 

                    KAILEY BRANDT,  JAMEY ARROYO Unavailable         Unavailable

 

                    Mark BRANDT,  Salvador Unavailable         Unavailable

 

                          Unavailable               Unavailable







Functional Status





                    Name                Dates               Details

 

                                        Functional status health issues are not 

documented

                                                    Status: 







                    Name                Dates               Details

 

                                        Cognitive status health issues are not d

ocumented

                                                    Status: 







Problems





                    Name                Dates               Details

 

                                        Need for rubella vaccination (V04.3, Z23

) 

                                                    Status: Active

 

                                        Constipation (564.00, K59.00) 

                                                    Status: Active

 

                                        Morbid obesity (278.01, E66.01) 

                                                    Status: Active

 

                                        General counseling and advice for contra

ceptive management (V25.09, Z30.09) 

                                                    Status: Active

 

                                        Elevated blood pressure reading without 

diagnosis of hypertension (796.2, R03.0)



                                                    Status: Active

 

                                        Morbid obesity with body mass index (BMI

) of 45.0 to 49.9 in adult (278.01, 

E66.01) 

                                                    Status: Active

 

                                        Acid reflux (530.81, K21.9) 

                                                    Status: Active

 

                                        Epistaxis (784.7, R04.0) 

                                                    Status: Active

 

                                        Menorrhagia with irregular cycle (626.2,

 N92.1) 

                                                    Status: Active

 

                                        Type 2B von Willebrand's disease (286.4,

 D68.0) 

                                                    Status: Active

 

                                        Former smoker (V15.82, Z87.891) 

                                                    Status: Active

 

                                        Encounter for screening examination for 

sexually transmitted disease (V74.5, 

Z11.3) 

                                                    Status: Active

 

                                        Encounter for blood test for routine gen

eral physical examination (V72.62, 

Z00.00) 

                                                    Status: Active

 

                                        Encounter for routine gynecological exam

ination with Papanicolaou smear of 

cervix (V72.31, Z01.419) 

                                                    Status: Active

 

                                        Breast mass in female (611.72, N63.0) 

                                                    Status: Active

 

                                        Pelvic pain in female (625.9, R10.2) 

                                                    Status: Active







Medications





                    Name                Dates               Details

 

                                        Tranexamic Acid 650 MG Oral Tablet

Take 2 tablets po every 8 hours starting on day 1 of menses for a total of 5 
days



 

                                         Quantity: 30









FEROZ SCOTT N.P. * 



                                         Start : 18-Sep-2019





Active

Iron 100 Plus 100-250-0.025-1 MG Oral Tablet

* 



                                         Refills: 0







* 



                                         Start : 2020





Active





Allergies and Adverse Reactions





                    Name                Dates               Details

 

                    Codeine Derivatives (Allergy)                     Status: Ac

tive



 

                    Zofran TABS (Allergy)                     Reaction: Rash, It

anival

Status: Active









Past Medical History





                    Name                Dates               Details

 

                                        History of Anemia complicating pregnancy

 (648.20, O99.019) 

                                                    Status: Resolved

 

                                        History of High-risk pregnancy supervisi

on (V23.9, O09.90) 

                                                    Status: Resolved

 

                                        History of hyperemesis gravidarum (V13.2

9, Z87.59) 

                                                    Status: Resolved

 

                                        History of Hypertension complicating pre

gnancy, unspecified trimester (642.90, 

O16.9) 

                                                    Status: Resolved

 

                                        History of MTHFR mutation (V12.3, Z15.89

) 

                                                    Status: Resolved

 

                                        History of nausea (V12.79, Z87.898) 

                                                    Status: Resolved

 

                                        History of Nexplanon insertion (V25.5, Z

30.017) 

                                                    Status: Resolved







Procedures





                    Procedure           Dates               Details

 

                    [LH] TSH+Free T4    Date: 2020     

 

                    . [facility] - Affirm VPIII (BV Panel) Date: 2020     

 

                    . [facility] - PAP      Date: 2020     

 

                    [QH] HIV AB, HIV 1/2, EIA, WITH REFLEXES Date: 2020   

  

 

                    US Pelvis with Pelvis Transvaginal 38519 Date: 2020   

  

 

                    MA Digital Mammo DX Charlie  Date: 2020     

 

                    US Breast  Unilat 92284 Date: 2020     

 

                    CT Abdomen/Pelvis w/wo contrast 07769 Date: 2020     

 

                    History of  Section                     Completed 



 

                    History of Tubal Ligation                     Completed 









Immunization





                    Name                Dates               Details

 

                                        Fluzone Quadrivalent 0.5 ML Intramuscula

r Suspension #1

Lot #: [id number]            on: 3-Sep-2014             

 

                                        Tdap (Adacel) #1

Lot #: [id number]            on: 3-Sep-2014             

 

                                        Tdap (Boostrix) #1

Lot #: [id number]            on: 2014            







Family History





                    Name                Dates               Details

 

                                        Family history of essential hypertension

 (V17.49, Z82.49) 

                                                    Status: Active

 

                                        Family history of thyroid disease (V18.1

9, Z83.49) 

                                                    Status: Active

 

                                        Family history of type 2 diabetes [facility] (V18.0, Z83.3) 

                                                    Status: Active







Social History





                    Name                Dates               Details

 

                                        -

                                                    Status: 







                    Name                Dates               Details

 

                    Former smoker                            

 

                    Former smoker                            







Vital Signs





                Date            Test            Result          Details

 

                                                                 

 

                                        :55

                    BP Systolic         137 mm[Hg]          Status: Comments: Lo

cation: RUE; Position: Sitting



 

                    BP Diastolic        86 mm[Hg]           Status: Comments: Lo

cation: RUE; Position: Sitting



 

                    Height              70 in               Status: 



 

                    Weight              333.375 lb          Status: 



 

                    Body Mass Index Calculated 47.83 kg/m2         Status: 



 

                    Body Surface Area Calculated 2.59 m2             Status: 



 

                    Temperature         98.3 f              Status: Comments: Me

thod: Oral



 

                    Heart Rate          76 /min             Status: 









Results





                Date            Description     Value           Details

 

                    :05     [QLH] LIPID PANEL    

 

                                CHOLESTEROL, TOTAL 184  mg/dl (Normal) Range: <2

00





 

                                HDL CHOLESTEROL 47  mg/dl (Below low threshold) 

Range: >50





 

                                TRIGLYCERIDES   155  mg/dl (Above high threshold

) Range: <150





 

                                LDL-CHOLESTEROL 110  {MG/DL__CAL} (Above high th

reshold) Comments: Reference 

range: <100 Desirable range <100 mg/dL for primary prevention;  <70 mg/dL for 
patients with CHD or diabetic patients with > or = 2 CHD risk factors. LDL-C is 
now calculated using the Falguni calculation, which is a validated novel 
method providing better accuracy than the Friedewald equation in the estimation 
of LDL-C. Derek TIRADO et al. DAVE. 2013;310(19): 2216-4226 (http
://education.Curate.Us.com/faq/FFG494)



 

                                CHOL/HDLC RATIO 3.9  {CALC} (Normal) Range: <5.0





 

                                NON HDL CHOLESTEROL 137  {MG/DL__CAL} (Above hig

h threshold) Range: <130

Comments: For patients with diabetes plus 1 major ASCVD risk factor, treating to
 a non-HDL-C goal of <100 mg/dL (LDL-C of <70 mg/dL) is considered a therapeutic
 option.



 

                          [id number]:05           [Q] HIV-1/2 Antigen and Anti

bodies, Fourth Generation, with 

Reflexes                                 

 

                                HIV AG/AB, 4TH GEN NON-REACTIVE   (Normal) Range

: NON-REACTIVE

Comments: HIV-1 antigen and HIV-1/HIV-2 antibodies were notdetected. There is no
 laboratory evidence of HIVinfection. PLEASE NOTE: This information has been 
disclosed toyou from records whose confidentiality may beprotected by state law.
  If your state requires suchprotection, then the state law prohibits you 
frommaking any further disclosure of the informationwithout the specific written
 consent of the personto whom it pertains, or as otherwise permitted by law.A 
general authorization for the release of medical orother information is NOT 
sufficient for this purpose.  For additional information please refer t
DataPromp://education.Everpurse/faq/YDJ595(This link is being provided 
for informational/educational purposes only.)  The performance of this assay has
 not been clinicallyvalidated in patients less than 2 years old.



 

                    [id number]:05     [QLH] CMP W/EGFR     

 

                                GLUCOSE         84  mg/dl (Normal) Range: 65-99

Comments: Fasting reference interval



 

                                UREA NITROGEN (BUN) 12  mg/dl (Normal) Range: 7-

25





 

                                CREATININE      0.68  mg/dl (Normal) Range: 0.50

-1.10





 

                                eGFR NON- 117  {ML/MIN/1.7} (Nor

mal) Range: > OR = 60





 

                                eGFR  135  {ML/MIN/1.7} (Normal)

 Range: > OR = 60





 

                                BUN/CREATININE RATIO NOT APPLICABLE  {CALC} Rang

e: 6-22





 

                                SODIUM          141  mmol/L (Normal) Range: 135-

146





 

                                POTASSIUM       3.9  mmol/L (Normal) Range: 3.5-

5.3





 

                                CHLORIDE        104  mmol/L (Normal) Range: 98-1

10





 

                                CARBON DIOXIDE  25  mmol/L (Normal) Range: 20-32





 

                                CALCIUM         9.3  mg/dl (Normal) Range: 8.6-1

0.2





 

                                PROTEIN, TOTAL  7.6  g/dl (Normal) Range: 6.1-8.

1





 

                                ALBUMIN         3.9  g/dl (Normal) Range: 3.6-5.

1





 

                                GLOBULIN        3.7  {G/DL__CALC} (Normal) Range

: 1.9-3.7





 

                                ALBUMIN/GLOBULIN RATIO 1.1  {CALC} (Normal) Rang

e: 1.0-2.5





 

                                BILIRUBIN, TOTAL 0.7  mg/dl (Normal) Range: 0.2-

1.2





 

                                ALKALINE PHSPHATASE 77  u/l (Normal) Range: 33-1

15





 

                                AST             54  u/l (Above high threshold) R

michaela: 10-30





 

                                ALT             47  u/l (Above high threshold) R

michaela: 6-29





 

                    :05     [QL] CBC (INCLUDES DIFF/PLT)   

 

                                WHITE BLOOD CELL COUNT 8.1  {Thousand/u} (Normal

) Range: 3.8-10.8





 

                                RED BLOOD CELL COUNT 4.66  {Million/uL} (Normal)

 Range: 3.80-5.10





 

                                HEMAGLOBIN      11.9  g/dl (Normal) Range: 11.7-

15.5





 

                                HEMATOCRIT      37.7  % (Normal) Range: 35.0-45.

0





 

                                MCV             80.9  fL (Normal) Range: 80.0-10

0.0





 

                                MCH             25.5  pg (Below low threshold) R

michaela: 27.0-33.0





 

                                MCHC            31.6  g/dl (Below low threshold)

 Range: 32.0-36.0





 

                                RDW             14.5  % (Normal) Range: 11.0-15.

0





 

                                PLATELET COUNT  283  {Thousand/u} (Normal) Range

: 140-400





 

                                MPV             11.4  fL (Normal) Range: 7.5-12.

5





 

                                ABSOLUTE NEUTROPHILS 4957  {cells/uL} (Normal) R

michaela: 7236-7145





 

                                ABSOLUTE LYMPHOCYTES 2252  {cells/uL} (Normal) R

michaela: 850-3900





 

                                ABSOLUTE MONOCYTES 510  {cells/uL} (Normal) Rang

e: 200-950





 

                                ABSOLUTE EOSINOPHILS 332  {cells/uL} (Normal) Ra

nge: 





 

                                ABSOLUTE BASOPHILS 49  {cells/uL} (Normal) Range

: 0-200





 

                                        NEUTROPHILS         61.2  % (Normal)  

 

                                        LYMPHOCYTES         27.8  % (Normal)  

 

                                        MONOCYTES           6.3  % (Normal)  

 

                                        EOSINOPHILS         4.1  % (Normal)  

 

                                        BASOPHILS           0.6  % (Normal)  

 

                    :05     [QH] HEPATITIS B SURFACE ANTIGEN W/REFL 

CONFIRM   

 

                                HEPATITIS B SURFACE ANTIGEN NON-REACTIVE   (Norm

al) Range: NON-REACTIVE





 

                    :05     [QL] HEPATITIS C ANTIBODY   

 

                                HEPATITIS C ANTIBODY NON-REACTIVE   (Normal) Ran

ge: NON-REACTIVE





 

                                SIGNAL TO CUT-OFF 0.01   (Normal) Range: <1.00

Comments: HCV antibody was non-reactive. There is no laboratory evidence of HCV 
infection. In most cases, no further action is required. However,if recent HCV 
exposure is suspected, a test for HCV RNA(test code 92959) is suggested. For 
additional information please refer 
tohttp://Confide.Everpurse/faq/BPX79a7(This link is being provided 
for informational/educational purposes only.)



 

                    :05     [QL] T4, FREE       

 

                                T4, FREE        1.1  ng/dl (Normal) Range: 0.8-1

.8





 

                    :     [[facility]] TSH, 3RD GENERATION   

 

                                TSH             1.71  {MIU/L} (Normal) Comments:

 Reference Range                     > or 

= 20 Years  0.40-4.50                          Pregnancy Ranges          First 
trimester    0.26-2.66          Second trimester   0.55-2.73          Third 
trimester    0.43-2.91



 

                    :     [[facility]] HEMOGLOBIN A1c   

 

                                HEMOGLOBIN A1c  5.9  {%_of_total} (Above high th

reshold) Range: <5.7

Comments: For someone without known diabetes, a hemoglobin A1c value between 
5.7% and 6.4% is consistent withprediabetes and should be confirmed with a 
follow-up test. For someone with known diabetes, a value &lt;7%indicates that 
their diabetes is well controlled. H0roilgpmr should be individualized based on 
duration ofdiabetes, age, comorbid conditions, and otherconsiderations. This 
assay result is consistent with an increased riskof diabetes. Currently, no 
consensus exists regarding use ofhemoglobin A1c for diagnosis of diabetes for 
children.



 

                    :05     [[facility]] RPR            

 

                                RPR (MONITOR) W/REFL TITER (REFL) NON-REACTIVE  

 (Normal) Range: NON-REACTIVE











Plan of Care





                    Name                Dates               Details

 

                                        Planned Observations 

 

                    Planned Goals not documented                      

 

                                        Planned Encounters 

 

                                        Appointment; ADULT, HEMOPHILIA

                                        On: 2020 12:00



 

                                        Appointment; JONG LUND M.D.

                                        On: 2020 15:00



 

                                        Appointment; ADULT, HEMOPHILIA

                                        On: 3-Jeremy-2020 9:00









Instructions





                    Name                Dates               Details

 

                                        Instructions not documented

                                                     







Encounters





                                        Appointment; PABLO MOORE M.D. 

Encounter Diagnosis: Problem not documented

                                        On: 2018 13:30



 

                                        Appointment; [address] 

Encounter Diagnosis: Problem not documented

                                        On: 2018 14:00



 

                                        Appointment; PABLO MOORE M.D. 

Encounter Diagnosis: Problem not documented

                                        On: 2019 8:00



 

                                        Appointment; SALVADOR LOPEZ M.D . 

Encounter Diagnosis: Problem not documented

                                        On: 2019 9:00



 

                                        Appointment; ADULT, HEMOPHILIA 

Encounter Diagnosis: Problem not documented

                                        On: 18-Sep-2019 9:00



 

                                        Appointment; ADULT, HEMOPHILIA 

Encounter Diagnosis: Problem not documented

                                        On: 4-Dec-2019 9:00



 

                                        Appointment; JONG LUND M.D. 

Encounter Diagnosis: Problem not documented

                                        On: 2020 14:15



 

                                        Appointment; ADULT, HEMOPHILIA 

Encounter Diagnosis: Problem not documented

                                        On: 3-Richi-2020 9:00

## 2020-05-24 NOTE — XMS REPORT
Summary of Care

                             Created on: 2020



WANDA ARITA

External Reference #: [id number]

: 1990

Sex: Female



Demographics





                          Address                   [address] [place] [address]

 

                          Preferred Language        English

 

                          Marital Status            Unknown

 

                          Faith Affiliation     Unknown

 

                          Race                      White

 

                          Ethnic Group              Non-





Author





                          Author                    Isi HERNÁNDEZ, WANDA 

[place]

 

                          Organization              Unknown

 

                          Address                   Unknown

 

                          Phone                     Unavailable







Care Team Providers





                    Care Team Member Name Role                Phone

 

                    ADULT,  HEMOPHILIA  Unavailable         Unavailable

 

                    ASHELY CONROY,  JONG Unavailable         Unavailable

 

                    FEROZ ESQUIVEL Unavailable         Unavailable

 

                    KAMALJIT BRANDT,  ALEJANDRO ARROYO  Unavailable         Unavailable

 

                    Jef Ortega MD Unavailable         Unavailable

 

                    ASHELY BRANDT UT,  JONG Unavailable         Unavailable

 

                    TERESA BRANDT,  PABLO WALKER Unavailable         Unavailable

 

                    KAILYE BRANDT,  JAMEY ARROYO Unavailable         Unavailable

 

                    Mark BRANDT,  Salvador Unavailable         Unavailable

 

                          Unavailable               Unavailable







Functional Status





                    Name                Dates               Details

 

                                        Functional status health issues are not 

documented

                                                    Status: 







                    Name                Dates               Details

 

                                        Cognitive status health issues are not d

ocumented

                                                    Status: 







Problems





                    Name                Dates               Details

 

                                        Need for rubella vaccination (V04.3, Z23

) 

                                                    Status: Active

 

                                        Constipation (564.00, K59.00) 

                                                    Status: Active

 

                                        Morbid obesity (278.01, E66.01) 

                                                    Status: Active

 

                                        General counseling and advice for contra

ceptive management (V25.09, Z30.09) 

                                                    Status: Active

 

                                        Elevated blood pressure reading without 

diagnosis of hypertension (796.2, R03.0)



                                                    Status: Active

 

                                        Morbid obesity with body mass index (BMI

) of 45.0 to 49.9 in adult (278.01, 

E66.01) 

                                                    Status: Active

 

                                        Acid reflux (530.81, K21.9) 

                                                    Status: Active

 

                                        Epistaxis (784.7, R04.0) 

                                                    Status: Active

 

                                        Menorrhagia with irregular cycle (626.2,

 N92.1) 

                                                    Status: Active

 

                                        Type 2B von Willebrand's disease (286.4,

 D68.0) 

                                                    Status: Active

 

                                        Former smoker (V15.82, Z87.891) 

                                                    Status: Active

 

                                        Encounter for screening examination for 

sexually transmitted disease (V74.5, 

Z11.3) 

                                                    Status: Active

 

                                        Encounter for blood test for routine gen

eral physical examination (V72.62, 

Z00.00) 

                                                    Status: Active

 

                                        Encounter for routine gynecological exam

ination with Papanicolaou smear of 

cervix (V72.31, Z01.419) 

                                                    Status: Active

 

                                        Breast mass in female (611.72, N63.0) 

                                                    Status: Active

 

                                        Pelvic pain in female (625.9, R10.2) 

                                                    Status: Active

 

                                        Follow up (V67.9, Z09) 

                                                    Status: Active

 

                                        History of  section, unknown sca

r (V45.89, Z98.891) 

                                                    Status: Active

 

                                        Bacterial vaginosis (616.10, N76.0) 

                                                    Status: Active







Medications





                    Name                Dates               Details

 

                                        Tranexamic Acid 650 MG Oral Tablet

Take 2 tablets po every 8 hours starting on day 1 of menses for a total of 5 
days



 

                                         Quantity: 30









FEROZ ESQUIVEL * 



                                         Start : 18-Sep-2019





Active

metroNIDAZOLE 500 MG Oral Tablet

TAKE ONE TABLET BY MOUTH TWICE A DAY . DO NOT DRINK ALCOHOL

* 



                           Quantity: 14              Refills: 0









JONG LUND M.D. * 



                                         Start : 5-Mar-2020





Active





Allergies and Adverse Reactions





                    Name                Dates               Details

 

                    Codeine Derivatives (Allergy)                     Status: Ac

tive



 

                    Zofran TABS (Allergy)                     Reaction: Rash, It

anival

Status: Active









Past Medical History





                    Name                Dates               Details

 

                                        History of Anemia complicating pregnancy

 (648.20, O99.019) 

                                                    Status: Resolved

 

                                        History of High-risk pregnancy supervisi

on (V23.9, O09.90) 

                                                    Status: Resolved

 

                                        History of hyperemesis gravidarum (V13.2

9, Z87.59) 

                                                    Status: Resolved

 

                                        History of Hypertension complicating pre

gnancy, unspecified trimester (642.90, 

O16.9) 

                                                    Status: Resolved

 

                                        History of MTHFR mutation (V12.3, Z15.89

) 

                                                    Status: Resolved

 

                                        History of nausea (V12.79, Z87.898) 

                                                    Status: Resolved

 

                                        History of Nexplanon insertion (V25.5, Z

30.017) 

                                                    Status: Resolved







Procedures





                    Procedure           Dates               Details

 

                    [QLH] CBC (INCLUDES DIFF/PLT) Date: 12-Mar-2020    

 

                    [H] Iron, TIBC \T\ Ferritin Date: 12-Mar-2020    

 

                    History of  Section                     Completed 



 

                    History of Tubal Ligation                     Completed 









Immunization





                    Name                Dates               Details

 

                                        Fluzone Quadrivalent 0.5 ML Intramuscula

r Suspension #1

Lot #: [id number]            on: 3-Sep-2014             

 

                                        Tdap (Adacel) #1

Lot #: [id number]            on: 3-Sep-2014             

 

                                        Tdap (Boostrix) #1

Lot #: [id number]            on: 2014            







Family History





                    Name                Dates               Details

 

                                        Family history of essential hypertension

 (V17.49, Z82.49) 

                                                    Status: Active

 

                                        Family history of thyroid disease (V18.1

9, Z83.49) 

                                                    Status: Active

 

                                        Family history of type 2 diabetes [facility] (V18.0, Z83.3) 

                                                    Status: Active







Social History





                    Name                Dates               Details

 

                                        -

                                                    Status: 







                    Name                Dates               Details

 

                    Ex-smoker (finding)                      

 

                    Ex-smoker (finding)                      







Vital Signs





                Date            Test            Result          Details

 

                                                                 

 

                                        [id number]

                    Systolic blood pressure 135 mm[Hg]          Status: Comments

: Location: RUE; Position: 

Sitting



 

                    Diastolic blood pressure 86 mm[Hg]           Status: Comment

s: Location: RUE; Position: 

Sitting



 

                    Body height         70 in               Status: 



 

                    Weight              338 lb              Status: 



 

                    Body mass index (BMI) [Ratio] 48.5 kg/m2          Status: 



 

                    Body surface area Derived from formula 2.61 m2             S

tatus: 



 

                    Body temperature    98.5 f              Status: Comments: Me

thod: Oral



 

                    Heart Rate          88 /min             Status: 









Results





                Date            Description     Value           Details

 

                    Results not documented                      

 

                                                                 







Plan of Care





                    Name                Dates               Details

 

                                        Planned Observations 

 

                    Planned Goals not documented                      

 

                                        Planned Encounters 

 

                                        Appointment; ADULT, HEMOPHILIA

                                        On: 3-Jeremy-2020 9:00









Interventions Provided

Labs/Procedures/Imaging* [H] Iron, TIBC \T\ Ferritin; To Be Done: 12 Mar 2020

* [QLH] CBC (INCLUDES DIFF/PLT); To Be Done: 12 Mar 2020





Instructions





                    Name                Dates               Details

 

                                        Instructions not documented

                                                     







Encounters





                                        Appointment; PABLO MOORE M.D. 

Encounter Diagnosis: Problem not documented

                                        On: 2018 13:30



 

                                        Appointment; [address] 

Encounter Diagnosis: Problem not documented

                                        On: 2018 14:00



 

                                        Appointment; PABLO MOORE M.D. 

Encounter Diagnosis: Problem not documented

                                        On: 2019 8:00



 

                                        Appointment; SALVADOR LOPEZ M.D . 

Encounter Diagnosis: Problem not documented

                                        On: 2019 9:00



 

                                        Appointment; ADULT, HEMOPHILIA 

Encounter Diagnosis: Problem not documented

                                        On: 18-Sep-2019 9:00



 

                                        Appointment; ADULT, HEMOPHILIA 

Encounter Diagnosis: Problem not documented

                                        On: 4-Dec-2019 9:00



 

                                        Appointment; JONG LUND M.D. 

Encounter Diagnosis: Problem not documented

                                        On: 2020 14:15



 

                                        Appointment; ADULT, HEMOPHILIA 

Encounter Diagnosis: Problem not documented

                                        On: 3-Richi-2020 9:00



 

                                        Appointment; ADULT, HEMOPHILIA 

Encounter Diagnosis: Problem not documented

                                        On: 2020 12:00



 

                                        Appointment; JONG LUND M.D. 

Encounter Diagnosis: Problem not documented

                                        On: 2020 13:30



 

                                        Appointment; ADULT, HEMOPHILIA 

Encounter Diagnosis: Problem not documented

                                        On: 12-Mar-2020 8:00

## 2020-05-24 NOTE — XMS REPORT
Summary of Care

                             Created on: 2020



WANDA ARITA

External Reference #: [id number]

: 1990

Sex: Female



Demographics





                          Address                   [address] [place] [address]

 

                          Preferred Language        English

 

                          Marital Status            Unknown

 

                          Jainism Affiliation     Unknown

 

                          Race                      White

 

                          Ethnic Group              Non-





Author





                          Author Carmen CUENCA, WANDA Cognusekaushik

 

                          [facility]              Unknown

 

                          Address                   Unknown

 

                          Phone                     Unavailable







Care Team Providers





                    Care Team Member Name Role                Phone

 

                    ASHELY CONROY,  JONG Unavailable         Unavailable

 

                    SONIA ANTOINE,  FEROZ Unavailable         Unavailable

 

                    KAMALJIT BRANDT,  ALEJANDRO ARROYO  Unavailable         Unavailable

 

                    Jordan BRANDT,  Jef Unavailable         Unavailable

 

                    ASHELY BRANDT [place],  JONG Unavailable         Unavailable

 

                    TERESA BRANDT,  PABLO WALKER Unavailable         Unavailable

 

                    KAILEY BRANDT,  JAMEY ARROYO Unavailable         Unavailable

 

                    Mark BRANDT,  Salvador Unavailable         Unavailable

 

                          Unavailable               Unavailable







Functional Status





                    Name                Dates               Details

 

                                        Functional status health issues are not 

documented

                                                    Status: 







                    Name                Dates               Details

 

                                        Cognitive status health issues are not d

ocumented

                                                    Status: 







Problems





                    Name                Dates               Details

 

                                        Need for rubella vaccination (V04.3, Z23

) 

                                                    Status: Active

 

                                        Constipation (564.00, K59.00) 

                                                    Status: Active

 

                                        Morbid obesity (278.01, E66.01) 

                                                    Status: Active

 

                                        General counseling and advice for contra

ceptive management (V25.09, Z30.09) 

                                                    Status: Active

 

                                        Elevated blood pressure reading without 

diagnosis of hypertension (796.2, R03.0)



                                                    Status: Active

 

                                        Morbid obesity with body mass index (BMI

) of 45.0 to 49.9 in adult (278.01, 

E66.01) 

                                                    Status: Active

 

                                        Acid reflux (530.81, K21.9) 

                                                    Status: Active

 

                                        Epistaxis (784.7, R04.0) 

                                                    Status: Active

 

                                        Menorrhagia with irregular cycle (626.2,

 N92.1) 

                                                    Status: Active

 

                                        Type 2B von Willebrand's disease (286.4,

 D68.0) 

                                                    Status: Active

 

                                        Former smoker (V15.82, Z87.891) 

                                                    Status: Active

 

                                        Encounter for screening examination for 

sexually transmitted disease (V74.5, 

Z11.3) 

                                                    Status: Active

 

                                        Encounter for blood test for routine gen

eral physical examination (V72.62, 

Z00.00) 

                                                    Status: Active

 

                                        Encounter for routine gynecological exam

ination with Papanicolaou smear of 

cervix (V72.31, Z01.419) 

                                                    Status: Active

 

                                        Breast mass in female (611.72, N63.0) 

                                                    Status: Active

 

                                        Pelvic pain in female (625.9, R10.2) 

                                                    Status: Active

 

                                        Follow up (V67.9, Z09) 

                                                    Status: Active

 

                                        History of  section, unknown sca

r (V45.89, Z98.891) 

                                                    Status: Active







Medications





                    Name                Dates               Details

 

                                        Tranexamic Acid 650 MG Oral Tablet

Take 2 tablets po every 8 hours starting on day 1 of menses for a total of 5 
days



 

                                         Quantity: 30









FEROZ SCOTT N.P. * 



                                         Start : 18-Sep-2019





Active





Allergies and Adverse Reactions





                    Name                Dates               Details

 

                    Codeine Derivatives (Allergy)                     Status: Ac

tive



 

                    Zofran TABS (Allergy)                     Reaction: Rash, It

anival

Status: Active









Past Medical History





                    Name                Dates               Details

 

                                        History of Anemia complicating pregnancy

 (648.20, O99.019) 

                                                    Status: Resolved

 

                                        History of High-risk pregnancy supervisi

on (V23.9, O09.90) 

                                                    Status: Resolved

 

                                        History of hyperemesis gravidarum (V13.2

9, Z87.59) 

                                                    Status: Resolved

 

                                        History of Hypertension complicating pre

gnancy, unspecified trimester (642.90, 

O16.9) 

                                                    Status: Resolved

 

                                        History of MTHFR mutation (V12.3, Z15.89

) 

                                                    Status: Resolved

 

                                        History of nausea (V12.79, Z87.898) 

                                                    Status: Resolved

 

                                        History of Nexplanon insertion (V25.5, Z

30.017) 

                                                    Status: Resolved







Procedures





                    Procedure           Dates               Details

 

                    [LH] TSH+Free T4    Date: 2020     

 

                    . [facility] - Affirm VPIII (BV Panel) Date: 2020     

 

                    . [facility] - PAP      Date: 2020     

 

                    [QH] HIV AB, HIV 1/2, EIA, WITH REFLEXES Date: 2020   

  

 

                    US Pelvis with Pelvis Transvaginal 64456 Date: 2020   

  

 

                    MA Digital Mammo DX Charlie  Date: 2020     

 

                    US Breast  Unilat 35078 Date: 2020     

 

                    CT Abdomen/Pelvis w/wo contrast 31721 Date: 2020     

 

                    History of  Section                     Completed 



 

                    History of Tubal Ligation                     Completed 









Immunization





                    Name                Dates               Details

 

                                        Fluzone Quadrivalent 0.5 ML Intramuscula

r Suspension #1

Lot #: [id number]            on: 3-Sep-2014             

 

                                        Tdap (Adacel) #1

Lot #: [id number]            on: 3-Sep-2014             

 

                                        Tdap (Boostrix) #1

Lot #: [id number]            on: 2014            







Family History





                    Name                Dates               Details

 

                                        Family history of essential hypertension

 (V17.49, Z82.49) 

                                                    Status: Active

 

                                        Family history of thyroid disease (V18.1

9, Z83.49) 

                                                    Status: Active

 

                                        Family history of type 2 diabetes [facility] (V18.0, Z83.3) 

                                                    Status: Active







Social History





                    Name                Dates               Details

 

                                        -

                                                    Status: 







                    Name                Dates               Details

 

                    Former smoker                            

 

                    Former smoker                            







Vital Signs





                Date            Test            Result          Details

 

                                                                 

 

                                        [id number]

                    BP Systolic         135 mm[Hg]          Status: Comments: Lo

cation: RUE; Position: Sitting



 

                    BP Diastolic        86 mm[Hg]           Status: Comments: Lo

cation: RUE; Position: Sitting



 

                    Height              70 in               Status: 



 

                    Weight              338 lb              Status: 



 

                    Body Mass Index Calculated 48.5 kg/m2          Status: 



 

                    Body Surface Area Calculated 2.61 m2             Status: 



 

                    Temperature         98.5 f              Status: Comments: Me

thod: Oral



 

                    Heart Rate          88 /min             Status: 









Results





                Date            Description     Value           Details

 

                    Results not documented                      

 

                                                                 







Plan of Care





                    Name                Dates               Details

 

                                        Planned Observations 

 

                    Planned Goals not documented                      

 

                                        Planned Encounters 

 

                                        Appointment; ADULT, HEMOPHILIA

                                        On: 3-Jeremy-2020 9:00









Instructions





                    Name                Dates               Details

 

                                        Instructions not documented

                                                     







Encounters





                                        Appointment; PABLO MOORE M.D. 

Encounter Diagnosis: Problem not documented

                                        On: 2018 13:30



 

                                        Appointment; [address] 

Encounter Diagnosis: Problem not documented

                                        On: 2018 14:00



 

                                        Appointment; PABLO MOORE M.D. 

Encounter Diagnosis: Problem not documented

                                        On: 2019 8:00



 

                                        Appointment; SALVADOR LOPEZ M.D . 

Encounter Diagnosis: Problem not documented

                                        On: 2019 9:00



 

                                        Appointment; ADULT, HEMOPHILIA 

Encounter Diagnosis: Problem not documented

                                        On: 18-Sep-2019 9:00



 

                                        Appointment; ADULT, HEMOPHILIA 

Encounter Diagnosis: Problem not documented

                                        On: 4-Dec-2019 9:00



 

                                        Appointment; JONG LUND M.D. 

Encounter Diagnosis: Problem not documented

                                        On: 2020 14:15



 

                                        Appointment; ADULT, HEMOPHILIA 

Encounter Diagnosis: Problem not documented

                                        On: 3-Richi-2020 9:00



 

                                        Appointment; ADULT, HEMOPHILIA 

Encounter Diagnosis: Problem not documented

                                        On: 2020 12:00



 

                                        Appointment; JONG LUND M.D. 

Encounter Diagnosis: Problem not documented

                                        On: 2020 13:30

## 2020-05-24 NOTE — XMS REPORT
Summary of Care: 19 - 19

                             Created on: 2061



WANDA ARITA

External Reference #: [id number]

: 1990

Sex: Female



Demographics





                          Address                   [address] [place]  [address]

 

                          Home Phone                [phone]

 

                          Preferred Language        English

 

                          Marital Status            

 

                          Yarsanism Affiliation     Muslim

 

                          Race                      White

 

                                        Additional Race(s)  

 

                          Ethnic Group              /Latin





Author





                          Author                    [facility]

 

                          Organization              [facility]

 

                          Address                   Unknown

 

                          Phone                     Unavailable







Encounter





MANDY Alonso(FIN) [id number] Date(s): 19 - 19

[facility] [address] [place] [address]-     [phone]
[phone]

Discharge Disposition: Home or Self Care

Attending Physician: Salvador Flores MD

Referring Physician: Salvador Flores MD





Vital Signs





 



                           Most recent to            1



                                         oldest [Reference 



                                         Range]: 

 

 



                           Height                    175.3 cm



                                         (19 8:30 AM)

 

 



                           Temperature Oral          98.1 DegF



                           [96.4-99.1 DegF]          (19 8:30 AM)

 

 



                           Blood Pressure            124/82 mmHg



                           [/60-90 mmHg]       (19 8:30 AM)

 

 



                           Respiratory Rate          18 BRMIN



                           [14-20 BRMIN]             (19 8:30 AM)

 

 



                           Peripheral Pulse          71 bpm



                           Rate [ bpm]         (19 8:30 AM)

 

 



                           Weight                    150.8 kg



                                         (19 8:30 AM)

 

 



                           Body Mass Index           49.07 m2



                                         (19 8:30 AM)







Problem List





    



              Condition    Effective Dates  Status       Health Status  Informan

t

 

    



                           Anemia(Confirmed)         Resolved  

 

    



                           Coagulopathy(Confirm      Active  



                                         ed)    

 

    



                           Morbid                    Active  



                                         obesity(Confirmed)    

 

    



                           Pre-eclampsia(Confir      Active  



                                         med)    

 

    



                     Pre-eclampsia(2014                Resolved  



                                         med)    

 

    



                     Pregnant(Confirmed)  10/3/14 - 1/11/15   Resolved  

 

    



                     Pregnant(Confirmed)  04 - 05   Resolved  

 

    



                     Pregnant(Confirmed)  06 - 3/29/07    Resolved  







Allergies, Adverse Reactions, Alerts





   



                 Substance       Reaction        Severity        Status

 

   



                           codeine                   Active

 

   



                           Zofran                    Active







Medications





ferrous sulfate 325 mg oral enteric coated tablet

325 mg = 1 tab, PO, Daily, 0 Refill(s)

Start Date: 19

Stop Date: 19

Status: Discontinued



iron sulfate (ferrous sulfate) 325 mg oral tablet

325 mg = 1 tab, PO, Every Other Day, Take 30 minutes prior to meal, # 15 tab, 0 
Refill(s), other

Start Date: 19

Status: Ordered



Results









 



                           Most recent to            1



                                         oldest [Reference 



                                         Range]: 

 

 



                           Neutrophils #             4.5 K/CMM



                           [1.5-8.1 K/CMM]           (19 8:01 AM)

 

 



                           Lymphocytes #             2.2 K/CMM



                           [1.0-5.5 K/CMM]           (19 8:01 AM)

 

 



                           Monocytes # [0.0-0.8      0.5 K/CMM



                           K/CMM]                    (19 8:01 AM)

 

 



                           Eosinophils #             0.4 K/CMM



                           [0.0-0.5 K/CMM]           (19 8:01 AM)

 

 



                           Basophils # [0.0-0.2      0.1 K/CMM



                           K/CMM]                    (19 8:01 AM)

 

 



                           Basophils [0.0-1.0        0.9 %



                           %]                        (19 8:01 AM)

 

 



                           Eosinophils [0.0-4.0      5.6 %



                           %]                        *HI*



                                         (19 8:01 AM)

 

 



                           Factor VIII [       81 %



                           %]                        (19 9:11 AM)

 

 



                           Folate Lvl [>=3.0         19.7 ng/mL



                           ng/mL]                    (19 8:01 AM)

 

 



                           Hct [36.0-48.0 %]         36.8 %



                                         (19 8:01 AM)

 

 



                           Hgb [12.0-16.0 g/dL]      12.0 g/dL



                                         (19 8:01 AM)

 

 



                           Lymphocytes               28.3 %



                           [20.0-40.0 %]             (19 8:01 AM)

 

 



                           MCH [27.0-31.0 pg]        26.6 pg



                                         *LOW*



                                         (19 8:01 AM)

 

 



                           MCHC [32.0-36.0           32.7 g/dL



                           g/dL]                     (19 8:01 AM)

 

 



                           MCV [80.0-98.0 fL]        81.3 fL



                                         (19 8:01 AM)

 

 



                           Monocytes [2.0-12.0       6.1 %



                           %]                        (19 8:01 AM)

 

 



                           MPV [7.4-10.4 fL]         9.6 fL



                                         (19 8:01 AM)

 

 



                           Mix Stdy Intrp            Baseline PTT is prolonged, 

corrected with mixing. Thrombin Time

is normal at



                                         16.6 sec. Impression: results are sugge

stive of factor deficiency in the



                                         intrinsic pathway (factors VIII, IX, XI

, or XII).



                                         CPT: 61390



                                         *NA*



                                         (19 8:01 AM)

 

 



                           Platelet [133-450         258 K/CMM



                           K/CMM]                    (19 8:01 AM)

 

 



                           Segs [45.0-75.0 %]        59.1 %



                                         (19 8:01 AM)

 

 



                           PT 1:1 1Hr                12.8 seconds



                                         *NA*



                                         (19 8:01 AM)

 

 



                           PT 1:1 Imm                12.8 seconds



                                         *NA*



                                         (19 8:01 AM)

 

 



                           PT Baseline               13.2 seconds



                           [12.0-14.7 seconds]       (19 8:01 AM)

 

 



                           PTT 1:1 Imm               33.8 seconds



                                         *NA*



                                         (19 8:01 AM)

 

 



                           PTT Baseline              38.8 seconds



                           [22.9-35.8 seconds]       *HI*



                                         (19 8:01 AM)

 

 



                           PTT 1:1 1Hr               34.8 seconds



                                         *NA*



                                         (19 8:01 AM)

 

 



                           RBC [4.20-5.40            4.53 M/CMM



                           M/CMM]                    (19 8:01 AM)

 

 



                           RDW [11.5-14.5 %]         16.1 %



                                         *HI*



                                         (19 8:01 AM)

 

 



                           Vitamin B12 Lvl           551 pg/mL



                           [254-1320 pg/mL]          (19 8:01 AM)

 

 



                           vW Interp                 von Willebrand panel shows 

normal level of Factor VIII (81%), and 

decreased



                                         levels of vWF (antigen and functional).

 These results are suggestive of mild 

von



                                         Willebrand disease. Clinical correlatio

n is suggested.



                                         CPT: 34608



                                         *NA*



                                         (19 9:11 AM)

 

 



                           vWF Assay [ %]      32 %



                                         *LOW*



                                         (19 9:11 AM)

 

 



                           vWF Antigen [       38 %



                           %]                        *LOW*



                                         (19 9:11 AM)

 

 



                           WBC [3.7-10.4 K/CMM]      7.7 K/CMM



                                         (19 8:01 AM)

 

 



                           TT Baseline               16.6 seconds



                           [15.0-21.1 seconds]       (19 8:01 AM)







Immunizations





Given and Recorded



   



                 Vaccine         Date            Status          Refusal Reason

 

   



                     measles/mumps/rubella virus vaccine  1/12/15             Gi

dorian 







Procedures





    



              Procedure    Date         Related Diagnosis  Body Site    Status

 

    



                            section          Completed

 

    



                           Tubal ligation            Completed







Social History





 



                           Social History Type       Response

 

 



                           Sexual                    Sexually active: Yes.  Part

ner with STD? No.  History of sexual abuse: 

No.

 

 



                           Smoking Status            Never smoker; Type: Cigaret

kel; Exposure to Tobacco Smoke None;

Cigarette



                                         Smoking Last 365 Days No; Reg Smoking C

essation Counseling No



                                         entered on: 19







Assessment and Plan





No data available for this section

## 2020-05-24 NOTE — XMS REPORT
Summary of Care

                             Created on: 2020



WANDA ARITA

External Reference #: [id number]

: 1990

Sex: Female



Demographics





                          Address                   [address] [place]  [address]

 

                          Preferred Language        English

 

                          Marital Status            Unknown

 

                          Congregation Affiliation     Unknown

 

                          Race                      White

 

                          Ethnic Group              Non-





Author





                          Author                    [facility]

 

                          Organization              [facility]

 

                          Address                   [address] [place]  [address]



 

                          Phone                     Unavailable







Care Team Providers





                    Care Team Member Name Role                Phone

 

                    SONIA ANTOINE,  FEROZ Unavailable         Unavailable

 

                    KAMALJIT BRANDT,  ALEJANDRO ARROYO  Unavailable         Unavailable

 

                    Jordan BRANDT,  Jef Unavailable         Unavailable

 

                    ASHELY BRANDT [facility],  JONG Unavailable         Unavailable

 

                    TERESA BRANDT,  PABLO WALKER Unavailable         Unavailable

 

                    KAILEY BRANDT,  JAMEY ARROYO Unavailable         Unavailable

 

                    Mark BRANDT,  Salvador Unavailable         Unavailable

 

                          Unavailable               Unavailable







Functional Status





                    Name                Dates               Details

 

                                        Functional status health issues are not 

documented

                                                    Status: 







                    Name                Dates               Details

 

                                        Cognitive status health issues are not d

ocumented

                                                    Status: 







Problems





                    Name                Dates               Details

 

                                        Need for rubella vaccination (V04.3, Z23

) 

                                                    Status: Active

 

                                        Constipation (564.00, K59.00) 

                                                    Status: Active

 

                                        Morbid obesity (278.01, E66.01) 

                                                    Status: Active

 

                                        General counseling and advice for contra

ceptive management (V25.09, Z30.09) 

                                                    Status: Active

 

                                        Elevated blood pressure reading without 

diagnosis of hypertension (796.2, R03.0)



                                                    Status: Active

 

                                        Morbid obesity with body mass index (BMI

) of 45.0 to 49.9 in adult (278.01, 

E66.01) 

                                                    Status: Active

 

                                        Acid reflux (530.81, K21.9) 

                                                    Status: Active

 

                                        Menorrhagia with irregular cycle (626.2,

 N92.1) 

                                                    Status: Active

 

                                        Type 2B von Willebrand's disease (286.4,

 D68.0) 

                                                    Status: Active

 

                                        Encounter for screening examination for 

sexually transmitted disease (V74.5, 

Z11.3) 

                                                    Status: Active

 

                                        Encounter for blood test for routine gen

eral physical examination (V72.62, 

Z00.00) 

                                                    Status: Active

 

                                        Encounter for routine gynecological exam

ination with Papanicolaou smear of 

cervix (V72.31, Z01.419) 

                                                    Status: Active

 

                                        Breast mass in female (611.72, N63.0) 

                                                    Status: Active

 

                                        Pelvic pain in female (625.9, R10.2) 

                                                    Status: Active

 

                                        Epistaxis (784.7, R04.0) 

                                                    Status: Active

 

                                        Former smoker (V15.82, Z87.891) 

                                                    Status: Active







Medications





                    Name                Dates               Details

 

                                        Iron 100 Plus 100-250-0.025-1 MG Oral Ta

blet



 









* 



                                         Start : 2020





Active

Tranexamic Acid 650 MG Oral Tablet

Take 2 tablets po every 8 hours starting on day 1 of menses for a total of 5 day
s

* 



                           Quantity: 30              Refills: 2









SCOTT N.P., FEROZ * 



                                         Start : 18-Sep-2019





Active





Allergies and Adverse Reactions





                    Name                Dates               Details

 

                    Codeine Derivatives (Allergy)                     Status: Ac

tive



 

                    Zofran TABS (Allergy)                     Reaction: Rash, It

anival

Status: Active









Past Medical History





                    Name                Dates               Details

 

                                        History of Anemia complicating pregnancy

 (648.20, O99.019) 

                                                    Status: Resolved

 

                                        History of High-risk pregnancy supervisi

on (V23.9, O09.90) 

                                                    Status: Resolved

 

                                        History of hyperemesis gravidarum (V13.2

9, Z87.59) 

                                                    Status: Resolved

 

                                        History of Hypertension complicating pre

gnancy, unspecified trimester (642.90, 

O16.9) 

                                                    Status: Resolved

 

                                        History of MTHFR mutation (V12.3, Z15.89

) 

                                                    Status: Resolved

 

                                        History of nausea (V12.79, Z87.898) 

                                                    Status: Resolved

 

                                        History of Nexplanon insertion (V25.5, Z

30.017) 

                                                    Status: Resolved







Procedures





                    Procedure           Dates               Details

 

                    [LH] TSH+Free T4    Date: 2020     

 

                    . [facility] - Affirm VPIII (BV Panel) Date: 2020     

 

                    . [facility] - PAP      Date: 2020     

 

                    [QH] HIV AB, HIV 1/2, EIA, WITH REFLEXES Date: 2020   

  

 

                    US Pelvis with Pelvis Transvaginal 66324 Date: 2020   

  

 

                    MA Digital Mammo DX Charlie  Date: 2020     

 

                    US Breast  Unilat 00851 Date: 2020     

 

                    CT Abdomen/Pelvis w/wo contrast 42317 Date: 2020     

 

                    History of  Section                     Completed 



 

                    History of Tubal Ligation                     Completed 









Immunization





                    Name                Dates               Details

 

                                        Tdap (Adacel) #1

Lot #: [id number]            on: 3-Sep-2014             

 

                                        Fluzone Quadrivalent 0.5 ML Intramuscula

r Suspension #1

Lot #: [id number]            on: 3-Sep-2014             

 

                                        Tdap (Boostrix) #1

Lot #: [id number]            on: 2014            







Family History





                    Name                Dates               Details

 

                                        Family history of essential hypertension

 (V17.49, Z82.49) 

                                                    Status: Active

 

                                        Family history of thyroid disease (V18.1

9, Z83.49) 

                                                    Status: Active

 

                                        Family history of type 2 diabetes [facility] (V18.0, Z83.3) 

                                                    Status: Active







Social History





                    Name                Dates               Details

 

                                        -

                                                    Status: 







                    Name                Dates               Details

 

                    Former smoker                            

 

                    Former smoker                            







Vital Signs





                Date            Test            Result          Details

 

                                                                 

 

                    No Known Vitals to report                      







Results





                Date            Description     Value           Details

 

                    Results not documented                      

 

                                                                 







Plan of Care





                    Name                Dates               Details

 

                                        Planned Observations 

 

                    Planned Goals not documented                      

 

                                        Planned Encounters 

 

                                        Appointment; JONG LUND M.D.

                                        On: 2020 15:00



 

                                        Appointment; ADULT, HEMOPHILIA

                                        On: 3-Jeremy-2020 9:00









Instructions





                    Name                Dates               Details

 

                                        Instructions not documented

                                                     







Encounters





                                        Appointment; PABLO MOORE M.D. 

Encounter Diagnosis: Problem not documented

                                        On: 2018 13:30



 

                                        Appointment; [address] 

Encounter Diagnosis: Problem not documented

                                        On: 2018 14:00



 

                                        Appointment; PABLO MOORE M.D. 

Encounter Diagnosis: Problem not documented

                                        On: 2019 8:00



 

                                        Appointment; SALVADOR LOPEZ M.D . 

Encounter Diagnosis: Problem not documented

                                        On: 2019 9:00



 

                                        Appointment; ADULT, HEMOPHILIA 

Encounter Diagnosis: Problem not documented

                                        On: 18-Sep-2019 9:00



 

                                        Appointment; ADULT, HEMOPHILIA 

Encounter Diagnosis: Problem not documented

                                        On: 4-Dec-2019 9:00



 

                                        Appointment; JONG LUND M.D. 

Encounter Diagnosis: Problem not documented

                                        On: 2020 14:15



 

                                        Appointment; ADULT, HEMOPHILIA 

Encounter Diagnosis: Problem not documented

                                        On: 3-Richi-2020 9:00



 

                                        Appointment; ADULT, HEMOPHILIA 

Encounter Diagnosis: Problem not documented

                                        On: 2020 12:00

## 2020-05-24 NOTE — XMS REPORT
Summary of Care

                             Created on: 2020



WANDA ARITA

External Reference #: [id number]

: 1990

Sex: Female



Demographics





                          Address                   [address] [place]  [address]

 

                          Preferred Language        English

 

                          Marital Status            Unknown

 

                          Religion Affiliation     Unknown

 

                          Race                      White

 

                          Ethnic Group              Non-





Author





                          Author                    [facility]

 

                          Organization              [facility]

 

                          Address                   [address] [place]  [address]



 

                          Phone                     Unavailable







Care Team Providers





                    Care Team Member Name Role                Phone

 

                    SONIA ANTOINE,  FEROZ Unavailable         Unavailable

 

                    KAMALJIT BRANDT,  ALEJANDRO ARROYO  Unavailable         Unavailable

 

                    Jordan BRANDT,  Jef Unavailable         Unavailable

 

                    ASHELY BRANDT [facility],  JONG Unavailable         Unavailable

 

                    TERESA BRANDT,  PABLO WALKER Unavailable         Unavailable

 

                    KAILEY BRANDT,  JAMEY ARROYO Unavailable         Unavailable

 

                    Mark BRANDT,  Salvador Unavailable         Unavailable

 

                          Unavailable               Unavailable







Functional Status





                    Name                Dates               Details

 

                                        Functional status health issues are not 

documented

                                                    Status: 







                    Name                Dates               Details

 

                                        Cognitive status health issues are not d

ocumented

                                                    Status: 







Problems





                    Name                Dates               Details

 

                                        Constipation (564.00, K59.00) 

                                                    Status: Active

 

                                        Elevated blood pressure reading without 

diagnosis of hypertension (796.2, R03.0)



                                                    Status: Active

 

                                        Morbid obesity with body mass index (BMI

) of 45.0 to 49.9 in adult (278.01, 

E66.01) 

                                                    Status: Active

 

                                        Acid reflux (530.81, K21.9) 

                                                    Status: Active

 

                                        Menorrhagia with irregular cycle (626.2,

 N92.1) 

                                                    Status: Active

 

                                        Type 2B von Willebrand's disease (286.4,

 D68.0) 

                                                    Status: Active

 

                                        Former smoker (V15.82, Z87.891) 

                                                    Status: Active

 

                                        General counseling and advice for contra

ceptive management (V25.09, Z30.09) 

                                                    Status: Active

 

                                        Morbid obesity (278.01, E66.01) 

                                                    Status: Active

 

                                        Need for rubella vaccination (V04.3, Z23

) 

                                                    Status: Active

 

                                        Pelvic pain in female (625.9, R10.2) 

                                                    Status: Active

 

                                        Breast mass in female (611.72, N63.0) 

                                                    Status: Active

 

                                        Encounter for routine gynecological exam

ination with Papanicolaou smear of 

cervix (V72.31, Z01.419) 

                                                    Status: Active

 

                                        Encounter for blood test for routine gen

eral physical examination (V72.62, 

Z00.00) 

                                                    Status: Active

 

                                        Encounter for screening examination for 

sexually transmitted disease (V74.5, 

Z11.3) 

                                                    Status: Active

 

                                        Epistaxis (784.7, R04.0) 

                                                    Status: Active







Medications





                    Name                Dates               Details

 

                                        Tranexamic Acid 650 MG Oral Tablet

Take 2 tablets po every 8 hours starting on day 1 of menses for a total of 5 
days



 

                                         Quantity: 30









FEROZ SCOTT N.P. * 



                                         Start : 18-Sep-2019





Active

Iron 100 Plus 100-250-0.025-1 MG Oral Tablet

* 



                                         Refills: 0







* 



                                         Start : 2020





Active





Allergies and Adverse Reactions





                    Name                Dates               Details

 

                    Codeine Derivatives (Allergy)                     Status: Ac

tive



 

                    Zofran TABS (Allergy)                     Reaction: Rash, It

anival

Status: Active









Past Medical History





                    Name                Dates               Details

 

                                        History of Anemia complicating pregnancy

 (648.20, O99.019) 

                                                    Status: Resolved

 

                                        History of High-risk pregnancy supervisi

on (V23.9, O09.90) 

                                                    Status: Resolved

 

                                        History of hyperemesis gravidarum (V13.2

9, Z87.59) 

                                                    Status: Resolved

 

                                        History of Hypertension complicating pre

gnancy, unspecified trimester (642.90, 

O16.9) 

                                                    Status: Resolved

 

                                        History of MTHFR mutation (V12.3, Z15.89

) 

                                                    Status: Resolved

 

                                        History of nausea (V12.79, Z87.898) 

                                                    Status: Resolved

 

                                        History of Nexplanon insertion (V25.5, Z

30.017) 

                                                    Status: Resolved







Procedures





                    Procedure           Dates               Details

 

                    [QH] HIV AB, HIV 1/2, EIA, WITH REFLEXES Date: 2020   

  

 

                    [LH] TSH+Free T4    Date: 2020     

 

                    . [facility] - PAP      Date: 2020     

 

                    . [facility] - Affirm VPIII (BV Panel) Date: 2020     

 

                    US Pelvis with Pelvis Transvaginal 15112 Date: 2020   

  

 

                    CT Abdomen/Pelvis w/wo contrast 91247 Date: 2020     

 

                    US Breast  Unilat 49450 Date: 2020     

 

                    MA Digital Mammo DX Charlie  Date: 2020     

 

                    History of  Section                     Completed 



 

                    History of Tubal Ligation                     Completed 









Immunization





                    Name                Dates               Details

 

                                        Fluzone Quadrivalent 0.5 ML Intramuscula

r Suspension #1

Lot #: [id number]            on: 3-Sep-2014             

 

                                        Tdap (Adacel) #1

Lot #: [id number]            on: 3-Sep-2014             

 

                                        Tdap (Boostrix) #1

Lot #: [id number]            on: 2014            







Family History





                    Name                Dates               Details

 

                                        Family history of essential hypertension

 (V17.49, Z82.49) 

                                                    Status: Active

 

                                        Family history of thyroid disease (V18.1

9, Z83.49) 

                                                    Status: Active

 

                                        Family history of type 2 diabetes [facility] (V18.0, Z83.3) 

                                                    Status: Active







Social History





                    Name                Dates               Details

 

                                        -

                                                    Status: 







                    Name                Dates               Details

 

                    Former smoker                            

 

                    Former smoker                            







Vital Signs





                Date            Test            Result          Details

 

                                                                 

 

                    No Known Vitals to report                      







Results





                Date            Description     Value           Details

 

                    Results not documented                      

 

                                                                 







Plan of Care





                    Name                Dates               Details

 

                                        Planned Observations 

 

                    Planned Goals not documented                      

 

                                        Planned Encounters 

 

                                        Appointment; JOGN LUND M.D.

                                        On: 2020 13:30



 

                                        Appointment; ADULT, HEMOPHILIA

                                        On: 3-Jeremy-2020 9:00









Instructions





                    Name                Dates               Details

 

                                        Instructions not documented

                                                     







Encounters





                                        Appointment; PABLO MOORE M.D. 

Encounter Diagnosis: Problem not documented

                                        On: 2018 13:30



 

                                        Appointment; [address] 

Encounter Diagnosis: Problem not documented

                                        On: 2018 14:00



 

                                        Appointment; PABLO MOORE M.D. 

Encounter Diagnosis: Problem not documented

                                        On: 2019 8:00



 

                                        Appointment; SALVADOR LOPEZ M.D . 

Encounter Diagnosis: Problem not documented

                                        On: 2019 9:00



 

                                        Appointment; ADULT, HEMOPHILIA 

Encounter Diagnosis: Problem not documented

                                        On: 18-Sep-2019 9:00



 

                                        Appointment; ADULT, HEMOPHILIA 

Encounter Diagnosis: Problem not documented

                                        On: 4-Dec-2019 9:00



 

                                        Appointment; JONG LUND M.D. 

Encounter Diagnosis: Problem not documented

                                        On: 2020 14:15



 

                                        Appointment; ADULT, HEMOPHILIA 

Encounter Diagnosis: Problem not documented

                                        On: 3-Richi-2020 9:00



 

                                        Appointment; ADULT, HEMOPHILIA 

Encounter Diagnosis: Problem not documented

                                        On: 2020 12:00

## 2020-05-24 NOTE — XMS REPORT
Summary of Care

                             Created on: 2020



WANDA ARITA

External Reference #: [id number]

: 1990

Sex: Female



Demographics





                          Address                   [address] [place]  [address]

 

                          Preferred Language        English

 

                          Marital Status            Unknown

 

                          Scientologist Affiliation     Unknown

 

                          Race                      White

 

                          Ethnic Group              Non-





Author





                          Author                    [facility]

 

                          Organization              [facility]

 

                          Address                   [address] [place]  [address]



 

                          Phone                     Unavailable







Care Team Providers





                    Care Team Member Name Role                Phone

 

                    SONIA ANTOINE,  FEROZ Unavailable         Unavailable

 

                    KAMALJIT BRANDT,  ALEJANDRO ARROYO  Unavailable         Unavailable

 

                    Jordan BRANDT,  Jef Unavailable         Unavailable

 

                    ASHELY BRANDT [place],  JONG Unavailable         Unavailable

 

                    TERESA BRANDT,  PABLO WALKER Unavailable         Unavailable

 

                    KAILEY BRANDT,  JAMEY ARROYO Unavailable         Unavailable

 

                    Mark BRANDT,  Salvador Unavailable         Unavailable

 

                          Unavailable               Unavailable







Functional Status





                    Name                Dates               Details

 

                                        Functional status health issues are not 

documented

                                                    Status: 







                    Name                Dates               Details

 

                                        Cognitive status health issues are not d

ocumented

                                                    Status: 







Problems





                    Name                Dates               Details

 

                                        Need for rubella vaccination (V04.3, Z23

) 

                                                    Status: Active

 

                                        Constipation (564.00, K59.00) 

                                                    Status: Active

 

                                        Morbid obesity (278.01, E66.01) 

                                                    Status: Active

 

                                        General counseling and advice for contra

ceptive management (V25.09, Z30.09) 

                                                    Status: Active

 

                                        Elevated blood pressure reading without 

diagnosis of hypertension (796.2, R03.0)



                                                    Status: Active

 

                                        Morbid obesity with body mass index (BMI

) of 45.0 to 49.9 in adult (278.01, 

E66.01) 

                                                    Status: Active

 

                                        Acid reflux (530.81, K21.9) 

                                                    Status: Active

 

                                        Epistaxis (784.7, R04.0) 

                                                    Status: Active

 

                                        Menorrhagia with irregular cycle (626.2,

 N92.1) 

                                                    Status: Active

 

                                        Type 2B von Willebrand's disease (286.4,

 D68.0) 

                                                    Status: Active

 

                                        Former smoker (V15.82, Z87.891) 

                                                    Status: Active

 

                                        Encounter for screening examination for 

sexually transmitted disease (V74.5, 

Z11.3) 

                                                    Status: Active

 

                                        Encounter for blood test for routine gen

eral physical examination (V72.62, 

Z00.00) 

                                                    Status: Active

 

                                        Encounter for routine gynecological exam

ination with Papanicolaou smear of 

cervix (V72.31, Z01.419) 

                                                    Status: Active

 

                                        Breast mass in female (611.72, N63.0) 

                                                    Status: Active

 

                                        Pelvic pain in female (625.9, R10.2) 

                                                    Status: Active







Medications





                    Name                Dates               Details

 

                                        Iron 100 Plus 100-250-0.025-1 MG Oral Ta

blet



 









* 



                                         Start : 2020





Active

Tranexamic Acid 650 MG Oral Tablet

Take 2 tablets po every 8 hours starting on day 1 of menses for a total of 5 day
s

* 



                           Quantity: 30              Refills: 2









SCOTT N.PGilmar, FEROZ * 



                                         Start : 18-Sep-2019





Active





Allergies and Adverse Reactions





                    Name                Dates               Details

 

                    Codeine Derivatives (Allergy)                     Status: Ac

tive



 

                    Zofran TABS (Allergy)                     Reaction: Rash, It

anival

Status: Active









Past Medical History





                    Name                Dates               Details

 

                                        History of Anemia complicating pregnancy

 (648.20, O99.019) 

                                                    Status: Resolved

 

                                        History of High-risk pregnancy supervisi

on (V23.9, O09.90) 

                                                    Status: Resolved

 

                                        History of hyperemesis gravidarum (V13.2

9, Z87.59) 

                                                    Status: Resolved

 

                                        History of Hypertension complicating pre

gnancy, unspecified trimester (642.90, 

O16.9) 

                                                    Status: Resolved

 

                                        History of MTHFR mutation (V12.3, Z15.89

) 

                                                    Status: Resolved

 

                                        History of nausea (V12.79, Z87.898) 

                                                    Status: Resolved

 

                                        History of Nexplanon insertion (V25.5, Z

30.017) 

                                                    Status: Resolved







Procedures





                    Procedure           Dates               Details

 

                    . [facility] - PAP      Date: 2020     

 

                    . [facility] - Affirm VPIII (BV Panel) Date: 2020     

 

                    [LH] TSH+Free T4    Date: 2020     

 

                    [QH] HIV AB, HIV 1/2, EIA, WITH REFLEXES Date: 2020   

  

 

                    US Breast  Unilat 12162 Date: 2020     

 

                    CT Abdomen/Pelvis w/wo contrast 54155 Date: 2020     

 

                    MA Digital Mammo DX Charlie  Date: 2020     

 

                    US Pelvis with Pelvis Transvaginal 50056 Date: 2020   

  

 

                    History of  Section                     Completed 



 

                    History of Tubal Ligation                     Completed 









Immunization





                    Name                Dates               Details

 

                                        Tdap (Adacel) #1

Lot #: [id number]            on: 3-Sep-2014             

 

                                        Fluzone Quadrivalent 0.5 ML Intramuscula

r Suspension #1

Lot #: [id number]            on: 3-Sep-2014             

 

                                        Tdap (Boostrix) #1

Lot #: [id number]            on: 2014            







Family History





                    Name                Dates               Details

 

                                        Family history of essential hypertension

 (V17.49, Z82.49) 

                                                    Status: Active

 

                                        Family history of thyroid disease (V18.1

9, Z83.49) 

                                                    Status: Active

 

                                        Family history of type 2 diabetes [facility]

us (V18.0, Z83.3) 

                                                    Status: Active







Social History





                    Name                Dates               Details

 

                                        -

                                                    Status: 







                    Name                Dates               Details

 

                    Former smoker                            

 

                    Former smoker                            







Vital Signs





                Date            Test            Result          Details

 

                                                                 

 

                                        :55

                    BP Systolic         137 mm[Hg]          Status: Comments: Lo

cation: RUE; Position: Sitting



 

                    BP Diastolic        86 mm[Hg]           Status: Comments: Lo

cation: RUE; Position: Sitting



 

                    Height              70 in               Status: 



 

                    Weight              333.375 lb          Status: 



 

                    Body Mass Index Calculated 47.83 kg/m2         Status: 



 

                    Body Surface Area Calculated 2.59 m2             Status: 



 

                    Temperature         98.3 f              Status: Comments: Me

thod: Oral



 

                    Heart Rate          76 /min             Status: 









Results





                Date            Description     Value           Details

 

                    :05     [[facility]] LIPID PANEL    

 

                                CHOLESTEROL, TOTAL 184  mg/dl (Normal) Range: <2

00





 

                                HDL CHOLESTEROL 47  mg/dl (Below low threshold) 

Range: >50





 

                                TRIGLYCERIDES   155  mg/dl (Above high threshold

) Range: <150





 

                                LDL-CHOLESTEROL 110  {MG/DL__CAL} (Above high th

reshold) Comments: Reference 

range: <100 Desirable range <100 mg/dL for primary prevention;  <70 mg/dL for 
patients with CHD or diabetic patients with > or = 2 CHD risk factors. LDL-C is 
now calculated using the Derek-Chencho calculation, which is a validated novel 
method providing better accuracy than the Friedewald equation in the estimation 
of LDL-C. Derek TIRADO et al. DAVE. 2013;310(19): 0351-8536 (http
://education.[employer]/faq/KJQ103)



 

                                CHOL/HDLC RATIO 3.9  {CALC} (Normal) Range: <5.0





 

                                NON HDL CHOLESTEROL 137  {MG/DL__CAL} (Above hig

h threshold) Range: <130

Comments: For patients with diabetes plus 1 major ASCVD risk factor, treating to
 a non-HDL-C goal of <100 mg/dL (LDL-C of <70 mg/dL) is considered a therapeutic
 option.



 

                          :05           [Q] HIV-1/2 Antigen and Anti

bodies, Fourth Generation, with 

Reflexes                                 

 

                                HIV AG/AB, 4TH GEN NON-REACTIVE   (Normal) Range

: NON-REACTIVE

Comments: HIV-1 antigen and HIV-1/HIV-2 antibodies were notdetected. There is no
 laboratory evidence of HIVinfection. PLEASE NOTE: This information has been 
disclosed toyou from records whose confidentiality may beprotected by state law.
  If your state requires suchprotection, then the state law prohibits you 
frommaking any further disclosure of the informationwithout the specific written
 consent of the personto whom it pertains, or as otherwise permitted by law.A 
general authorization for the release of medical orother information is NOT 
sufficient for this purpose.  For additional information please refer t
Optracep://education.[employer]/faq/DDZ091(This link is being provided 
for informational/educational purposes only.)  The performance of this assay has
 not been clinicallyvalidated in patients less than 2 years old.



 

                    :05     [QL] CMP W/EGFR     

 

                                GLUCOSE         84  mg/dl (Normal) Range: 65-99

Comments: Fasting reference interval



 

                                UREA NITROGEN (BUN) 12  mg/dl (Normal) Range: 7-

25





 

                                CREATININE      0.68  mg/dl (Normal) Range: 0.50

-1.10





 

                                eGFR NON- 117  {ML/MIN/1.7} (Nor

mal) Range: > OR = 60





 

                                eGFR  135  {ML/MIN/1.7} (Normal)

 Range: > OR = 60





 

                                BUN/CREATININE RATIO NOT APPLICABLE  {CALC} Rang

e: 6-22





 

                                SODIUM          141  mmol/L (Normal) Range: 135-

146





 

                                POTASSIUM       3.9  mmol/L (Normal) Range: 3.5-

5.3





 

                                CHLORIDE        104  mmol/L (Normal) Range: 98-1

10





 

                                CARBON DIOXIDE  25  mmol/L (Normal) Range: 20-32





 

                                CALCIUM         9.3  mg/dl (Normal) Range: 8.6-1

0.2





 

                                PROTEIN, TOTAL  7.6  g/dl (Normal) Range: 6.1-8.

1





 

                                ALBUMIN         3.9  g/dl (Normal) Range: 3.6-5.

1





 

                                GLOBULIN        3.7  {G/DL__CALC} (Normal) Range

: 1.9-3.7





 

                                ALBUMIN/GLOBULIN RATIO 1.1  {CALC} (Normal) Rang

e: 1.0-2.5





 

                                BILIRUBIN, TOTAL 0.7  mg/dl (Normal) Range: 0.2-

1.2





 

                                ALKALINE PHSPHATASE 77  u/l (Normal) Range: 33-1

15





 

                                AST             54  u/l (Above high threshold) R

michaela: 10-30





 

                                ALT             47  u/l (Above high threshold) R

michaela: 6-29





 

                    :05     [QL] CBC (INCLUDES DIFF/PLT)   

 

                                WHITE BLOOD CELL COUNT 8.1  {Thousand/u} (Normal

) Range: 3.8-10.8





 

                                RED BLOOD CELL COUNT 4.66  {Million/uL} (Normal)

 Range: 3.80-5.10





 

                                HEMAGLOBIN      11.9  g/dl (Normal) Range: 11.7-

15.5





 

                                HEMATOCRIT      37.7  % (Normal) Range: 35.0-45.

0





 

                                MCV             80.9  fL (Normal) Range: 80.0-10

0.0





 

                                MCH             25.5  pg (Below low threshold) R

michaela: 27.0-33.0





 

                                MCHC            31.6  g/dl (Below low threshold)

 Range: 32.0-36.0





 

                                RDW             14.5  % (Normal) Range: 11.0-15.

0





 

                                PLATELET COUNT  283  {Thousand/u} (Normal) Range

: 140-400





 

                                MPV             11.4  fL (Normal) Range: 7.5-12.

5





 

                                ABSOLUTE NEUTROPHILS 4957  {cells/uL} (Normal) R

michaela: 1248-5922





 

                                ABSOLUTE LYMPHOCYTES 2252  {cells/uL} (Normal) R

michaela: 850-3900





 

                                ABSOLUTE MONOCYTES 510  {cells/uL} (Normal) Rang

e: 200-950





 

                                ABSOLUTE EOSINOPHILS 332  {cells/uL} (Normal) Ra

nge: 





 

                                ABSOLUTE BASOPHILS 49  {cells/uL} (Normal) Range

: 0-200





 

                                        NEUTROPHILS         61.2  % (Normal)  

 

                                        LYMPHOCYTES         27.8  % (Normal)  

 

                                        MONOCYTES           6.3  % (Normal)  

 

                                        EOSINOPHILS         4.1  % (Normal)  

 

                                        BASOPHILS           0.6  % (Normal)  

 

                    :05     [QH] HEPATITIS B SURFACE ANTIGEN W/REFL 

CONFIRM   

 

                                HEPATITIS B SURFACE ANTIGEN NON-REACTIVE   (Norm

al) Range: NON-REACTIVE





 

                    :05     [QL] HEPATITIS C ANTIBODY   

 

                                HEPATITIS C ANTIBODY NON-REACTIVE   (Normal) Ran

ge: NON-REACTIVE





 

                                SIGNAL TO CUT-OFF 0.01   (Normal) Range: <1.00

Comments: HCV antibody was non-reactive. There is no laboratory evidence of HCV 
infection. In most cases, no further action is required. However,if recent HCV 
exposure is suspected, a test for HCV RNA(test code 76440) is suggested. For 
additional information please refer 
tohttp://[employer]/faq/IOY37c4(This link is being provided 
for informational/educational purposes only.)



 

                    :05     [[facility]] T4, FREE       

 

                                T4, FREE        1.1  ng/dl (Normal) Range: 0.8-1

.8





 

                    :05     [[facility]] TSH, 3RD GENERATION   

 

                                TSH             1.71  {MIU/L} (Normal) Comments:

 Reference Range                     > or 

= 20 Years  0.40-4.50                          Pregnancy Ranges          First 
trimester    0.26-2.66          Second trimester   0.55-2.73          Third 
trimester    0.43-2.91



 

                    :05     [[facility]] HEMOGLOBIN A1c   

 

                                HEMOGLOBIN A1c  5.9  {%_of_total} (Above high th

reshold) Range: <5.7

Comments: For someone without known diabetes, a hemoglobin A1c value between 
5.7% and 6.4% is consistent withprediabetes and should be confirmed with a 
follow-up test. For someone with known diabetes, a value &lt;7%indicates that 
their diabetes is well controlled. [id number] should be individualized based on 
duration ofdiabetes, age, comorbid conditions, and otherconsiderations. This 
assay result is consistent with an increased riskof diabetes. Currently, no 
consensus exists regarding use ofhemoglobin A1c for diagnosis of diabetes for 
children.



 

                    :05     [[facility]] RPR            

 

                                RPR (MONITOR) W/REFL TITER (REFL) NON-REACTIVE  

 (Normal) Range: NON-REACTIVE











Plan of Care





                    Name                Dates               Details

 

                                        Planned Observations 

 

                    Planned Goals not documented                      

 

                                        Planned Encounters 

 

                                        Appointment; JONG LUND M.D.

                                        On: 2020 15:00



 

                                        Appointment; ADULT, HEMOPHILIA

                                        On: 3-Jeremy-2020 9:00









Instructions





                    Name                Dates               Details

 

                                        Instructions not documented

                                                     







Encounters





                                        Appointment; PABLO MOORE M.D. 

Encounter Diagnosis: Problem not documented

                                        On: 2018 13:30



 

                                        Appointment; [address] 

Encounter Diagnosis: Problem not documented

                                        On: 2018 14:00



 

                                        Appointment; PABLO MOORE M.D. 

Encounter Diagnosis: Problem not documented

                                        On: 2019 8:00



 

                                        Appointment; SALVADOR LOPEZ M.D . 

Encounter Diagnosis: Problem not documented

                                        On: 2019 9:00



 

                                        Appointment; ADULT, HEMOPHILIA 

Encounter Diagnosis: Problem not documented

                                        On: 18-Sep-2019 9:00



 

                                        Appointment; ADULT, HEMOPHILIA 

Encounter Diagnosis: Problem not documented

                                        On: 4-Dec-2019 9:00



 

                                        Appointment; JONG LUND M.D. 

Encounter Diagnosis: Problem not documented

                                        On: 2020 14:15



 

                                        Appointment; ADULT, HEMOPHILIA 

Encounter Diagnosis: Problem not documented

                                        On: 3-Richi-2020 9:00



 

                                        Appointment; ADULT, HEMOPHILIA 

Encounter Diagnosis: Problem not documented

                                        On: 2020 12:00

## 2020-05-24 NOTE — XMS REPORT
Summary of Care: 20 - 20

                             Created on: 2065



WANDA ARITA

External Reference #: [id number]

: 1990

Sex: Female



Demographics





                          Address                   [address]

[place]  [address]

 

                          Home Phone                [phone]

 

                          Preferred Language        English

 

                          Marital Status            

 

                          Sikh Affiliation     Yazidism

 

                          Race                      White

 

                                        Additional Race(s)  

 

                          Ethnic Group              /Latin





Author





                          Author                    [facility]

[place]

 

                          [facility]

[place]

 

                          Address                   Unknown

 

                          Phone                     Unavailable







Encounter





MANDY Alonso(FIN) [id number] Date(s): 20 - 20

[facility] [place] [address] [place] [phone]
[phone]

Discharge Disposition: Home or Self Care

Attending Physician: Shayy Teran MD

Referring Physician: Shayy Teran MD





Vital Signs





No data available for this section



Problem List





    



              Condition    Effective Dates  Status       Health Status  Informan

t

 

    



                           Anemia(Confirmed)         Resolved  

 

    



                           Coagulopathy(Confirm      Active  



                                         ed)    

 

    



                           Morbid                    Active  



                                         obesity(Confirmed)    

 

    



                           Pre-eclampsia(Confir      Active  



                                         med)    

 

    



                     Pre-eclampsia(Confir  2014                Resolved  



                                         med)    

 

    



                     Pregnant(Confirmed)  10/3/14 - 1/11/15   Resolved  

 

    



                     Pregnant(Confirmed)  04 - 05   Resolved  

 

    



                     Pregnant(Confirmed)  06 - 3/29/07    Resolved  







Allergies, Adverse Reactions, Alerts





   



                 Substance       Reaction        Severity        Status

 

   



                           codeine                   Active

 

   



                           Zofran                    Active







Medications





No data available for this section



Results





No data available for this section



Immunizations





Given and Recorded



   



                 Vaccine         Date            Status          Refusal Reason

 

   



                     measles/mumps/rubella virus vaccine  1/12/15             Gi

dorian 







Procedures





    



              Procedure    Date         Related Diagnosis  Body Site    Status

 

    



                            section          Completed

 

    



                           Tubal ligation            Completed







Social History





 



                           Social History Type       Response

 

 



                           Sexual                    Sexually active: Yes.  Part

ner with STD? No.  History of sexual abuse: 

No.

 

 



                           Smoking Status            Never smoker; Type: Cigaret

kel; Exposure to Tobacco Smoke None;

Cigarette



                                         Smoking Last 365 Days No; Reg Smoking C

essation Counseling No



                                         entered on: 19







Assessment and Plan





No data available for this section

## 2020-05-24 NOTE — XMS REPORT
Continuity of Care Document

                             Created on: 2020



WANDA MOBLEY

External Reference #: [id number]

: 1990

Sex: Female



Demographics





                          Address                   [address]

[place] [address]

 

                          Home Phone                [phone]

 

                          Preferred Language        English

 

                          Marital Status            Unknown

 

                          Temple Affiliation     Unknown

 

                          Race                      Unknown

 

                          Ethnic Group              Unknown





Author





                          Author                    [employer] Burpple, WANDA

 

                          Organization              [facility]

 

                          Address                   Unknown

 

                          Phone                     Unavailable







Care Team Providers





                    Care Team Member Name Role                Phone

 

                    [facility] Unavailable         Un

available



                                    



Problems

                    



                    Problem                         Status                      

   Onset Date       

                          Classification                         Date Reported  

         

                          Comments                         Source               

     

 

                    LABS                         Active                         

2019          

                                                                                

      

                                        [facility]                 

   

 

                    ANEMIA                         Active                       

  2019        

                                                                                

    

                                        [facility]                 

   

 

                          Gastro-esophageal reflux disease with esophagitis     

                          

                    2019                                                   [facility]   

 

               

 

                    01548, REFLUX                         Active                

         2018 

                                                                                

                                        [facility]                    

 

                    CHILDBIRTH                         Active                   

      10[birth year]2014    

                                                                                

                                        [facility]                 

   

 

                    INDUCTION                         Active                    

     10/30/2014     

                                                                                

 

                                        [facility]                 

   

 

                    FALL                         Active                         

10/03/2014          

                                                                                

      

                                        [facility]                 

   

 

                          Patient currently pregnant (finding)                  

       Resolved           

                    10/03/2014                         Problem                  

       

2020                                                   [facility]                    

 

                    [address]                         Active                     

    2014      

                                                                                

  

                                        [facility]                 

   

 

                    ABDOMINAL PAIN                         Active               

          2014

                                                                                

                                        [facility]                 

   

 

                    Discharge Diagnosis: Pregnancy                              

                    

2014         

 

                                                    [facility]     

             

 

 

                          8 WEEKS PREGNANT/HYPERTENSIVE                         

Active                    

                    2014                                                  

                

                                                    [facility]     

               

 

                          Unspecified chronic gastritis without bleeding        

                          

                                                                      2019

     

                                                    [facility]            

        

 

                          Morbid (severe) obesity due to excess calories        

                          

                                                                      2019

     

                                                    [facility]            

        

 

                    Anemia (disorder)                         Resolved          

                    

                          Problem                         2020            

       

                                                    [facility]                    

 

                          Blood coagulation disorder (disorder)                 

        Active            

                                             Problem                         2020 

                                                    [facility]                    

 

                          Morbid obesity (disorder)                         Acti

ve                        

                                             Problem                         2020             

                                                    [facility]                    

 

                    Pre-eclampsia (disorder)                         Active     

                    

                          Problem                         2020            

  

                                                    [facility]                    

 

                    VOMITING ALONE                         Active               

                    

                                                                                

      

                                        [facility]                 

   

 

                    DECREASED FETAL MOVEMENT                         Active     

                    

                                                                                

                                        [facility]                 

   

 

                    PROLONGED PREG-UNSP                         Active          

                    

                                                                                

 

                                        [facility]                 

   



                                                                                
                                                                                
                                                                                
                                                                                
                                                                    



Medications

                    



                    Medication                         Details                  

       Route        

                          Status                         Patient Instructions   

          

                          Ordering Provider                         Order Date  

               

                                        Source                    

 

                          ferrous sulfate 325 MG Oral Tablet                    

     325 mg = 1 tab, PO, 

Every Other Day, Take 30 minutes prior to meal, # 15 tab, 0 Refill(s), other    
                                             Active                             

                                             2019                         

[facility]                    

 

                          ferrous sulfate 325 mg oral enteric coated tablet     

                    325 mg

= 1 tab, PO, Daily, 0 Refill(s)                                                 

                    Inactive                                                    

                    

                          2019                         [facility]

nter                    

 

                          Hydrocortisone-Aloe 0.5% topical cream                

         1 appl, Route: 

TOP, BID, Drug form: CRM, Start date: 01/13/15 9:00:00, Duration: 30 day, Stop 
date: 02/11/15 17:00:00                                                   

Inactive                                                                        

 

                          2015                         [facility]

nter                    

 

                          Prenatal Multivitamins oral tablet                    

     1 tab, Route: PO, 

Drug Form: TAB, Dosing Weight 147.273, kg, Daily, Start date: 01/12/15 9:00:00, 
Duration: 30 day, Stop date: 02/10/15 9:00:00                                   

                    No Longer Active                                            

      

                          2015                         [facility]                    

 

                          Docusate Sodium 100 MG Oral Capsule [Colace]          

               100 mg = 1 

cap, PO, BID, Constipation, # 20 cap, 0 Refill(s)                               

                    Active                                                      

 

                          2015                         [facility]

nter  

                  

 

                          ferrous sulfate 325 mg oral enteric coated tablet     

                    325 mg

 = 1 tab, PO, Daily, # 30 tab, 0 Refill(s)                                      

                    Active                                                      

        

                          2015                         [facility]

nter         

           

 

                          ibuprofen 800 mg oral tablet                         8

00 mg = 1 tab, PO, Q8H, 

Pain Score 6-10, # 30 tab, 0 Refill(s)                                          

                    Active                                                      

            

                          2015                         [facility]

nter             

       

 

                                        Atropine Sulfate 0.025 MG / Diphenoxylat

e Hydrochloride 2.5 MG Oral Tablet 

[Lomotil]                               2 tab, PO, QID, Loose Stools, 0 Refill(s

)     

                                             Active                             

                                                    2015                  

      

                                        [facility]                 

   

 

                                        Atropine Sulfate 0.025 MG / Diphenoxylat

e Hydrochloride 2.5 MG Oral Tablet 

[Lomotil]                               Notes: (Same As: Lomotil) MAX Adult dose

 = 8 

tabs/day                                                   No Longer Active     

 

                                                                      2015

 

                                        [facility]                 

   

 

                          M-M-R II                         Notes: (Same as: M-M-

R II)  

(measles-mumps-rubella virus vaccine 0.5 ml INJ VL)  GIVE PRIOR TO DISCHARGE    
                                                    No Longer Active            

      

                                                                      2015

             

                                        [facility]                 

   

 

                          Acetaminophen                         Notes: Do not ex

ceed 4 gm/day.  (Same as: 

Tylenol)                                                   No Longer Active     

 

                                                                      2015

 

                                        [facility]                 

   

 

                          Ibuprofen                         Notes: (Same as: Mot

rin) "Do Not Crush"  Take 

with food.                                                   No Longer Active   

 

                                                                       

2015                              [facility]                 

   

 

                          Bisacodyl                         Notes: (Same As: Dul

colax, Correctol) (Do Not 

Crush)  "Do Not Crush"                                                   No 

Longer Active                                                                   

 

                          2015                         [facility]

nter              

      

 

                          lanolin topical                         Notes: (Same a

s:Lanolin)                

                                             No Longer Active                   

           

                                             2015                         

[facility]                    

 

                          zolpidem                         Notes: (Same As: Ambi

en)                       

                                             No Longer Active                   

                  

                                             2015                         

[facility]                    

 

                          Benzocaine 200 MG/ML Topical Spray [Dermoplast]       

                  Notes: 

(Same As: Dermoplast)  FOR EXTERNAL USE ONLY                                    

                    No Longer Active                                            

      

                          2015                         [facility]                    

 

                          Docusate                         Notes: (Same as: Cola

ce) (Do Not Crush)        

                                                    No Longer Active            

          

                                                                      2015

                 

                                        [facility]                 

   

 

                          Methylergonovine                         Notes: (Same 

as:Methergine)            

                                             No Longer Active                   

       

                                                    2015                  

   

                                        [facility]                 

   

 

                          Oxytocin 0.03 UNT/ML Injectable Solution              

           Notes: (Same 

as: OXYTOCIN-D5LR)                                                   No Longer 

Active                                                                          

 

                          2015                         [facility]

nter                    

 

                          Lactated Ringers IV 1,000 mL                         1

,000 mL, Rate: 100 ml/hr, 

Infuse over: 10 hr, Route: IV, Dosing Weight 147.273 kg, Total Volume: 1,000, 
Start date: 01/11/15 17:22:00, Duration: 30 day, Stop date: 02/10/15 17:21:00   
                                                    No Longer Active            

     

                                                                      2015

            

                                        [facility]                 

   

 

                          Oxytocin 0.03 UNT/ML Injectable Solution              

           Notes: (Same 

as: OXYTOCIN-D5LR)                                                   Inactive   

 

                                                                       

2015                              [facility]                 

   

 

                          Remove - dinoprostone (Cervidil) insert               

          Notes: Vaginal 

insert: to be removed 1 hour prior to oxytocin administration or 12 hours after 
insertion.                                                   Inactive           

 

                                                                      2015

       

                                        [facility]                 

   

 

                          Cervidil                         Notes: (Same as: Cerv

stanley)                     

                                             Inactive                           

                

                                             2015                         

[facility]                    

 

                          Carboprost                         Notes: (Same As: He

mabate)                   

                                             No Longer Active                   

              

                                             2015                         

[facility]                    

 

                          Citric Acid / sodium citrate                         N

otes: (Same As: Bicitra, 

Cytra-2) Sodium citrate-citric acid (500-334 mg/5 mL): 1 mL contains sodium 1 
mEq/mL and bicarbonate 1 mEq/mL                                                 

                    No Longer Active                                            

                   

                          2015                         [facility]

nter          

          

 

                          Misoprostol                         Notes: (Same as:Cy

totec)   Take with food   

                                                    No Longer Active            

     

                                                                      2015

            

                                        [facility]                 

   

 

                          Methylergonovine                         Notes: (Same 

as:Methergine)            

                                             No Longer Active                   

       

                                                    2015                  

   

                                        [facility]                 

   

 

                          Famotidine                         Notes: (Same as: Pe

pcid) Can be dilute in 5-

10cc NS  IVP: Slow IV push over at least 2 minutes.                             

                          No Longer Active                                      

     

                                             2015                         

[facility]                    

 

                          Butorphanol                         Notes: (Same As: S

tadol)                    

                                             No Longer Active                   

               

                                             2015                         

[facility]                    

 

                          Terbutaline                         Notes: **DO NOT US

E IN OB/GYN AREA**  (Same 

As: Brethine)                                                   No Longer Active

 

                                                                          

2015                              [facility]                 

   

 

                          Lidocaine Hydrochloride 10 MG/ML Injectable Solution  

                       

Notes: (Same as: Xylocaine)                                                   No

 

Longer Active                                                                   

 

                          2015                         [facility]

nter              

      

 

                          Ondansetron                         Notes: (Same as: LEANN shabazz)                    

                                             No Longer Active                   

               

                                             2015                         

[facility]                    

 

                                        Calcium Chloride 0.0014 MEQ/ML / Potassi

um Chloride 0.004 MEQ/ML / Sodium 

Chloride 0.103 MEQ/ML / Sodium Lactate 0.028 MEQ/ML Injectable Solution         
                                        1,000 mL, 1,000 ml/hr, Infuse Over: 1 hr

, Route: IV, 1,000, Drug

 form: INJ, ONCE, Dosing Weight 147.273 kg, Start date: 01/10/15 22:13:00, Stop 
date: 01/10/15 22:13:00, Bolus for regional anesthesia per unit protocol        
                                                    No Longer Active            

          

                                                                      2015

                 

                                        [facility]                 

   

 

                          Lactated Ringers IV 1,000 mL                         1

,000 mL, Rate: 125 ml/hr, 

Infuse over: 8 hr, Route: IV, Dosing Weight 147.273 kg, Total Volume: 1,000, 
Start date: 01/10/15 22:13:00, Duration: 30 day, Stop date: 02/09/15 22:12:00   
                                                    No Longer Active            

     

                                                                      2015

            

                                        [facility]                 

   

 

                          Oxytocin 0.03 UNT/ML Injectable Solution              

           Notes: (Same 

as: OXYTOCIN-D5LR)                                                   No Longer 

Active                                                                          

 

                          2015                         [facility]

nter                    

 

                          Tylenol                         Notes: Do not exceed 4

 gm/day.  (Same as: 

Tylenol)                                                   Inactive             

 

                                                                      10/03/2014

         

                                        [facility]                 

   

 

                          Vitamin B6 25 mg oral tablet                         2

5 mg = 1 tab, PO, Q6H, # 

30 tab, 0 Refill(s)                                                   Active    

 

                                                                      2014

                                        [facility]                 

   

 

                          Metoclopramide 10 MG Oral Tablet [Reglan]             

            10 mg, PO, 

QID-Before Meals, nausea and vomiting, # 40 tab, 0 Refill(s)                    
                                             Active                             

               

                                             2014                         

[facility]                    

 

                                        Promethazine Hydrochloride 25 MG Oral Ta

blet [Phenergan]                        

                          25 mg = 1 tab, PO, Q6H, Nausea, # 15 tab, 0 Refill(s) 

                         

                        Active                                                  

                          2014                         [facility]                    

 

                          Famotidine 20 MG Oral Tablet [Pepcid]                 

        20 mg = 1 tab, PO,

 BID, # 60 tab, 0 Refill(s)                                                   

Active                                                                          

 

                          2014                         [facility]

nter                    

 

                          Prenatal 1 Plus 1 oral tablet                         

1 tab, PO, Daily, # 100 

tab, 0 Refill(s)                                                   Active       

 

                                                                      2014

   

                                        [facility]                 

   

 

                          Loperamide                         Notes: (Same as: Im

odium)  MAX adult dose is 

8 caps/day                                                   Inactive           

 

                                                                      2014

       

                                        [facility]                 

   

 

                                        Ascorbic Acid / Calcium Carbonate / Deven

er Sulfate / Ferrous fumarate / Folic 

Acid / Magnesium Oxide / Niacinamide / Potassium Iodide / pyridoxine / 
Riboflavin / Thiamine / Vitamin A / Vitamin B 12 / Vitamin D / Vitamin E / Zinc 
Sulfate                                 1 tab, Route: PO, Drug Form: TAB, Dosing

 Weight 

136.364, kg, Daily, Start date: 14 9:00:00, Duration: 30 day, Stop date: 
14 9:00:00                                                   No Longer 

Active                                                                          

 

                          2014                         [facility]

nter                    

 

                          Pepcid                         Notes: (Same as: Pepcid

) Can be dilute in 5-10cc 

NS  IVP: Slow IV push over at least 2 minutes.                                  

                          No Longer Active                                      

          

                                             2014                         

[facility]                    

 

                                        Calcium Chloride 0.0014 MEQ/ML / Potassi

um Chloride 0.004 MEQ/ML / Sodium 

Chloride 0.103 MEQ/ML / Sodium Lactate 0.028 MEQ/ML Injectable Solution         
                                        1,000 mL, 1,000 ml/hr, Infuse Over: 1 hr

, Route: IV, 1,000, Drug

 form: INJ, ONCE, Priority: STAT, Dosing Weight 140 kg, Start date: 14 
6:10:00, Duration: 1 doses or times, Stop date: 14 6:10:00                
                                             Inactive                           

           

                                             2014                         

[facility]                    

 

                          Vitamin B6                         Notes: (Same as: Vi

tamin B6)                 

                                             No Longer Active                   

            

                                             2014                         

[facility]                    

 

                    Reglan                         Notes: (Same as: Reglan)     

                    

                          No Longer Active                                      

 

                                             2014                         

[facility]                    

 

                          Phenergan                         Notes: Do not give I

V push.  (Same as: 

Phenergan)                                                   No Longer Active   

 

                                                                       

2014                              [facility]                 

   

 

                          D5LR 1,000 mL                         1,000 mL, Rate: 

125 ml/hr, Infuse over: 8 

hr, Route: IV, Dosing Weight 136.364 kg, Total Volume: 1,000, Start date: 
14 19:13:00, Duration: 30 day, Stop date: 14 19:12:00               
                                             No Longer Active                   

          

                                                    2014                  

      

                                        [facility]                 

   

 

                          Tylenol                         650 mg, Route: PO, Agustín

g form: TAB, ONCE, Dosing 

Weight 120.909, kg, Priority: STAT, Start date: 14 20:57:00, Stop date: 
14 20:57:00                                                   Inactive    

 

                                                                      2014

                                        [facility]                 

   



                                                                                
                                                                                
                                                                                
                                                                                
                                                                                
                                                                                
                                                                                
                                                                                
                                                                                
                                                                                
                                                                    



Allergies, Adverse Reactions, Alerts

                    



                    Substance                         Category                  

       Reaction     

                          Severity                         Reaction type        

      

                    Status                         Date Reported                

         

Comments                                Source                    

 

                    codeine                         Assertion                   

                    

                                             Drug allergy                       

  Active 

                                                                           

[facility]                    

 

                    Zofran                         Assertion                    

                    

                                             Drug allergy                       

  Active  

                                                                          [facility]                    



                                                                



Immunizations

                    



                    Immunization                         Date Given             

            Site    

                          Status                         Last Updated           

     

                          Comments                         Source               

     

 

                          measles/mumps/rubella virus vaccine                   

      2015          

                          Right Upper Arm                         completed     

           

                    [place]                                                  [facility], [facility]                    



                                            



Results





                    Order Name                         Results                  

       Value        

                          Reference Range                         Date          

         

                    Interpretation                         Comments             

            

Source                    

 

                HEMATOLOGY                         vWF Antigen     35           

              45 - 

165                         2019                                          

                                                    [facility]     

               

 

                HEMATOLOGY                         vWF Assay       30           

              45 - 140

                          2019                                            

 

                                                    [facility]     

               

 

                    HEMATOLOGY                         vWF Multimers       Comme

nt                        

                                             2019                         

                   

                                        Result Comment: Multimer analysis is abn

ormal with loss of a subset of 

the<br/>high molecular weight multimers, specifically only the very<br/>highest 
molecular weight multimers. This may occur in type<br/>2B VWD, platelet-type von
 Willebrand disease, in certain<br/>forms of acquired von Willebrand  syndrome 
(AVWS), or due<br/>to preanalytic variables. This pattern is distinct 
from<br/>that typically seen in type 2B or platelet-type VWD, as<br/>loss of 
only the very highest molecular weight multimers<br/>is evident (versus loss of 
all high molecular weight<br/>multimers). AVWS may have this pattern of multimer
 loss<br/>(even with a normal VWF activity to VWF antigen ratio) and<br/>may 
manifest as bleeding due to gastrointestinal<br/>angiodysplasia. Correlation 
with clinical symptoms and with<br/>plasma VWF levels is recommended. Improper 
pre-analytical<br/>sample handling, specifically the storage of whole blood 
at<br/>refrigerated temperatures, may cause spurious decrease of<br/>VWF levels 
resulting in an abnormal multimer pattern.<br/>Repeat analysis ensuring proper 
specimen handling is<br/>recommended before a diagnosis is made (centrifuge 
to<br/>create platelet poor citrated plasma; freeze immediately<br/>and maintain
 frozen until tested).<br/>Performed At: [facility] [place]<br/>[address] [place], [place] [id number]<br/>Osito Villalobos MD Ph:[phone]         
                                        [facility]                 

   

 

                HEMATOLOGY                         Factor IX       170          

               73 - 

184                         2019                                          

                                                    [facility]     

               

 

                HEMATOLOGY                         Factor XI       131          

               58 - 

135                         2019                                          

                                                    [facility]     

               

 

                HEMATOLOGY                         Factor XII      143          

               46 - 

196                         2019                                          

                                                    [facility]     

               

 

                HEMATOLOGY                         INR             1.03         

                0.85 - 1.17 

                          2019                                            

  

                                                    [facility]     

               

 

                HEMATOLOGY                         PT              13.3         

                12.0 - 14.7  

                          2019                                            

   

                                                    [facility]     

               

 

                HEMATOLOGY                         PTT             40.0         

                22.9 - 35.8 

                          2019                                            

  

                                                    [facility]     

               

 

                BLOOD BANK RESULTS                         ABO/Rh          O POS

                         

                          2019                                            

 

                                                    [facility]     

               

 

                HEMATOLOGY                         vWF Assay       32           

              45 - 140

                          2019                                            

 

                                                    [facility]     

               

 

                HEMATOLOGY                         Factor VIII     81           

              50 - 

242                         2019                                          

                                                    [facility]     

               

 

                HEMATOLOGY                         vWF Antigen     38           

              45 - 

165                         2019                                          

                                                    [facility]     

               

 

                    HEMATOLOGY                         vW Interp           von W

illebrand panel shows normal 

level of Factor VIII (81%), and decreased levels of vWF (antigen and 
functional). These results are suggestive of mild von Willebrand disease. 
Clinical correlation is suggested. CPT: 22510                                   

                    2019                                                  

     

                                        [facility]                 

   

 

                    ANEMIA STUDY                         Vitamin B12 Lvl     551

                        

                    254 - 1320                         2019               

                   

                                                    [facility]     

           

    

 

                ANEMIA STUDY                         Folate Lvl      19.7       

                  

>=3.0 ng/mL                         2019                                  

                                                    [facility]     

           

    

 

                HEMATOLOGY                         WBC             7.7          

               3.7 - 10.4   

                          2019                                            

    

                                                    [facility]     

               

 

                HEMATOLOGY                         RBC             4.53         

                4.20 - 5.40 

                          2019                                            

  

                                                    [facility]     

               

 

                HEMATOLOGY                         Hgb             12.0         

                12.0 - 16.0 

                          2019                                            

  

                                                    [facility]     

               

 

                HEMATOLOGY                         Hct             36.8         

                36.0 - 48.0 

                          2019                                            

  

                                                    [facility]     

               

 

                HEMATOLOGY                         MCV             81.3         

                80.0 - 98.0 

                          2019                                            

  

                                                    [facility]     

               

 

                HEMATOLOGY                         MCH             26.6         

                27.0 - 31.0 

                          2019                                            

  

                                                    [facility]     

               

 

                HEMATOLOGY                         MCHC            32.7         

                32.0 - 36.0

                          2019                                            

 

                                                    [facility]     

               

 

                HEMATOLOGY                         RDW             16.1         

                11.5 - 14.5 

                          2019                                            

  

                                                    [facility]     

               

 

                HEMATOLOGY                         Platelet        258          

               133 - 

450                         2019                                          

                                                    [facility]     

               

 

                HEMATOLOGY                         MPV             9.6          

               7.4 - 10.4   

                          2019                                            

    

                                                    [facility]     

               

 

                    HEMATOLOGY                         Mix Stdy Intrp      Basel

ine PTT is prolonged, 

corrected with mixing. Thrombin Time is normal at 16.6 sec. Impression: results 
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 
XI, or XII). CPT: 19497                                                   

2019                                                                      

 

                                        [facility]                 

   

 

                HEMATOLOGY                         PT Baseline     13.2         

                12.0

 - 14.7                         2019                                      

                                                    [facility]     

               

 

                HEMATOLOGY                         PT 1:1 Imm      12.8         

                     

                    2019                                                  

                                        [facility]                 

   

 

                HEMATOLOGY                         PT 1:1 1Hr      12.8         

                     

                    2019                                                  

                                        [facility]                 

   

 

                HEMATOLOGY                         PTT Baseline    38.8         

                

22.9 - 35.8                         2019                                  

                                                    [facility]     

           

    

 

                HEMATOLOGY                         TT Baseline     16.6         

                15.0

 - 21.1                         2019                                      

                                                    [facility]     

               

 

                HEMATOLOGY                         PTT 1:1 Imm     33.8         

                    

                          2019                                            

     

                                                    [facility]     

               

 

                HEMATOLOGY                         PTT 1:1 1Hr     34.8         

                    

                          2019                                            

     

                                                    [facility]     

               

 

                HEMATOLOGY                         Segs            59.1         

                45.0 - 75.0

                          2019                                            

 

                                                    [facility]     

               

 

                HEMATOLOGY                         Lymphocytes     28.3         

                20.0

 - 40.0                         2019                                      

                                                    [facility]     

               

 

                HEMATOLOGY                         Monocytes       6.1          

               2.0 - 

12.0                         2019                                         

                                                    [facility]     

               

 

                HEMATOLOGY                         Eosinophils     5.6          

               0.0 -

 4.0                         2019                                         

                                                    [facility]     

               

 

                HEMATOLOGY                         Basophils       0.9          

               0.0 - 

1.0                         2019                                          

                                                    [facility]     

               

 

                HEMATOLOGY                         Neutrophils #   4.5          

               1.5

 - 8.1                         2019                                       

                                                    [facility]     

               

 

                HEMATOLOGY                         Lymphocytes #   2.2          

               1.0

 - 5.5                         2019                                       

                                                    [facility]     

               

 

                HEMATOLOGY                         Monocytes #     0.5          

               0.0 -

 0.8                         2019                                         

                                                    [facility]     

               

 

                HEMATOLOGY                         Eosinophils #   0.4          

               0.0

 - 0.5                         2019                                       

                                                    [facility]     

               

 

                HEMATOLOGY                         Basophils #     0.1          

               0.0 -

 0.2                         2019                                         

                                                    [facility]     

               

 

                HEMATOLOGY                         Hgb             9.4          

               12.0 - 16.0  

                          2015                                            

   

                                                    [facility]     

               

 

                HEMATOLOGY                         Hct             28.1         

                36.0 - 48.0 

                          2015                                            

  

                                                    [facility]     

               

 

                    BLOOD BANK RESULTS                         FFP product      

   Product available 

                    (1/11/15 4:34 PM)                                           

       2015   

                                                                        [facility]                    

 

                    BLOOD BANK RESULTS                         RBC product      

   Product available 

                    (1/11/15 9:55 AM)                                           

       2015   

                                                                        [facility]                    

 

                    BLOOD BANK RESULTS                         RBC product      

   Product available 

                    (1/11/15 7:13 AM)                                           

       2015   

                                                                        [facility]                    

 

                BLOOD BANK RESULTS                         ABO/Rh          O POS

                         

                          2015                                            

 

                                                    [facility]     

               

 

                    BLOOD BANK RESULTS                         Antibody Scrn    

   Negative 

                    (1/10/15 10:25 PM)                                          

        2015  

                                                                         [facility]                    

 

                HEMATOLOGY                         WBC             9.3          

               3.7 - 10.4   

                          2015                                            

    

                                                    [facility]     

               

 

                HEMATOLOGY                         RBC             3.73         

                4.20 - 5.40 

                          2015                                            

  

                                                    [facility]     

               

 

                HEMATOLOGY                         Hgb             10.3         

                12.0 - 16.0 

                          2015                                            

  

                                                    [facility]     

               

 

                HEMATOLOGY                         MCH             27.6         

                27.0 - 31.0 

                          2015                                            

  

                                                    [facility]     

               

 

                HEMATOLOGY                         MCHC            32.1         

                32.0 - 36.0

                          2015                                            

 

                                                    [facility]     

               

 

                HEMATOLOGY                         Platelet        210          

               133 - 

450                         2015                                          

                                                    [facility]     

               

 

                HEMATOLOGY                         RDW             15.2         

                11.5 - 14.5 

                          2015                                            

  

                                                    [facility]     

               

 

                HEMATOLOGY                         MCV             85.9         

                80.0 - 98.0 

                          2015                                            

  

                                                    [facility]     

               

 

                HEMATOLOGY                         Hct             32.1         

                36.0 - 48.0 

                          2015                                            

  

                                                    [facility]     

               

 

                HEMATOLOGY                         MPV             10.3         

                7.4 - 10.4  

                          2015                                            

   

                                                    [facility]     

               

 

                HEMATOLOGY                         Lymphocytes     22.2         

                20.0

 - 40.0                         2015                                      

                                                    [facility]     

               

 

                HEMATOLOGY                         Monocytes       6.0          

               2.0 - 

12.0                         2015                                         

                                                    [facility]     

               

 

                HEMATOLOGY                         Eosinophils     2.4          

               0.0 -

 4.0                         2015                                         

                                                    [facility]     

               

 

                HEMATOLOGY                         Basophils       0.8          

               0.0 - 

1.0                         2015                                          

                                                    [facility]     

               

 

                HEMATOLOGY                         Segs-Bands #    6.4          

               1.5 

- 8.1                         2015                                        

                                                    [facility]     

               

 

                HEMATOLOGY                         Monocytes #     0.6          

               0.0 -

 0.8                         2015                                         

                                                    [facility]     

               

 

                HEMATOLOGY                         Eosinophils #   0.2          

               0.0

 - 0.5                         2015                                       

                                                    [facility]     

               

 

                HEMATOLOGY                         Basophils #     0.1          

               0.0 -

 0.2                         2015                                         

                                                    [facility]     

               

 

                HEMATOLOGY                         Lymphocytes #   2.1          

               1.0

 - 5.5                         2015                                       

                                                    [facility]     

               

 

                HEMATOLOGY                         Segs            68.6         

                45.0 - 75.0

                          2015                                            

 

                                                    [facility]     

               

 

                    IMMUNOLOGY                         Hep Bs Ag           Negat

kay 

*NA*

                    (1/10/15 10:25 PM)                         Negative         

                

2015                                                                      

 

                                        [facility]                 

   

 

                    IMMUNOLOGY                         Treponemal Scr      Non R

eactive 

*NA*

                    (1/10/15 10:25 PM)                         Non Reactive     

                    

2015                                                                      

 

                                        [facility]                 

   

 

                BLOOD BANK RESULTS                         ABO/Rh          O POS

                         

                          10/03/2014                                            

 

                                                    [facility]     

               

 

                    BLOOD BANK RESULTS                         Antibody Scrn    

   Negative 

                    (10/3/14 8:10 AM)                                           

       10/03/2014   

                                                                        [facility]                    

 

                HEMATOLOGY                         MPV             9.7          

               7.4 - 10.4   

                          10/03/2014                                            

    

                                                    [facility]     

               

 

                HEMATOLOGY                         Platelet        194          

               133 - 

450                         10/03/2014                                          

                                                    [facility]     

               

 

                HEMATOLOGY                         Hct             30.1         

                36.0 - 48.0 

                          10/03/2014                                            

  

                                                    [facility]     

               

 

                HEMATOLOGY                         MCH             28.6         

                27.0 - 31.0 

                          10/03/2014                                            

  

                                                    [facility]     

               

 

                HEMATOLOGY                         MCV             85.9         

                80.0 - 98.0 

                          10/03/2014                                            

  

                                                    [facility]     

               

 

                HEMATOLOGY                         MCHC            33.3         

                32.0 - 36.0

                          10/03/2014                                            

 

                                                    [facility]     

               

 

                HEMATOLOGY                         RDW             15.0         

                11.5 - 14.5 

                          10/03/2014                                            

  

                                                    [facility]     

               

 

                HEMATOLOGY                         Hgb             10.0         

                12.0 - 16.0 

                          10/03/2014                                            

  

                                                    [facility]     

               

 

                HEMATOLOGY                         RBC             3.50         

                4.20 - 5.40 

                          10/03/2014                                            

  

                                                    [facility]     

               

 

                HEMATOLOGY                         WBC             8.2          

               3.7 - 10.4   

                          10/03/2014                                            

    

                                                    [facility]     

               

 

                HEMATOLOGY                         Lymphocytes     20.2         

                20.0

 - 40.0                         10/03/2014                                      

                                                    [facility]     

               

 

                HEMATOLOGY                         Monocytes       5.6          

               2.0 - 

12.0                         10/03/2014                                         

                                                    [facility]     

               

 

                HEMATOLOGY                         Segs            70.2         

                45.0 - 75.0

                          10/03/2014                                            

 

                                                    [facility]     

               

 

                HEMATOLOGY                         Eosinophils     3.7          

               0.0 -

 4.0                         10/03/2014                                         

                                                    [facility]     

               

 

                HEMATOLOGY                         Basophils       0.3          

               0.0 - 

1.0                         10/03/2014                                          

                                                    [facility]     

               

 

                HEMATOLOGY                         Lymphocytes #   1.7          

               1.0

 - 5.5                         10/03/2014                                       

                                                    [facility]     

               

 

                HEMATOLOGY                         Monocytes #     0.5          

               0.0 -

 0.8                         10/03/2014                                         

                                                    [facility]     

               

 

                HEMATOLOGY                         Segs-Bands #    5.8          

               1.5 

- 8.1                         10/03/2014                                        

                                                    [facility]     

               

 

                HEMATOLOGY                         Eosinophils #   0.3          

               0.0

 - 0.5                         10/03/2014                                       

                                                    [facility]     

               

 

                CHEM PANEL                         Lipase Lvl      89           

              73 - 

393                         2014                                          

                                                    [facility]     

               

 

                CHEM PANEL                         Amylase Lvl     41           

              25 - 

115                         2014                                          

                                                    [facility]     

               

 

                ELECTROLYTES                         AGAP            15.9       

                  10.0 - 

20.0                         2014                                         

                                                    [facility]     

               

 

                ELECTROLYTES                         B/C Ratio       10         

                6 - 25

                          2014                                            

 

                                                    [facility]     

               

 

                ELECTROLYTES                         Globulin        3.2        

                 2.0 - 

4.0                         2014                                          

                                                    [facility]     

               

 

                ELECTROLYTES                         A/G Ratio       0.8        

                 0.7 -

 1.6                         2014                                         

                                                    [facility]     

               

 

                ELECTROLYTES                         eGFR            126        

                           

                    2014                                                  

<sup>1</sup>Result Comment: The eGFR is calculated using the CKD-EPI formula. In
 most young, healthy individuals the eGFR will be >90 mL/min/1.73m2. The eGFR 
declines with age. An eGFR of 60-89 may be normal in some populations, 
particularly the elderly, for whom the CKD-EPI formula has not been extensively 
validated. Use of the eGFR is not recommended in the following populations:&
lt;br/><br/>Individuals with unstable creatinine concentrations, including 
pregnant patients and those with serious co-morbid conditions.<br/><br/>Patients
 with extremes in muscle mass or diet. <br/><br/>The data above are obtained 
from the National Kid[employer] Education Program (NKDEP) which additionally 
recommends that when the eGFR is used in patients with extremes of body mass 
index for purposes of drug dosing, the eGFR should be multiplied by the 
estimated BMI.                          [facility]                 

 

  

 

                ELECTROLYTES                         Albumin Lvl     2.6        

                 3.5

 - 5.0                         2014                                       

                                                    [facility]     

               

 

                ELECTROLYTES                         Bili Total      0.4        

                 0.2 

- 1.3                         2014                                        

                                                    [facility]     

               

 

                ELECTROLYTES                         Alk Phos        50         

                39 - 

136                         2014                                          

                                                    [facility]     

               

 

                ELECTROLYTES                         AST             14         

                0 - 37      

                    2014                                                  

 

                                        [facility]                 

   

 

                ELECTROLYTES                         ALT             23         

                0 - 65      

                    2014                                                  

 

                                        [facility]                 

   

 

                ELECTROLYTES                         Total Protein   5.8        

                 

6.4 - 8.4                         2014                                    

                                                    [facility]     

             

  

 

                ELECTROLYTES                         Calcium Lvl     8.8        

                 8.5

 - 10.5                         2014                                      

                                                    [facility]     

               

 

                ELECTROLYTES                         CO2             22         

                24 - 32     

                    2014                                                  

                                        [facility]                 

   

 

                ELECTROLYTES                         Chloride Lvl    108        

                 95

 - 109                         2014                                       

                                                    [facility]     

               

 

                ELECTROLYTES                         Potassium Lvl   3.9        

                 

3.5 - 5.1                         2014                                    

                                                    [facility]     

             

  

 

                ELECTROLYTES                         Sodium Lvl      142        

                 135 

- 145                         2014                                        

                                                    [facility]     

               

 

                ELECTROLYTES                         Creatinine Lvl  0.6        

                 

0.5 - 1.4                         2014                                    

                                                    [facility]     

             

  

 

                ELECTROLYTES                         Glucose Lvl     83         

                70 -

 99                         2014                                          

                                        <sup>3</sup>Interpretive Data: Adult ref

erence range values reflect the 

clinical guidelines<br/>of the American Diabetes [employer].                   
                                        [facility]                 

   

 

                ELECTROLYTES                         BUN             6          

               7 - 22       

                    2014                                                  

  

                                        [facility]                 

   

 

                HEMATOLOGY                         Monocytes #     0.5          

               0.0 -

 0.8                         2014                                         

                                                    [facility]     

               

 

                HEMATOLOGY                         Lymphocytes #   2.2          

               1.0

 - 5.5                         2014                                       

                                                    [facility]     

               

 

                HEMATOLOGY                         Monocytes       5.9          

               2.0 - 

12.0                         2014                                         

                                                    [facility]     

               

 

                HEMATOLOGY                         Eosinophils #   0.4          

               0.0

 - 0.5                         2014                                       

                                                    [facility]     

               

 

                HEMATOLOGY                         Segs-Bands #    4.9          

               1.5 

- 8.1                         2014                                        

                                                    [facility]     

               

 

                HEMATOLOGY                         Segs            61.4         

                45.0 - 75.0

                          2014                                            

 

                                                    [facility]     

               

 

                HEMATOLOGY                         Basophils       0.5          

               0.0 - 

1.0                         2014                                          

                                                    [facility]     

               

 

                HEMATOLOGY                         Lymphocytes     27.2         

                20.0

 - 40.0                         2014                                      

                                                    [facility]     

               

 

                HEMATOLOGY                         Eosinophils     5.0          

               0.0 -

 4.0                         2014                                         

                                                    [facility]     

               

 

                HEMATOLOGY                         WBC             8.0          

               3.7 - 10.4   

                          2014                                            

    

                                                    [facility]     

               

 

                HEMATOLOGY                         Hgb             10.2         

                12.0 - 16.0 

                          2014                                            

  

                                                    [facility]     

               

 

                HEMATOLOGY                         RBC             3.61         

                4.20 - 5.40 

                          2014                                            

  

                                                    [facility]     

               

 

                HEMATOLOGY                         Hct             30.6         

                36.0 - 48.0 

                          2014                                            

  

                                                    [facility]     

               

 

                HEMATOLOGY                         MCH             28.3         

                27.0 - 31.0 

                          2014                                            

  

                                                    [facility]     

               

 

                HEMATOLOGY                         MCHC            33.5         

                32.0 - 36.0

                          2014                                            

 

                                                    [facility]     

               

 

                HEMATOLOGY                         MCV             84.6         

                81.0 - 99.0 

                          2014                                            

  

                                                    [facility]     

               

 

                HEMATOLOGY                         Platelet        200          

               133 - 

450                         2014                                          

                                                    [facility]     

               

 

                HEMATOLOGY                         RDW             14.1         

                11.5 - 14.5 

                          2014                                            

  

                                                    [facility]     

               

 

                HEMATOLOGY                         MPV             9.3          

               7.4 - 10.4   

                          2014                                            

    

                                                    [facility]     

               

 

                    URINE AND STOOL                         UA Urobilinogen     

<=1.0 mg/dL             

                          0.1 - 1.0                         2014          

              

                                                                      [facility]      

              

 

                    URINE AND STOOL                         UA Turbidity        

Clear 

                    (14 9:03 AM)                         Clear             

            

2014                                                                      

 

                                        [facility]                 

   

 

                    URINE AND STOOL                         UA Spec Grav        

1.006                      

                    <=1.030                         2014                  

                 

                                                    [facility]     

            

   

 

                    URINE AND STOOL                         UA Color            

Light Yellow 

*NA*

                    (14 9:03 AM)                         Yellow            

             

2014                                                                      

 

                                        [facility]                 

   

 

                URINE AND STOOL                         UA WBC          1       

                  0 - 5  

                          2014                                            

   

                                                    [facility]     

               

 

                    URINE AND STOOL                         UA Blood            

Negative 

                    (14 9:03 AM)                         Negative          

               

2014                                                                      

 

                                        [facility]                 

   

 

                    URINE AND STOOL                         UA Nitrite          

Negative 

                    (14 9:03 AM)                         Negative          

               

2014                                                                      

 

                                        [facility]                 

   

 

                    URINE AND STOOL                         UA Leuk Est         

Small 

*ABN*

                    (14 9:03 AM)                         Negative          

               

2014                                                                      

 

                                        [facility]                 

   

 

                    URINE AND STOOL                         UA Sq Epi           

Many /LPF                     

                    Few /LPF                         2014                 

                

                                                    [facility]     

          

     

 

                    URINE AND STOOL                         UA Protein          

Negative mg/dL               

                          Negative mg/dL                         2014     

                

                                                                      [facility]   

                 

 

                URINE AND STOOL                         UA pH           7.0     

                    5.0 - 

8.0                         2014                                          

                                                    [facility]     

               

 

                    URINE AND STOOL                         UA Glucose          

Negative mg/dL               

                          Negative mg/dL                         2014     

                

                                                                      [facility]   

                 

 

                    URINE AND STOOL                         UA Ketones          

Negative mg/dL               

                          Negative mg/dL                         2014     

                

                                                                      [facility]   

                 

 

                    URINE AND STOOL                         UA Bili             

Negative 

*NA*

                    (14 9:03 AM)                         Negative          

               

2014                                                                      

 

                                        [facility]                 

   

 

                    URINE AND STOOL                         UA Amorph Chana      

Occasional /HPF          

                          None Seen /HPF                         2014     

           

                                                                      [facility]                    

 

                    URINE AND STOOL                         UA Mucus            

Few /LPF                       

                    None Seen /LPF                         2014           

                  

                                                    [facility]     

      

         

 

                    URINE AND STOOL                         UA Bacteria         

Occasional /HPF             

                          None Seen /HPF                         2014     

              

                                                                      [facility] 

                   

 

                THYROID PANEL                         TSH             1.040     

                    0.360 - 

3.740                         2014                                        

                                                    [facility]     

               

 

                CHEM PANEL                         Globulin        3.8          

               2.0 - 

4.0                         2014                                          

                                                    [facility]     

               

 

                CHEM PANEL                         A/G Ratio       0.9          

               0.7 - 

1.6                         2014                                          

                                                    [facility]     

               

 

                CHEM PANEL                         AGAP            16.2         

                10.0 - 20.0

                          2014                                            

 

                                                    [facility]     

               

 

                CHEM PANEL                         B/C Ratio       15           

              6 - 25  

                          2014                                            

   

                                                    [facility]     

               

 

                CHEM PANEL                         eGFR            127          

                           

                    2014                                                  

<sup>2</sup>Result Comment: The eGFR is calculated using the CKD-EPI formula. In
 most young, healthy individuals the eGFR will be >90 mL/min/1.73m2. The eGFR 
declines with age. An eGFR of 60-89 may be normal in some populations, 
particularly the elderly, for whom the CKD-EPI formula has not been extensively 
validated. Use of the eGFR is not recommended in the following populations:&
lt;br/><br/>Individuals with unstable creatinine concentrations, including 
pregnant patients and those with serious co-morbid conditions.<br/><br/>Patients
 with extremes in muscle mass or diet. <br/><br/>The data above are obtained 
from the National Kidney Disease Education Program (NKDEP) which additionally 
recommends that when the eGFR is used in patients with extremes of body mass 
index for purposes of drug dosing, the eGFR should be multiplied by the 
estimated BMI.                          [facility]                 

 

  

 

                CHEM PANEL                         AST             27           

              0 - 37        

                    2014                                                  

   

                                        [facility]                 

   

 

                CHEM PANEL                         Alk Phos        70           

              39 - 136 

                          2014                                            

  

                                                    [facility]     

               

 

                CHEM PANEL                         Bili Total      0.3          

               0.2 - 

1.3                         2014                                          

                                                    [facility]     

               

 

                CHEM PANEL                         Total Protein   7.1          

               6.4

 - 8.4                         2014                                       

                                                    [facility]     

               

 

                CHEM PANEL                         Albumin Lvl     3.3          

               3.5 -

 5.0                         2014                                         

                                                    [facility]     

               

 

                CHEM PANEL                         ALT             25           

              0 - 65        

                    2014                                                  

   

                                        [facility]                 

   

 

                CHEM PANEL                         Glucose Lvl     85           

              70 - 

99                         2014                                           

                                        <sup>4</sup>Interpretive Data: Adult ref

erence range values reflect the 

clinical guidelines<br/>of the American Diabetes Association.                   
                                        [facility]                 

   

 

                CHEM PANEL                         CO2             23           

              24 - 32       

                    2014                                                  

  

                                        [facility]                 

   

 

                CHEM PANEL                         Calcium Lvl     9.7          

               8.5 -

 10.5                         2014                                        

                                                    [facility]     

               

 

                CHEM PANEL                         Sodium Lvl      142          

               135 - 

145                         2014                                          

                                                    [facility]     

               

 

                CHEM PANEL                         Potassium Lvl   4.2          

               3.5

 - 5.1                         2014                                       

                                                    [facility]     

               

 

                CHEM PANEL                         Chloride Lvl    107          

               95 -

 109                         2014                                         

                                                    [facility]     

               

 

                CHEM PANEL                         BUN             9            

             7 - 22         

                    2014                                                  

    

                                        [facility]                 

   

 

                CHEM PANEL                         Creatinine Lvl  0.6          

               

0.5 - 1.4                         2014                                    

                                                    [facility]     

             

  

 

                CHEM PANEL                         Lipase Lvl      115          

               73 - 

393                         2014                                          

                                                    [facility]     

               

 

                CHEM PANEL                         Amylase Lvl     51           

              25 - 

115                         2014                                          

                                                    [facility]     

               

 

                HEMATOLOGY                         Monocytes       5.0          

               2.0 - 

12.0                         2014                                         

                                                    [facility]     

               

 

                HEMATOLOGY                         Lymphocytes     26.2         

                20.0

 - 40.0                         2014                                      

                                                    [facility]     

               

 

                HEMATOLOGY                         Segs            64.6         

                45.0 - 75.0

                          2014                                            

 

                                                    [facility]     

               

 

                HEMATOLOGY                         Lymphocytes #   2.6          

               1.0

 - 5.5                         2014                                       

                                                    [facility]     

               

 

                HEMATOLOGY                         Segs-Bands #    6.4          

               1.5 

- 8.1                         2014                                        

                                                    [facility]     

               

 

                HEMATOLOGY                         Basophils       0.6          

               0.0 - 

1.0                         2014                                          

                                                    [facility]     

               

 

                HEMATOLOGY                         Monocytes #     0.5          

               0.0 -

 0.8                         2014                                         

                                                    [facility]     

               

 

                HEMATOLOGY                         Eosinophils     3.6          

               0.0 -

 4.0                         2014                                         

                                                    [facility]     

               

 

                HEMATOLOGY                         Basophils #     0.1          

               0.0 -

 0.2                         2014                                         

                                                    [facility]     

               

 

                HEMATOLOGY                         Eosinophils #   0.4          

               0.0

 - 0.5                         2014                                       

                                                    [facility]     

               

 

                HEMATOLOGY                         WBC             9.9          

               3.7 - 10.4   

                          2014                                            

    

                                                    [facility]     

               

 

                HEMATOLOGY                         Hgb             11.8         

                12.0 - 16.0 

                          2014                                            

  

                                                    [facility]     

               

 

                HEMATOLOGY                         RBC             4.18         

                4.20 - 5.40 

                          2014                                            

  

                                                    [facility]     

               

 

                HEMATOLOGY                         MCV             84.5         

                81.0 - 99.0 

                          2014                                            

  

                                                    [facility]     

               

 

                HEMATOLOGY                         Hct             35.4         

                36.0 - 48.0 

                          2014                                            

  

                                                    [facility]     

               

 

                HEMATOLOGY                         MCHC            33.5         

                32.0 - 36.0

                          2014                                            

 

                                                    [facility]     

               

 

                HEMATOLOGY                         MCH             28.3         

                27.0 - 31.0 

                          2014                                            

  

                                                    [facility]     

               

 

                HEMATOLOGY                         Platelet        239          

               133 - 

450                         2014                                          

                                                    [facility]     

               

 

                HEMATOLOGY                         RDW             14.2         

                11.5 - 14.5 

                          2014                                            

  

                                                    [facility]     

               

 

                HEMATOLOGY                         MPV             9.5          

               7.4 - 10.4   

                          2014                                            

    

                                                    [facility]     

               

 

                ENDOCRINOLOGY                         hCG Tot         [id number]    

                        

                          2014                                            

    

                                        <sup>3</sup>Interpretive Data: Reference

 Range:<br/>     Male                 

0 - 5 mIU/mL<br/>     Non-Pregnant Female  0 - 5 mIU/mL<br/><br/>Note: hCG 
result should be used in conjunction with symptoms, results<br/>of other tests, 
and clinical impressions.<br/><br/>Weeks of Gestation        hCG 
(mIU/mL)<br/>------------------        ----------------<br/>     3              
      6 - 71<br/>     4                    <br/>     5                    
217 - 7,138<br/>     6                    158 -31,795<br/>     7                
    3,697 - 163,563<br/>     8                    32,065 - 149,571<br/>     9   
                 63,803 - 151,410<br/>     10                   46,506 - 
186,977&lt;br/>     11                   27,832 - 210,612<br/>     14           
        13,950 - 62,530<br/>     15                   12,039 - 70,971<br/>     
16                   9,040 - 56,451<br/>     17                   8,175 - 
55,868<br/>     18                   8,099 - 58,176                         [facility]                    

 

                CHEM PANEL                         eGFR            121          

                           

                    2014                                                  

<sup>1</sup>Result Comment: The eGFR is calculated using the CKD-EPI formula. In
 most young, healthy individuals the eGFR will be >90 mL/min/1.73m2. The eGFR 
declines with age. An eGFR of 60-89 may be normal in some populations, 
particularly the elderly, for whom the CKD-EPI formula has not been extensively 
validated. Use of the eGFR is not recommended in the following populations:&
lt;br/><br/>Individuals with unstable creatinine concentrations, including 
pregnant patients and those with serious co-morbid conditions.<br/><br/>Patients
 with extremes in muscle mass or diet. <br/><br/>The data above are obtained 
from the National Kidney Disease Education Program (NKDEP) which additionally 
recommends that when the eGFR is used in patients with extremes of body mass 
index for purposes of drug dosing, the eGFR should be multiplied by the 
estimated BMI.                          [facility]                 

 

  

 

                CHEM PANEL                         Calcium Lvl     9.7          

               8.5 -

 10.5                         2014                                        

                                                    [facility]     

               

 

                CHEM PANEL                         CO2             26           

              24 - 32       

                    2014                                                  

  

                                        [facility]                 

   

 

                CHEM PANEL                         Chloride Lvl    101          

               95 -

 109                         2014                                         

                                                    [facility]     

               

 

                CHEM PANEL                         BUN             13           

              7 - 22        

                    2014                                                  

   

                                        [facility]                 

   

 

                CHEM PANEL                         Creatinine Lvl  0.7          

               

0.5 - 1.4                         2014                                    

                                                    [facility]     

             

  

 

                CHEM PANEL                         Sodium Lvl      136          

               135 - 

145                         2014                                          

                                                    [facility]     

               

 

                CHEM PANEL                         Glucose Lvl     91           

              70 - 

99                         2014                                           

                                        <sup>2</sup>Interpretive Data: Adult ref

erence range values reflect the 

clinical guidelines<br/>of the American Diabetes Association.                   
                                        [facility]                 

   

 

                CHEM PANEL                         Potassium Lvl   3.7          

               3.5

 - 5.1                         2014                                       

                                                    [facility]     

               

 

                CHEM PANEL                         AGAP            12.7         

                10.0 - 20.0

                          2014                                            

 

                                                    [facility]     

               

 

                    HEMATOLOGY                         Plt Morph           Justyna

l 

                    (14 6:55 PM)                                            

      2014    

                                                                       [facility]                    

 

                    HEMATOLOGY                         RBC Morph           Justyna

l 

                    (14 6:55 PM)                                            

      2014    

                                                                       [facility]                    

 

                HEMATOLOGY                         Eosinophils #   0.4          

               0.0

 - 0.5                         2014                                       

                                                    [facility]     

               

 

                HEMATOLOGY                         Monocytes #     0.7          

               0.0 -

 0.8                         2014                                         

                                                    [facility]     

               

 

                HEMATOLOGY                         Lymphocytes #   2.5          

               1.0

 - 5.5                         2014                                       

                                                    [facility]     

               

 

                HEMATOLOGY                         Segs-Bands #    10.3         

                1.5

 - 8.1                         2014                                       

                                                    [facility]     

               

 

                HEMATOLOGY                         Basophils       0.0          

               0.0 - 

1.0                         2014                                          

                                                    [facility]     

               

 

                HEMATOLOGY                         Eosinophils     2.8          

               0.0 -

 4.0                         2014                                         

                                                    [facility]     

               

 

                HEMATOLOGY                         Monocytes       4.9          

               2.0 - 

12.0                         2014                                         

                                                    [facility]     

               

 

                HEMATOLOGY                         Lymphocytes     18.2         

                20.0

 - 40.0                         2014                                      

                                                    [facility]     

               

 

                HEMATOLOGY                         Segs            74.1         

                45.0 - 75.0

                          2014                                            

 

                                                    [facility]     

               

 

                HEMATOLOGY                         Basophils #     0.0          

               0.0 -

 0.2                         2014                                         

                                                    [facility]     

               

 

                HEMATOLOGY                         WBC             13.9         

                3.7 - 10.4  

                          2014                                            

   

                                                    [facility]     

               

 

                HEMATOLOGY                         Hct             33.8         

                36.0 - 48.0 

                          2014                                            

  

                                                    [facility]     

               

 

                HEMATOLOGY                         RBC             4.04         

                4.20 - 5.40 

                          2014                                            

  

                                                    [facility]     

               

 

                HEMATOLOGY                         Hgb             12.1         

                12.0 - 16.0 

                          2014                                            

  

                                                    [facility]     

               

 

                HEMATOLOGY                         MPV             9.2          

               7.4 - 10.4   

                          2014                                            

    

                                                    [facility]     

               

 

                HEMATOLOGY                         MCH             29.9         

                27.0 - 31.0 

                          2014                                            

  

                                                    [facility]     

               

 

                HEMATOLOGY                         MCHC            35.7         

                32.0 - 36.0

                          2014                                            

 

                                                    [facility]     

               

 

                HEMATOLOGY                         MCV             83.8         

                81.0 - 99.0 

                          2014                                            

  

                                                    [facility]     

               

 

                HEMATOLOGY                         RDW             13.3         

                11.5 - 14.5 

                          2014                                            

  

                                                    [facility]     

               

 

                HEMATOLOGY                         Platelet        275          

               133 - 

450                         2014                                          

                                                    [facility]     

               

 

                    URINE AND STOOL                         Micro?              

Performed 

                    (14 4:50 PM)                                            

      2014    

                                                                       [facility]                    

 

                URINE AND STOOL                         UA RBC          0-2 /HPF

                         

0 - 2                         2014                                        

                                                    [facility]     

               

 

                    URINE AND STOOL                         UA Sq Epi           

Occasional /LPF               

                    Few /LPF                         2014                 

          

                                                    [facility]     

    

           

 

                    URINE AND STOOL                         UA WBC              

None Seen 

                    (14 4:50 PM)                         None Seen          

               

2014                                                                      

 

                                        [facility]                 

   

 

                    URINE AND STOOL                         UA Bacteria         

Few /HPF                    

                    None Seen /HPF                         2014           

               

                                                    [facility]     

   

            

 

                    URINE AND STOOL                         UA Bili             

Negative 

*NA*

                    (14 4:50 PM)                         Negative           

              

2014                                                                      

 

                                        [facility]                 

   

 

                    URINE AND STOOL                         UA Leuk Est         

Negative 

                    (14 4:50 PM)                         Negative           

              

2014                                                                      

 

                                        [facility]                 

   

 

                    URINE AND STOOL                         UA Urobilinogen     

0.2                     

                    0.1 - 1.0                         2014                

                

                                                    [facility]     

         

      

 

                    URINE AND STOOL                         UA Blood            

Negative 

                    (14 4:50 PM)                         Negative           

              

2014                                                                      

 

                                        [facility]                 

   

 

                    URINE AND STOOL                         UA Turbidity        

Clear 

                    (14 4:50 PM)                         Clear              

           

2014                                                                      

 

                                        [facility]                 

   

 

                    URINE AND STOOL                         UA Ketones          

Trace mg/dL                  

                          Negative mg/dL                         2014     

                   

                                                                      [facility]      

              

 

                    URINE AND STOOL                         UA Glucose          

Negative mg/dL               

                          Negative mg/dL                         2014     

                

                                                                      [facility]   

                 

 

                    URINE AND STOOL                         UA Nitrite          

Negative 

                    (14 4:50 PM)                         Negative           

              

2014                                                                      

 

                                        [facility]                 

   

 

                URINE AND STOOL                         UA pH           6.0     

                    5.0 - 

8.0                         2014                                          

                                                    [facility]     

               

 

                    URINE AND STOOL                         UA Spec Grav        

1.025                      

                    <=1.030                         2014                  

                 

                                                    [facility]     

            

   

 

                    URINE AND STOOL                         UA Color            

Yellow 

*NA*

                    (14 4:50 PM)                         Yellow             

            

2014                                                                      

 

                                        [facility]                 

   

 

                    URINE AND STOOL                         UA Protein          

Negative mg/dL               

                          Negative mg/dL                         2014     

                

                                                                      [facility]   

                 

 

                    URINE CHEM                         U Preg              Posit

kay 

*ABN*

                    (14 4:50 PM)                         Negative           

              

2014                                                                      

 

                                        [facility]                 

   



                                                                                
                                                                                
                                                                                
                                                                                
                                                                                
                                                                                
                                                                                
                                                                                
                                                                                
                                                                                
                                                                                
                                                                                
                                                                                
                                                                                
                                                                                
                                                                                
                                                                                
                                                                                
                                                                                
                                                                                
                                                                                
                                                                                
                                                                                
                                                                                
                                                                                
                                                                                
                                                                                
                                                



Pathology Reports





                                        No Data Provided for This Section       

             



                                                



Diagnostic Reports

                    



                    Report                         Value                        

 Date               

                                        Source                    

 

                          Pelvis w Pelvis Transvaginal US                       

  EXAM: US PELVIS 

TRANSABDOMINAL

EXAM: US PELVIS TRANSVAGINAL

DATE: 2020 10:54 CST

 

INDICATION: pelvic pain - pelvic pain

 

ADDITIONAL INFORMATION: None.

COMPARISON: CT 2020

 

TECHNIQUE:  Multiplanar grayscale and color Doppler ultrasound of the pelvis 
were obtained transabdominally through a distended urinary bladder and 
transvaginally postvoid.

FINDINGS: 

Uterus/Myometrium:

Size: 10.5 x 4.7 x 5.1 cm

Orientation: Anteverted.

Echogenicity: Normal. A  scar is identified.

Masses: None.

Cervix: Normal.

Endometrium:

Thickness: 1.9 cm

Cysts/Masses: 0.3 cm cyst in the posterior endometrial cavity.

Right ovary:

Size: 2.8 x 2.3 x 2.2 cm

Cysts/Masses: None.

Left ovary: 

Size: 5.7 x 4.0 x 4.1 cm

Cysts/Masses: Complex septated and reticular left ovarian cyst measures 4.2 x 
3.4 x 3.5 centimeters. No internal vascularity demonstrated.

Adnexa: Normal. 

Free fluid: None.

Other: None.

IMPRESSION:

                                        1.  4.2 cm complex left ovarian cyst is 

likely a hemorrhagic cyst. Recommend 

follow-up ultrasound in 8-12 weeks to document resolution.

                                        2.   scar.

                                        3.  Diffusely thickened endometrial stri

pe with small area of cystic change may 

be due to secretory phase of menstrual cycle. This can also be reassessed on 
follow-up ultrasound.

                          2020                          [facility]    

 

               

 

                          Abdomen/Pelvis w/wo IV contrast CT                    

     In retrospect, there 

is a soft tissue bulge along the lateral aspect of the left rectus abdominis 
muscle centered around image 79 of series 3 where it is 2.4 x 3.2 cm. Coronal 
image 50 of series 501 and image 109 of series 500 show the same holds. This 
represents a subtle mass for which the differential considerations include an 
endometrioma.

 EXAM:  CT ABDOMEN AND PELVIS WITHOUT AND WITH CONTRAST

DATE: 2020 12:13 CST

 

INDICATION:  - R10.2   Pelvic and perineal pain

COMPARISON: None.

TECHNIQUE: Volumetric CT acquisition of the abdomen and pelvis before and after 
the intravenous administration contrast. Axial, coronal and sagittal 
reconstructions.

IV contrast: 100 cc Omnipaque 300

Oral contrast: 450 cc of Volumen

DLP: 2382.19 mGy-cm

FINDINGS: 

 

Lines and tubes: None.

Liver: There is hepatomegaly with span of 22.2 cm in the midclavicular line.

The liver is diffusely low in density.

The portal vein enhances normally but is slightly prominent measuring 12.7 mm in
 the malvin hepatis.

No focal liver lesion is present.

Biliary tree: No intra- or extrahepatic biliary ductal dilation.

Gallbladder: A single 3 mm gallstone layers dependently in the gallbladder 
lumen. The gallbladder wall is normal.

Pancreas: Normal.

Spleen: Mild splenomegaly with span of 12.9 cm.

Adrenals: Normal.

Kidneys and ureters: Normal.

Bladder: Normal.

Reproductive organs: The uterus is sharply anteflexed but otherwise is 
unremarkable.

Gastrointestinal tract: Normal caliber.

Appendix: Normal

Soft tissues are otherwise unremarkable.

Peritoneum and retroperitoneum: No ascites or free air. 

Lymph nodes: Normal.

Vasculature: Normal.

Bones: Normal.

Lower thorax: Clear.

Soft tissues: A fat-containing umbilical hernia is present.

IMPRESSION:

                                        1. Hepatomegaly with low-density liver. 

This possibly represents hepatic 

steatosis. Correlate clinically and with laboratory values.

                                        2. The spleen is at the upper limits of 

normal in size if not slightly 

increased, and the portal vein is also at the upper limits in size if not 
slightly increased. These are nonspecific but could be early signs of portal 
venous hypertension.

                          2020                          [facility] Hawley    

 

               

 

                                        Breast Mammo Diag YELENA w garrett incl CAD MA

                         



BILATERAL FIRST EVER DIGITAL DIAGNOSTIC MAMMOGRAM 3D/2D WITH CAD: 2020



CLINICAL: N63.0/Lump.  



Current study was evaluated with a Computer Aided Detection (CAD) system.  

COMPARISON:Comparison is made to exam dated:  2020 ultrasound - [facility].   

TECHNIQUE: Digital Breast Tomosynthesis was performed and utilized for 
Interpretation. Current study was also evaluated with a Computer Aided Detection
 (CAD) system. 

FINDINGS: 

The tissue of both breasts is extremely dense. This may lower the sensitivity of
 mammography.  

Patient complains of right nipple discharge for several months which was not 
elicited on today's examination. There is no mammographic correlate identified. 
Sonographic evaluation today did not reveal any abnormality either.

The patient complains of palpable lumps in the right lateral breast.  No 
mammographic abnormality appreciated. Sonographic evaluation today did not 
reveal any abnormality either.  

No significant masses, calcifications, or other findings are seen in either 
breast.  



IMPRESSION: BENIGN

RECOMMENDATION:Clinical follow up of the patient's right nipple discharge and 
palpable areas of concern in the right breast is recommended. If nipple 
discharge recurs, breast MRI may be considered for further evaluation.  

There is no mammographic evidence of malignancy. Follow-up with ACR/ACS 
guidelines.   This exam was interpreted at [id number] for [facility].  

Artur Moralez M.D.          

/penrad:2020 11:54:04  

Imaging Technologist(s): RT Santa(R)(M), [facility] 
Outpatient Imaging Department

letter sent: BI-RADS 1/2  

Mammogram BI-RADS: 2 Benign

                          2020                         [facility]    

 

               

 

                                        Breast Complete Uni US                  

       



ULTRASOUND OF RIGHT BREAST AND AXILLA: 2020

CLINICAL: N63.0/Mass.  



COMPARISON:No prior exams were available for comparison.  



TECHNIQUE: Color flow and real-time ultrasound of the right breast and axilla 
regions were performed.  



FINDINGS: 

No abnormalities were seen sonographically in the right breast or the right 
axilla.  There are no discrete cystic or solid masses present to correspond to 
the palpable lumpiness in the right lateral breast. Only normal breast tissue is
 seen in this area. 

There is no sonographic correlate for the patient's right nipple discharge; No 
discharge can be elicited at the time of the exam.  



IMPRESSION: INCOMPLETE: NEEDS ADDITIONAL IMAGING EVALUATION 

RECOMMENDATION:The area of palpable concern should be managed clinically.

Diagnostic mammogram will be performed for further evaluation.  

 This exam was interpreted at [id number] for [facility].  

Artur fernandes/penrad:2020 11:27:18  

Imaging Technologist(s): Arpita Gr [facility] 
Department

Ultrasound BI-RADS: 0 Indeterminate

                          2020                          OPIMARK [facility]    

 

               

 

                          Preg < 14wks sing gest w transvag/Dop US              

           EXAM: PELVIC 

AND TRANSVAGINAL ULTRASOUND 

 

DATE: 2014 10:15:00 PM

 

CLINICAL HISTORY: Abdominal pain, acute

 

PATIENT HISTORY: 23-year-old G3, P2 female with beta-hCG of 135,328. LMP is May 
3, 2014. Patient complains of cramping

 

COMPARISON: None.

 

TECHNIQUE:  Grayscale and color Doppler ultrasound of the pelvis was performed 
using transabdominal and endovaginal technique.

 

DISCUSSION:

A single viable intrauterine pregnancy with the fetus in the vertex position is 
identified. The fetal heart rate is regular at 170 bpm. There is a tiny 
subchorionic hemorrhage.

 

The average sonographic age is 9 weeks 2 days with a menstrual age of 8 weeks 3 
days by a last menstrual period of May 3, 2014. The mean sac diameter is 3.8 cm 
compatible with 9 weeks, 3 days. The crown-rump length is 2.4 cm, compatible 
with 9 weeks, one day. A yolk sac is identified and measures 0.35 cm. The 
estimated delivery date is 2015.

 

The anteverted uterus measures 10.6 x 7.2 x 8.2 cm. The right ovary is 3.0 x 2.0
 x 1.4 cm. The left ovary is 3.5 x 2.0 x 2.5 cm. A simple appearing left 
paraovarian cyst measures 1.5 x 1.8 x 2.3 cm.   A corpus luteum is present on 
the left ovary as well. The ovaries are normal and demonstrate flow.

 

The cervix measures 4.5 cm is length and is not effaced. Small amount of free 
fluid is seen at the cervical loss. No free intraperitoneal fluid.

 

IMPRESSION:

                                        1. Viable intrauterine pregnancy dated 9

 weeks 2 days by ultrasound criteria. 

The estimated delivery date is 2015.

                                        2. Small subchorionic hemorrhage.

                                        3. Simple 1.5 x 1.8 x 2.3 cm left paraov

avelina cyst.

                          2014                         [facility]                    



                                                                                
                                                    



Consultation Notes

                    



                                        No Data Provided for This Section       

             



                                                            



Discharge Summaries

                    



                                        No Data Provided for This Section       

             



                                                            



History and Physicals

                    



                                        No Data Provided for This Section       

             



                                                                



Vital Signs

                     



                    Vital Sign                         Value                    

     Date           

                          Comments                         Source               

     

 

                    Systolic (mm Hg)                         124                

          2019

                                                    [facility]     

  

             

 

                    Diastolic (mm Hg)                         82                

          2019

                                                    [facility]     

  

             

 

                    Heart Rate                         71                       

   2019       

                                                    [facility]     

         

      

 

                    Respitory Rate                         18                   

       2019   

                                                    [facility]     

     

          

 

                    Temperature Oral (F)                         98.1 F         

                

2019                                                   [facility]                    

 

                    Height                         175.3 cm                     

    2019      

                                                    [facility]     

        

       

 

                    Weight                         150.8                        

  2019        

                                                    [facility]     

          

     

 

                    BMI Calculated                         49.07                

          2019

                                                    [facility]     

  

             

 

                    Weight                         150                          

2019          

                                                    [facility]            

        

 

                    BMI Calculated                         47.45                

          2019

                                                    [facility]            

  

      

 

                    Height                         177.8 cm                     

    2019      

                                                    [facility]            

        

 

                    Heart Rate                         78                       

   2015       

                                                    [facility]     

         

      

 

                    Respitory Rate                         18                   

       2015   

                                                    [facility]     

     

          

 

                    Systolic (mm Hg)                         143                

          2015

                                                    [facility]     

  

             

 

                    Diastolic (mm Hg)                         83                

          2015

                                                    [facility]     

  

             

 

                    Temperature Oral (F)                         98 F           

              

2015                                                   [facility]                    

 

                    Heart Rate                         84                       

   2015       

                                                    [facility]     

         

      

 

                    Systolic (mm Hg)                         134                

          2015

                                                    [facility]     

  

             

 

                    Respitory Rate                         18                   

       2015   

                                                    [facility]     

     

          

 

                    Temperature Oral (F)                         98.1 F         

                

2015                                                   [facility]                    

 

                    Diastolic (mm Hg)                         85                

          2015

                                                    [facility]     

  

             

 

                    Systolic (mm Hg)                         139                

          2015

                                                    [facility]     

  

             

 

                    Respitory Rate                         18                   

       2015   

                                                    [facility]     

     

          

 

                    Heart Rate                         90                       

   2015       

                                                    [facility]     

         

      

 

                    Diastolic (mm Hg)                         85                

          2015

                                                    [facility]     

  

             

 

                    Temperature Oral (F)                         98 F           

              

2015                                                   [facility]                    

 

                    Weight                         147.273                      

    2015      

                                                    [facility]     

        

       

 

                    BMI Calculated                         45.28                

          2015

                                                    [facility]     

  

             

 

                    Height                         180.34 cm                    

     2015     

                                                    [facility]     

       

        

 

                    Diastolic (mm Hg)                         74                

          10/23/2014

                                                    [facility]     

  

             

 

                    Systolic (mm Hg)                         133                

          10/23/2014

                                                    [facility]     

  

             

 

                    Heart Rate                         94                       

   10/23/2014       

                                                    [facility]     

         

      

 

                    Respitory Rate                         18                   

       10/23/2014   

                                                    [facility]     

     

          

 

                    Temperature Oral (F)                         98.5 F         

                

10/23/2014                                                   [facility]                    

 

                    BMI Calculated                         42.91                

          10/23/2014

                                                    [facility]     

  

             

 

                    Weight                         139.545                      

    10/23/2014      

                                                    [facility]     

        

       

 

                    Height                         180.34 cm                    

     10/23/2014     

                                                    [facility]     

       

        

 

                    Temperature Oral (F)                         97.2 F         

                

10/03/2014                                                   [facility]                    

 

                    Diastolic (mm Hg)                         70                

          10/03/2014

                                                    [facility]     

  

             

 

                    Systolic (mm Hg)                         122                

          10/03/2014

                                                    [facility]     

  

             

 

                    BMI Calculated                         43.33                

          10/03/2014

                                                    [facility]     

  

             

 

                    Weight                         140.909                      

    10/03/2014      

                                                    [facility]     

        

       

 

                    Height                         180.34 cm                    

     10/03/2014     

                                                    [facility]     

       

        

 

                    Temperature Oral (F)                         98.8 F         

                

2014                                                   [facility]                    

 

                    Heart Rate                         82                       

   2014       

                                                    [facility]     

         

      

 

                    Respitory Rate                         18                   

       2014   

                                                    [facility]     

     

          

 

                    Systolic (mm Hg)                         104                

          2014

                                                    [facility]     

  

             

 

                    Diastolic (mm Hg)                         65                

          2014

                                                    [facility]     

  

             

 

                    Temperature Oral (F)                         98.1 F         

                

2014                                                   [facility]                    

 

                    Heart Rate                         76                       

   2014       

                                                    [facility]     

         

      

 

                    Respitory Rate                         18                   

       2014   

                                                    [facility]     

     

          

 

                    Diastolic (mm Hg)                         64                

          2014

                                                    [facility]     

  

             

 

                    Systolic (mm Hg)                         129                

          2014

                                                    [facility]     

  

             

 

                    Diastolic (mm Hg)                         60                

          2014

                                                    [facility]     

  

             

 

                    Systolic (mm Hg)                         101                

          2014

                                                    [facility]     

  

             

 

                    Respitory Rate                         18                   

       2014   

                                                    [facility]     

     

          

 

                    Temperature Oral (F)                         98.9 F         

                

2014                                                   [facility]                    

 

                    Heart Rate                         80                       

   2014       

                                                    [facility]     

         

      

 

                    Height                         177.8 cm                     

    2014      

                                                    [facility]     

        

       

 

                    BMI Calculated                         44.29                

          2014

                                                    [facility]     

  

             

 

                    Weight                         140                          

2014          

                                                    [facility]     

            

   

 

                    Weight                         136.364                      

    2014      

                                                    [facility]     

        

       

 

                    Height                         175.26 cm                    

     2014     

                                                    [facility]     

       

        

 

                    BMI Calculated                         44.4                 

         2014 

                                                    [facility]     

   

            

 

                    Diastolic (mm Hg)                         94                

          2014

                                                    [facility]     

  

             

 

                    Systolic (mm Hg)                         142                

          2014

                                                    [facility]     

  

             

 

                    Respitory Rate                         18                   

       2014   

                                                    [facility]     

     

          

 

                    Heart Rate                         85                       

   2014       

                                                    [facility]     

         

      

 

                    Temperature Oral (F)                         97.8 F         

                

2014                                                   [facility]                    

 

                    Respitory Rate                         18                   

       2014   

                                                    [facility]     

     

          

 

                    Heart Rate                         79                       

   2014       

                                                    [facility]     

         

      

 

                    Systolic (mm Hg)                         121                

          2014

                                                    [facility]     

  

             

 

                    Diastolic (mm Hg)                         68                

          2014

                                                    [facility]     

  

             

 

                    Temperature Oral (F)                         99.1 F         

                

2014                                                   [facility]                    

 

                    Respitory Rate                         18                   

       2014   

                                                    [facility]     

     

          

 

                    Systolic (mm Hg)                         114                

          2014

                                                    [facility]     

  

             

 

                    Diastolic (mm Hg)                         66                

          2014

                                                    [facility]     

  

             

 

                    Temperature Oral (F)                         99.3 F         

                

2014                                                   [facility]                    

 

                    Heart Rate                         88                       

   2014       

                                                    [facility]     

         

      

 

                    Temperature Oral (F)                         97.8 F         

                

2014                                                   [facility]                    

 

                    Heart Rate                         84                       

   2014       

                                                    [facility]     

         

      

 

                    Respitory Rate                         18                   

       2014   

                                                    [facility]     

     

          

 

                    Systolic (mm Hg)                         123                

          2014

                                                    [facility]     

  

             

 

                    Diastolic (mm Hg)                         71                

          2014

                                                    [facility]     

  

             



                                                                                
                                                                                
                                                                                
                                                                                
                                                                                
                                                                                
                                                                                
                                                                                
                                                                                
                                                                                
                                                                                
                                                                                
                                                                                
                                                                                
                                                                                
                                                                                
                                                                                
                                                        



Encounters

                    



                    Location                         Location Details           

              

Encounter Type                         Encounter Number                         

Reason For Visit                         Attending Provider                     

                    ADM Date                         DC Date                    

     Status     

                                        Source                    

 

                    [facility] 

Emergency Center                         [id number]                           

                          Amber Vergara                          2014                                                  

[facility] 

Emergency Center                         [id number]                           

                          Chuck Singh                          2014                                                  

[facility]                                   

               

Inpatient                         [id number]                                  

                          Melanie Quinteros                          2014                                                  

[facility]                                   

               OBS 

Observation Patient                         [id number]                        

                                             Melanie Quinteros                      

    10/03/2014     

                    10/03/2014                                                  

[facility]                                   

               OBS 

Observation Patient                         [id number]                        

                                             Melanie Quinteros                      

    10/23/2014     

                    10/23/2014                                                  

[facility]                                   

               

Inpatient                         [id number]                                  

                          Melanie Quinteros                          2015                                                  

[facility]               

                          

                          Bedded Outpatient                         [id number]

                 

                                             Ciro Mcclain                       

   

2019                                   

                                        [facility]                    

 

                    Oncology                                                  Re

curring             

                    [id number]                                                

  

Effrosyni Apostolidou                          2019                                                  

[facility]                    

 

                    Oncology                                                  Re

curring             

                    [id number]                                                

  

Effrosyni Apostolidou                          2019                                                  

[facility]                       

                          

                    Outpatient                         [id number]             

                   

                          Shayy Teran                          2020                                                  

 [facility]                    

 

                          [facility]                       

                          

                          Outpt Diag Services                         [id number]

00                       

                                             Shayy Teran                      

    2020                                            

     

                                         [facility]                    

 

                          [facility]                       

                          

                          Outpt Diag Services                         [id number]

02                       

                                             Shayy Teran                      

    2020                                            

     

                                         [facility]                    



                                                                                
                                                                                
                                            



Procedures

                    



                    Procedure                         Code                      

   Date             

                    Perfomer                         Comments                   

      

Source                    

 

                     section                         [id number]           

                    

                                                                      [facility], [facility]                    

 

                    Tubal ligation                         [id number]             

                    

                                                                      [facility]                    



                                                                                



Assessment and Plan

                    



                    Assessment and Plan                         Date            

             Source 

                   

 

                                        Extracted from:Title: OB Postpartum Prog

ress Note

Author: Wendy Moss MD

Date: 1/12/15



Patient:   WANDA MOBLEY            MRN: [id number]            FIN: [id number]

Age:   24 years     Sex:  Female     :  1990

Associated Diagnoses:   None

Author:   Wendy Moss MD

Subjective

Reports pain is adequately controlled. Denies any s/sx of anemia. AMbulating. PT
 reports having 5 episodes of diarrhea. Denies any fever or chills. Reports 
requiring 5 pads overnight. Reports that she saw a clot yesterday, no clots 
since.

Physical Examination

VS/Measurements

Inpatient Vital signs (ST)

Vitals    Tmp(F)    Pulse    BP    RR    SpO2    FIO2

                                         23:49    98.9    83    112/68    1

8    ---    ---

                                         21:41    98.2    86    122/79    1

8    ---    ---

                                         19:30    ----    92    142/89    -

-    ---    ---

                                         18:45    ----    77    132/71    -

-    ---    ---

                                         18:30    ----    89    131/67    -

-    ---    ---



                                        24 Hr Tmax: 98.9F (37.17c) at  23:4

9    Vital Signs are the last 5 in the 

past 48 hours.



gen-NAD

CV-RRR

Lung-CTAB

Abd- soft, nontender, firm fundus below umbilicus

LE- nontender calfs bilaterally

Review / Management

Results review:

Labs (Last four charted values)

WBC                     9.3    (ROXANNA 10)

Hgb                     L 10.3    (ROXANNA 10)

Hct                     L 32.1    (ROXANNA 10)

Plt                     210    (ROXANNA 10)    .

Impression and Plan

                                        24 yr old  s/p TSVD

                                        1. PPD#1: AFVSS, one mild range bp last 

night. Pt has hx of GHTN. Continue to 

monitor closely.

                                        2. Anemia: 10.3 --> EBL 400cc pt denies 

signs/symptoms. Continue to monitor 

closely. Place pt on strict peripad count, given hx of pph and transfusions.

                                        3. Diarrhea: pt is s/p cytotec from deli

very. (could possibly be side affect of 

medication). Lomotil to be given now. Continue monitoring.

                                        4. GHTN: no meds.  1 mild range bp last 

night.  Pt denies symptoms.  UPCR 0.1 

Continue to monitor BP

                                        5. MTHFR deficiency- heterozygote, on no

 meds.

                                        6. O+/Rb NI--> for MMR pp

                                        7. Br&Mauricio/BCM- Nexplanon

                                        8. Dispo: Continue with routine pp care,

 consider d/c in PM.

Wendy Moss MD, PGYI

Addendum by Marty Dominguez MD on 2015 11:52

Attending Note

Pt discussed with OB team, agree with plan.

PPD#1- stable.  Acute on chronic anemia- Hb 9.4, stable, asymptomatic, rx iron 
at discharge.

GHTN- BPs stable, asymptomatic.

RNI--> needs MMR at discharge.

Routine care.

Marty Dominguez MD

Addendum by Tiffanie Clark MD on 2015 22:19

R1 Progress Note

Called to bedside 2/2 allergic reaction on back 2/2 to tape from epidural.

Pt c/o pruitis and requesting a topical cream

Examination showed erythematous wheals, with excoriation marks from scratching 
noted bilaterally on lower back in the distribution of where the tape was.

No vesicles or purulent discharge noted.

Pt requesting topical cream instead of po medication.

Will rx hydrocortisone-aloe cream.

Tiffanie Clark, PGY1



Extracted from:Title: OB H&P

Author: Wendy Moss MD

Date: 1/10/15

 Impression and Plan

A/P: Ms. Mobley is a 24 yr old  at 36wk6d c/w doc 12wk US present for IOL
 2/2 GHTN.

                                        1) IOL- Pt is at 0.5/th/high/med/post wi

th villanueva score of -1. Will ripen with 

cervidil.

                                        2)GHTN- Pt's BP were in mild range in cl

inic, not on any meds. Denies any preE 

symptoms. Baseline UPCR: 0.1. Continue to monitor BP.

                                        3) Pain- WE

                                        4) GBS neg/3THIV neg

                                        5) C/ 2889 grams on sono on 1/7/15 (BPD>

95th%)

                                        6) Hx of PPH in prior deliveries- will k

eep uterotonics at bedside.

                                        7) Anemia- Last Hb: 10.9 on iron daily.

                                        8) FOB's brother has down syndrome--> s/

p G/C, MSAFP wnl, NIPT wnl.

                                        9)MTHFR deficiency- heterozygote, on no 

meds.

                                        10) RH+/Rb NI--> for MMR pp

                                        11) Br&Mauricio/BCM- NExplanon

                                        12) Dispo: admit for IOL , plan to ripen

 with cervidil.

D/W Dr. Cuate Moss MD, PGYI







                          2015                         [facility]                    

 

                                        Extracted from:Title: Clinical Document

Author: Suellen Farris MD

Date: 10/23/14

R2 Triage OB/GYN H&P

LMP: Unsure

EDC: 2015

EGA: 25wk4d

CC: decreased FM

HPI: 22yo  @ 25wk4d d/b doc 9wk kapil, with CHTN and MTHFR deficiency, 
presents with c/o no FM since 8PM last night. Does endorse FM since in the room.
 Denies LOF, VB, and contractions. Endorses &quot;round ligament pain." Does 
endorse HA earlier today, since resolved. Denies RUQ pain or epigastric pain.

PNC:  Dr. Quinteros, Last appointment: 10/8, next appointment: 10/29/14

                                        1. Dated as above

                                        2. Hospitalizations: Hospitalized - with hyperemesis gravidarum. 

Currently taking reglan and phenergan TID and pepcid qd.

                                        3. CHTN - Diagnosed with elevated BPs th

is pregnancy. Baseline PIH panel 

negative and Urine Pr:Cr ratio 0.1. Not on medications.

                                        4. FOB has brother with DS - s/p GC. s/p

 NIPT wnl

                                        5. MTHFR deficiency - Heterozygote. On n

o medications.

                                        6. Rubella Equivocal - Needs MMR postpar

hillary

                                        7. H/o postpartum hemmorrhage - Required

 blood transfusions with two previous 

deliveries

                                        8. PNLs: O+/-, Rub Equivocal, RPR NR, He

p B neg, 1T HIV neg, GC/Chl neg, Affirm 

neg

                                        9. Br / + epidural / Nexplanon

OB Hx:

                                        , Male, 8#10, TSVD, [facility], complic

ated by PPH requiring blood transfusion 

(1U)

                                        , Female, 8#4, TSVD, [facility], compli

cated by PPH requiring blood transfusion

 (3U)

Gyn Hx:

                                         / regular / 7-8 days

No STDs

No abnormal paps

PMH: CHTN, MTHFR deficiency

PSH: None

Meds: PNV, Phenergan, reglan, pepcid, FA

All: Zofran-> Rash, Codeine-> rash

FH: Mom - DM, HTN, Thyroid Disease

SH: Negative x3, previous smoker

PE:

Vitals    Tmp(F)    Tmp(C)    Ttype    BP    MAP    Pulse    RR    SpO2    FIO2 
   ETCO2

                                        10/23 12:20    98.5    36.94    oral    

133/74    ---    94    18    ---    --- 

   ---

                                        24 Hr Tmax: 98.5F (36.94c) at 10/23 12:2

0    24 Hr Tmin: 98.5F (36.94c) at 10/23

 12:20

                                        36 Hr Tmax: 98.5F (36.94c) at 10/23 12:2

0    36 Hr Tmin: 98.5F (36.94c) at 10/23

 12:20

Vital Signs are the last 5 in the past 48 hours.    Weights are the last 5 in 60
 days, plus initial.

Date    Wt(kg)    Wt(lb)    Ht(cm)    Ht(in)    Method    BMI    BSA

                                        10/23 (initial)    139.54     307.00    

180.34     71.00    Stated     42.9    

2.64

GEN: NAD

CV: RRR

RESP: CTAB

ABD: Obese, nontender

EXT: No calf tenderness

FHTs: 150s/ +accels/ mod btbv/ occ small variables

Sono: SIUP, +gross and fine FM, SHELBY wnl

A/P: 22yo  @ 25wk4d d/b doc 9wk sono, with CHTN and MTHFR deficiency, 
presents with c/o no FM

                                        1. No FM: Ptnt reports no FM since 8PM l

ast night however reports that since she

 has been in triage she has felt the baby moving much better.  Showed ptnt fetal
 heart beat on monitor and FHTs and movement on sono and ptnt feeling much 
better reporting she can feel the baby kicking her now. Precautions given. Will 
D/C to home, to f/u this week with Dr. Quinteros at scheduled appointment.

                                        2. CHTN - Diagnosed with elevated BPs th

is pregnancy. Baseline PIH panel 

negative and Urine Pr:Cr ratio 0.1. Not on medications. BP wnl today.

                                        3. H/o postpartum hemmorrhage X2 - Requi

red blood transfusions with two previous

 deliveries. Will T&C while in labor.

                                        4. FOB has brother with DS - s/p GC. s/p

 NIPT which was neg per ptnt.

                                        5. MTHFR deficiency - Heterozygote. On n

o medications.

                                        6. Rubella Equivocal - Needs MMR postpar

hillary

                                        7. Br / + epidural / Nexplanon

                                        8. IUP @ 25wk4d: +FHTs very reassuring, 

+FM

                                        9. Ptnt d/w Dr. Hawkins who agrees with the

 above plan

Suellen Farris MD, PGY2

                          10/23/2014                         [facility]                    

 

                                        Extracted from:Title: OB H&P Dwaine

Author: Karla Isidro PA-C

Date: 10/3/14

 Impression and Plan

Diagnosis

Pregnancy, S/P FAll.

Maternal condition:  Stable.

Plan

Discharge home.

Education and Follow-up:

     Counseled: Patient.

     Discharge Planning: Plan to discharge ( To home ).

                                        25 yo  at 22w5d by reported 20 we

ek ultrasound who presents to OB triage 

with complaints of slip and fall on her way to school today. Denies VB, ROM or 
contractions. States has lower back and abdominal cramping. Denies abdominal 
trauma. reports +FM. States blood type is O +.

                                        1. Previable IUP

                                        2. S/P fall, No abdominal trauma, RH +

                                        3. FHT's by doppler

                                        4. History of LGA

                                        5. Obesity

                                        6. Discussed with Dr Avery. Discharge sue

e with Precuations.

Karla Isidro PA-C

                          10/03/2014                         [facility]                    

 

                                        Extracted from:Title: Clinical Document

Author: Ashwin Ocampo MD

Date: 14

R2 GYN Note:

S: No complaints. Jorge reg diet, Voiding freely. Denies N/V. Pt strongly desires 
to go home.

O: Vitals    Tmp(F)    Tmp(C)    Ttype    BP    MAP    Pulse    RR    SpO2    
FIO2    ETCO2

                                         04:59    98.8    37.11    oral    

104/65    ---    82    18    ---    --- 

   ---

                                         22:00    98.1    36.72    oral    

129/64    ---    76    18    ---    --- 

   ---

                                         10:00    98.9    37.17    oral    

101/60    ---    80    18    ---    --- 

   ---

                                         04:00    98.9    37.17    oral    

109/66    ---    76    18    ---    --- 

   ---

                                         22:00    98.5    36.94    oral    

100/56    ---    71    18    ---    --- 

   ---

                                        24 Hr Tmax: 98.9F (37.17c) at  10:0

0    24 Hr Tmin: 98.1F (36.72c) at 

 22:00

Gen:NAD

CV:RRR

Pulm:CTAB

Abd:soft, ND, NT, +bs

Ext: calves non-ttp

A/P: 22yo  @ 13w5d by doc 9wk kapil, with CHTN and MTHFR deficiency, 
presents with hyperemesis.

                                        1. Hyperemesis gravidarum- Pt is jorge a r

egular diet overnight with food her 

 brought, on scheduled IV Phenergan and Reglan and Pepcid. No zofran 
secondary to allergy. No nausea or emesis overnight. UA neg for ketones. 
Recommended advancing pt to regular diet this am and transition to PO 
antiemetics, but pt is ademant about leaving AMA if she cannot leave now. 
Discussed not being able to know if she will jorge the PO regiment. Pt verbalizes 
understanding but still wishes to proceed with discharge or she will leave AMA. 
Strict precautions given. AM CBC, CMP, amylase and lipase pending. Admission 
labs were wnl so strongly suspect these labs will be as well. Will f/u on the 
results.

                                        2.  - Voiding freely

                                        3. CHTN - Diagnosed with elevated BPs th

is pregnancy. Baseline PIH panel 

negative and Urine Pr:Cr ratio 0.1. Not on medications.BP wnl.

                                        4. FOB has brother with DS - s/p GC. s/p

 NIPT but results are pending.

                                        5. MTHFR deficiency - Heterozygote. On n

o medications.

                                        6. Rubella Equivocal - Needs MMR postpar

hillary

                                        7. H/o postpartum hemmorrhage - Required

 blood transfusions with two previous 

deliveries

                                        8. IUP at 13w5d- + FHTs on admission. PN

Ls: O+/-, Rub Equivocal, RPR NR, Hep B 

neg, 1T HIV neg, GC/Chl neg, Affirm neg

                                        9. D/C home with strict precautions and 

f/u in 1-2 wks.

                                        10. Pt discussed with PGY-3

Ashwin Ocampo MD

PGY-2

Addendum by Wendy Moss MD on 2014 10:18

DCS number: [id number]

Wendy Moss MD, PGYI

                          2014                         [facility]                    



                                                                                
             



Plan of Care





                                        No Data Provided for This Section       

             



                                                                



Social History

                    



                    Social History                         Date                 

        Source      

              

 

                                        Social History TypeResponse

Sexual

Sexually active: Yes.  Partner with STD? No.  History of sexual abuse: No.

Smoking Status

Never smoker; Type: Cigarettes; Exposure to Tobacco Smoke None; Cigarette 
Smoking Last 365 Days No; Reg Smoking Cessation Counseling No

entered on: 2015                         [facility]   

 

                

 

                                        Social History TypeResponse

Sexual

Sexually active: Yes.  Partner with STD? No.  History of sexual abuse: No.

Smoking Status

Never smoker; Type: Cigarettes; Exposure to Tobacco Smoke None; Cigarette 
Smoking Last 365 Days No; Reg Smoking Cessation Counseling No

entered on: 2015                         [facility]                    

 

                                        Social History TypeResponse

Sexual

Sexually active: Yes.  Partner with STD? No.  History of sexual abuse: No.

Smoking Status

Never smoker; Type: Cigarettes; Exposure to Tobacco Smoke None; Cigarette 
Smoking Last 365 Days No; Reg Smoking Cessation Counseling No

entered on: 2015                         [facility]    

 

               



                                                                                
                    



Family History

                    



                                        No Data Provided for This Section       

             



                                                            



Advance Directives

                    



                                        No Data Provided for This Section       

             



                                                            



Functional Status

                    



                                        No Data Provided for This Section

## 2020-05-24 NOTE — DIAGNOSTIC IMAGING REPORT
Exam:  Left Hand Series.



History: Trauma to left hand after punching wall

 

Comparison: None.



Findings: 4 views of the left hand. There is normal bone mineralization. 

Acute transverse fracture through the mid diaphysis of the fifth metacarpal

bone with mild volar angulation of the distal fracture fragment. No

intra-articular extension.

Other bony structures are intact. The joint spaces are normal. No abnormal soft

tissue calcification or mass.  No cystic erosive changes.

Soft tissue swelling in the ulnar aspect of the hand.



Impression:



1. Acute, transverse fracture of the fifth metacarpal metadiaphysis with mild

volar angulation of the distal fracture fragment, and associated soft tissue

swelling.







Signed by: Dr. Arie Alberts M.D. on 5/24/2020 12:31 PM

## 2020-05-24 NOTE — XMS REPORT
Summary of Care

                             Created on: 2020



WANDA ARITA

External Reference #: [id number]

: 1990

Sex: Female



Demographics





                          Address                   [address] [place]  [address]

 

                          Preferred Language        English

 

                          Marital Status            Unknown

 

                          [employer] Affiliation     Unknown

 

                          Race                      White

 

                          Ethnic Group              Non-





Author





                          Author                    [facility]

 

                          Organization              [facility]

 

                          Address                   [address] [place]  [address]



 

                          Phone                     Unavailable







Care Team Providers





                    Care Team Member Name Role                Phone

 

                    SONIA ANTOINE,  FEROZ Unavailable         Unavailable

 

                    KAMALJIT BRANDT,  ALEJANDRO ARROYO  Unavailable         Unavailable

 

                    Jef Ortega MD Unavailable         Unavailable

 

                    ASHELY BRANDT [facility],  JONG Unavailable         Unavailable

 

                    TERESA BRANDT,  PABLO WALKER Unavailable         Unavailable

 

                    KAILEY BRANDT,  JAMEY ARROYO Unavailable         Unavailable

 

                    Mark BRANDT,  Salvador Unavailable         Unavailable

 

                          Unavailable               Unavailable







Functional Status





                    Name                Dates               Details

 

                                        Functional status health issues are not 

documented

                                                    Status: 







                    Name                Dates               Details

 

                                        Cognitive status health issues are not d

ocumented

                                                    Status: 







Problems





                    Name                Dates               Details

 

                                        Encounter for routine gynecological exam

ination with Papanicolaou smear of 

cervix (V72.31, Z01.419) 

                                                    Status: Active

 

                                        Breast mass in female (611.72, N63.0) 

                                                    Status: Active

 

                                        Encounter for blood test for routine gen

eral physical examination (V72.62, 

Z00.00) 

                                                    Status: Active

 

                                        Pelvic pain in female (625.9, R10.2) 

                                                    Status: Active

 

                                        Encounter for screening examination for 

sexually transmitted disease (V74.5, 

Z11.3) 

                                                    Status: Active

 

                                        Former smoker (V15.82, Z87.891) 

                                                    Status: Active

 

                                        Morbid obesity (278.01, E66.01) 

                                                    Status: Active

 

                                        Need for rubella vaccination (V04.3, Z23

) 

                                                    Status: Active

 

                                        General counseling and advice for contra

ceptive management (V25.09, Z30.09) 

                                                    Status: Active

 

                                        Constipation (564.00, K59.00) 

                                                    Status: Active

 

                                        Elevated blood pressure reading without 

diagnosis of hypertension (796.2, R03.0)



                                                    Status: Active

 

                                        Acid reflux (530.81, K21.9) 

                                                    Status: Active

 

                                        Morbid obesity with body mass index (BMI

) of 45.0 to 49.9 in adult (278.01, 

E66.01) 

                                                    Status: Active

 

                                        Epistaxis (784.7, R04.0) 

                                                    Status: Active

 

                                        Type 2B von Willebrand's disease (286.4,

 D68.0) 

                                                    Status: Active

 

                                        Menorrhagia with irregular cycle (626.2,

 N92.1) 

                                                    Status: Active







Medications





                    Name                Dates               Details

 

                                        Tranexamic Acid 650 MG Oral Tablet

Take 2 tablets po every 8 hours starting on day 1 of menses for a total of 5 
days



 

                                         Quantity: 30









FEROZ SCOTT N.P. * 



                                         Start : 18-Sep-2019





Active

Iron 100 Plus 100-250-0.025-1 MG Oral Tablet

* 



                                         Refills: 0







* 



                                         Start : 2020





Active





Allergies and Adverse Reactions





                    Name                Dates               Details

 

                    Codeine Derivatives (Allergy)                     Status: Ac

tive



 

                    Zofran TABS (Allergy)                     Reaction: Rash, It

anival

Status: Active









Past Medical History





                    Name                Dates               Details

 

                                        History of Anemia complicating pregnancy

 (648.20, O99.019) 

                                                    Status: Resolved

 

                                        History of High-risk pregnancy supervisi

on (V23.9, O09.90) 

                                                    Status: Resolved

 

                                        History of hyperemesis gravidarum (V13.2

9, Z87.59) 

                                                    Status: Resolved

 

                                        History of Hypertension complicating pre

gnancy, unspecified trimester (642.90, 

O16.9) 

                                                    Status: Resolved

 

                                        History of MTHFR mutation (V12.3, Z15.89

) 

                                                    Status: Resolved

 

                                        History of nausea (V12.79, Z87.898) 

                                                    Status: Resolved

 

                                        History of Nexplanon insertion (V25.5, Z

30.017) 

                                                    Status: Resolved







Procedures





                    Procedure           Dates               Details

 

                    . [facility] - PAP      Date: 2020     

 

                    . [facility] - Affirm VPIII (BV Panel) Date: 2020     

 

                    [LH] TSH+Free T4    Date: 2020     

 

                    [QH] HIV AB, HIV 1/2, EIA, WITH REFLEXES Date: 2020   

  

 

                    US Breast  Unilat 85009 Date: 2020     

 

                    CT Abdomen/Pelvis w/wo contrast 82769 Date: 2020     

 

                    MA Digital Mammo DX Charlie  Date: 2020     

 

                    US Pelvis with Pelvis Transvaginal 38155 Date: 2020   

  

 

                    History of Tubal Ligation                     Completed 



 

                    History of  Section                     Completed 









Immunization





                    Name                Dates               Details

 

                                        Tdap (Adacel) #1

Lot #: [id number]            on: 3-Sep-2014             

 

                                        Fluzone Quadrivalent 0.5 ML Intramuscula

r Suspension #1

Lot #: [id number]            on: 3-Sep-2014             

 

                                        Tdap (Boostrix) #1

Lot #: [id number]            on: 2014            







Family History





                    Name                Dates               Details

 

                                        Family history of essential hypertension

 (V17.49, Z82.49) 

                                                    Status: Active

 

                                        Family history of thyroid disease (V18.1

9, Z83.49) 

                                                    Status: Active

 

                                        Family history of type 2 diabetes [facility] (V18.0, Z83.3) 

                                                    Status: Active







Social History





                    Name                Dates               Details

 

                                        -

                                                    Status: 







                    Name                Dates               Details

 

                    Former smoker                            

 

                    Former smoker                            







Vital Signs





                Date            Test            Result          Details

 

                                                                 

 

                    No Known Vitals to report                      







Results





                Date            Description     Value           Details

 

                    Results not documented                      

 

                                                                 







Plan of Care





                    Name                Dates               Details

 

                                        Planned Observations 

 

                    Planned Goals not documented                      

 

                                        Planned Encounters 

 

                                        Appointment; JONG LUND M.D.

                                        On: 2020 15:00



 

                                        Appointment; ADULT, HEMOPHILIA

                                        On: 3-Jeremy-2020 9:00









Instructions





                    Name                Dates               Details

 

                                        Instructions not documented

                                                     







Encounters





                                        Appointment; PABLO MOORE M.D. 

Encounter Diagnosis: Problem not documented

                                        On: 2018 13:30



 

                                        Appointment; [address] 

Encounter Diagnosis: Problem not documented

                                        On: 2018 14:00



 

                                        Appointment; PABLO MOORE M.D. 

Encounter Diagnosis: Problem not documented

                                        On: 2019 8:00



 

                                        Appointment; SALVADOR LOPEZ M.D . 

Encounter Diagnosis: Problem not documented

                                        On: 2019 9:00



 

                                        Appointment; ADULT, HEMOPHILIA 

Encounter Diagnosis: Problem not documented

                                        On: 18-Sep-2019 9:00



 

                                        Appointment; ADULT, HEMOPHILIA 

Encounter Diagnosis: Problem not documented

                                        On: 4-Dec-2019 9:00



 

                                        Appointment; JONG LUND M.D. 

Encounter Diagnosis: Problem not documented

                                        On: 2020 14:15



 

                                        Appointment; ADULT, HEMOPHILIA 

Encounter Diagnosis: Problem not documented

                                        On: 3-Richi-2020 9:00



 

                                        Appointment; ADULT, HEMOPHILIA 

Encounter Diagnosis: Problem not documented

                                        On: 2020 12:00

## 2020-05-24 NOTE — XMS REPORT
Patient Summary Document

                             Created on: 2020



WANDA ARITA

External Reference #: [id number]

: 1990

Sex: Female



Demographics





                          Address                   [address]

[place]  [address]

 

                          Home Phone                [phone]

 

                          Preferred Language        English

 

                          Marital Status            Unknown

 

                          [employer] Affiliation     [id number]

 

                          Race                      Unknown

 

                                        Additional Race(s) 

White



Other



 

                          Ethnic Group              Unknown





Author





                          Author                    [facility]

t

 

                          Organization              [facility]

 

                          Address                   [address]

[place]  [address]



 

                          Phone                     Unavailable







Support





                Name            Relationship    Address         Phone

 

                No,  Contact    ECON            Unknown         [phone]







Care Team Providers





                    Care Team Member Name Role                Phone

 

                    ROBERT Gore MD PCP                 [phone]

 

                    ADULT,  HEMOPHILIA  Attphys             Unavailable

 

                    SHAYY TERAN M.D. Attphys             Unavailable

 

                    Can Teran Attphys             [phone]

 

                    Salvador Flores Attphys             [phone]

 

                    SALVADOR FLORES M.D. Attphys             Unavailabl

e

 

                    Angelo Moore Attphys             [phone]

 

                    PABLO MOORE M.D. Attphys             Unavailable

 

                    [address]    Attphys             Unavailable

 

                    MARK Quinteros    Attphys             [phone]

 

                    Mian Singh Attphys             [phone]

 

                    KRYSTIN Vergara   Attphys             [phone]

 

                    MARK Quinteros    Admphys             [phone]







Problems





           Condition Name Condition Details Condition Category Status     Onset 

Date Resolution

Date            Last Treatment Date Treating Clinician Comments        Source

 

                          LABS                                              LABS

                        Active                 

      2019                                                                
                               [facility]                     

Diagnosis Active    2019 00:00:00           2019 11:24:00           

          [facility]

 

                          ANEMIA                                            ANEM

IA                        Active             

          2019                                                            
                                   [facility]                     

Diagnosis Active    2019 00:00:00           2019-09-10 16:02:00           

          [facility]

 

                          [id number], REFLUX                                     4323

5, REFLUX                        

Active                        2018                                        
                                                       [facility]         
           Diagnosis Active  2018 00:00:00         2019 06:01:00    

             [facility]

 

                          Excessive fetal growth affecting management of pregnan

cy, antepartum Excessive 

fetal growth affecting management of pregnancy, antepartum Disease             A

ctive              

2017-10-31 00:00:00                                        Overview: >95%ile at 

28 weeks, growth scans Hema Montanez

 

             Obesity affecting pregnancy Obesity affecting pregnancy Disease    

  Active       

2017-10-20 00:00:00                                                 Overview: An

tenatal testing and growth scan at 36 

weeks.                                  Hema Montanez

 

             Family history of Down syndrome Family history of Down syndrome Dis

ease      Active       

2017 00:00:00                                                     Hema Montanez

 

                          H/O maternal blood transfusion, currently pregnant H/O

 maternal blood 

transfusion, currently pregnant Disease Active  2017 00:00:00             

                    

Hema Montanez

 

                          CHILDBIRTH                                        CHIL

DBIRTH                        Active     

                  10/30/2014                                                    
                                           [facility]              
        Diagnosis Active  2014-10-30 06:00:00         2014-10-30 08:36:00       

          [facility]

 

                          INDUCTION                                         NORMA

CTION                        Active       

                10/30/2014                                                      
                                         [facility]                
        Diagnosis Active  2014-10-30 06:00:00         2015-01-10 21:05:00       

          [facility]

 

                          FALL                                              FALL

                        Active                 

      10/03/2014                                                                
                               [facility]                     

Diagnosis Active    2014-10-03 07:36:00           2014-10-06 12:41:00           

          [facility]

 

                          13 W 4 D                                          13 W

 4 D                        Active         

              2014                                                        
                                       [facility]                  
        Diagnosis Active  2014 17:09:00         2014 11:28:00       

          [facility]

 

                          ABDOMINAL PAIN                                    ABDO

TRACEE PAIN                        

Active                        2014                                        
                                                       [facility]  
                  Diagnosis Active    2014 00:00:00           2014 0

7:36:00            

                                        [facility]

 

                          8 WEEKS PREGNANT/HYPERTENSIVE                         

8 WEEKS 

PREGNANT/HYPERTENSIVE                        Active                        
2014                                                                      
                         [facility]                     Diagnosis  

               

Active     2014 00:00:00            2014 10:28:00                   

    [facility]

 

                          History of Anemia complicating pregnancy History of An

emia complicating 

pregnancy Problem Resolved                                         [facility]

 

                          History of High-risk pregnancy supervision History of 

High-risk pregnancy 

supervision Problem Resolved                                         [facility] 

[facility]

 

       Follow up Follow up Problem Active                                    [facility]

 

        Need for rubella vaccination Need for rubella vaccination Problem Active

                           

                                                    [facility]

ns

 

       Constipation Constipation Problem Active                                 

   [facility]

 

       Morbid obesity Morbid obesity Problem Active                             

       [facility]

 

                          Elevated blood pressure reading without diagnosis of h

ypertension Elevated blood

pressure reading without diagnosis of hypertension Problem Active               

                           

[facility]

 

                          Morbid obesity with body mass index (BMI) of 45.0 to 4

9.9 in adult Morbid 

obesity with body mass index (BMI) of 45.0 to 49.9 in adult Problem      Active 

                                 

                                                            [facility]

 

       Acid reflux Acid reflux Problem Active                                   

 [facility]

 

       History of nausea History of nausea Problem Resolved                     

               [facility]

 

                Menorrhagia with irregular cycle Menorrhagia with irregular cycl

e Problem         Active

                                                                  [facility]

 

                Type 2B von Willebrand's disease Type 2B von Willebrand's diseas

e Problem         Active

                                                                  [facility]

 

       Epistaxis Epistaxis Problem Active                                    [facility]

 

                History of hyperemesis gravidarum History of hyperemesis 

rum Problem         

Resolved                                                          [facility]

 

       Former smoker Former smoker Problem Active                               

     [facility]

 

       Breast mass in female Breast mass in female Problem Active               

                     [facility]

 

       Pelvic pain in female Pelvic pain in female Problem Active               

                     [facility]

 

                          History of  section, unknown scar History of c

esarean section, unknown 

scar    Problem Active                                          [facility]

 

       Bacterial vaginosis Bacterial vaginosis Problem Active                   

                 [facility]

 

                          History of Hypertension complicating pregnancy, unspec

ified trimester History of

Hypertension complicating pregnancy, unspecified trimester Problem      Resolved

                               

                                                            [facility]

 

       History of MTHFR mutation History of MTHFR mutation Problem Resolved     

                               

[facility]

 

                          Unspecified chronic gastritis without bleeding        

                 

Unspecified chronic gastritis without bleeding                                  
                                                             2019         
                                      [facility]                     

Problem                                 2019 13:56:25                     

[facility]

 

                          Morbid (severe) obesity due to excess calories        

                 Morbid 

(severe) obesity due to excess calories                                         
                                                      2019                
                               [facility]                     Problem     

                                    

                2019 13:56:25                                 [facility]

 

                          Anemia (disorder)                                 Anem

ia (disorder)                     

  Resolved                                                Problem               
        2020                                                [facility]                     Problem     

              

Resolved                         2020 00:25:34                       [facility],  [facility], 

[facility]

 

                          Blood coagulation disorder (disorder)                 

        Blood coagulation 

disorder (disorder)                        Active                               
                Problem                        2020                       
                        [facility], [facility]
                    Problem Active                  2020 00:25:34         

        [facility],  [facility], [facility]

 

                          Pre-eclampsia (disorder)                          Pre-

eclampsia (disorder)       

                Active                                                Problem   
                    2020                                                [facility], [facility]                     

Problem   Active                        2020 00:25:34                     

[facility],  [facility], [facility]

 

                          VOMITING ALONE                                    VOMI

TING ALONE                        

Active                                                                          
                                             [facility]            
        Diagnosis Active                  2014 11:28:00                 [facility]

 

                          DECREASED FETAL MOVEMENT                          DECR

EASED FETAL MOVEMENT       

                Active                                                          
                                                             [facility]                     Diagnosis Active                  2014-10-27 09:38:00

                 [facility]

 

                          PROLONGED PREG-UNSP                               PROL

ONGED PREG-UNSP                 

      Active                                                                    
                                                   [facility]      
              Diagnosis Active                  2015-01-10 21:05:00             

    [facility]

 

                          Gastro-esophageal reflux disease with esophagitis     

                    

Gastro-esophageal reflux disease with esophagitis                               
                2019                                                [facility]      
              Problem                   2019 05:09:54 2019 13:56:25 

2019 

13:56:25                                                    [facility]

 

                          Discharge Diagnosis: Pregnancy                        

 Discharge Diagnosis: 

Pregnancy                                                2014                                    
           [facility]                     Problem                  

               2014 

05:00:00     2014 04:07:11 2014 04:07:11                           [facility]







Allergies, Adverse Reactions, Alerts





        Allergy Name Allergy Type Status  Severity Reaction(s) Onset Date Inacti

ve Date 

Treating Clinician        Comments                  Source

 

           Ondansetron Hcl Propensity to adverse reactions to drug Active       

         Rash       2016-10-03

00:00:00                                                        Hema Carcamois

fede

 

             Codeine      Propensity to adverse reactions to drug Active        

            Other (See Comments), 

GI Intolerance 2015 00:00:00                           vomitingvomiting Andrew Montanez

 

           Ondansetron Propensity to adverse reactions to drug Active           

     Rash       2015 

00:00:00                                                        Hema Carcamois

t

 

       Codeine Derivatives Allergy to drug (finding) Active                     

                      [facility]

 

       Zofran TABS Allergy to drug (finding) Active        Rash, Itching        

                     [facility] 

[facility]

 

       codeine codeine Active                                           [facility]

 

       Zofran Zofran Active                                           [facility]







Family History





           Family Member Diagnosis  Comments   Start Date Stop Date  Source

 

           Mother     Family history of essential hypertension                  

                [facility]

 

           Mother     Family history of thyroid disease                         

         [facility]

 

           Mother     Family history of type 2 diabetes mellitus                

                  [facility]

 

           Natural father No Known Problems                                  Brennan Montanez

 

           Natural mother No Known Problems                                  Brennan Montanez







Social History





           Social Habit Start Date Stop Date  Quantity   Comments   Source

 

           History of tobacco use                       Current smoker          

  Hema Montanez

 

           Sex Assigned At Birth                                             Brennan Montanez

 

                Alcohol intake  2018 00:00:00 2018 00:00:00 Current 

non-drinker of 

alcohol (finding)                                   Hema Montanez

 

           Social History 2015 03:37:41 2015 03:37:41               

        [facility]







                Smoking Status  Start Date      Stop Date       Source

 

                Former smoker   2018 00:00:00 2018 00:00:00 Hema Montanez







Medications





             Ordered Medication Name Filled Medication Name Start Date   Stop Da

te    Current 

Medication? Ordering Clinician Indication Dosage     Frequency  Signature (SIG) 

Comments                  Components                Source

 

                    metroNIDAZOLE 500 MG Oral Tablet metroNIDAZOLE 500 MG Oral T

ablet 2020 

00:00:00            Yes       SHAYY TERAN M.D.                               T

MAX ONE TABLET BY MOUTH TWICE A DAY . 

DO NOT DRINK ALCOHOL                                         [facility]

 [facility]

 

                    Tranexamic Acid 650 MG Oral Tablet Tranexamic Acid 650 MG Or

al Tablet 2019

00:00:00            Yes       FEROZ BARRIOS                               T

max 2 tablets po every 8 hours starting

on day 1 of menses for a total of 5 days                                        

 [facility]

 

       ferrous sulfate 325 MG Oral Tablet        2019 20:54:00        Yes 

                               325 mg = 

1 tab, PO, Every Other Day, Take 30 minutes prior to meal, # 15 tab, 0 
Refill(s), other                                            [facility]

 

          ferrous sulfate 325 mg oral enteric coated tablet           2019

 13:34:00           No                   

                                 325 mg = 1 tab, PO, Daily, 0 Refill(s)         

              [facility]

 

       Hydrocortisone-Aloe 0.5% topical cream        2015 15:00:00        

No                                 1 

appl, Route: TOP, BID, Drug form: CRM, Start date: 01/13/15 9:00:00, Duration: 
30 day, Stop date: 02/11/15 17:00:00                                         [facility]

 

       Prenatal Multivitamins oral tablet        2015 15:00:00        No  

                               1 tab, 

Route: PO, Drug Form: TAB, Dosing Weight 147.273, kg, Daily, Start date: 
01/12/15 9:00:00, Duration: 30 day, Stop date: 02/10/15 9:00:00                 

                        [facility]

 

        Docusate Sodium 100 MG Oral Capsule [Colace]         2015 12:57:00

         Yes                              

                100 mg = 1 cap, PO, BID, Constipation, # 20 cap, 0 Refill(s)    

                             [facility]

 

          ferrous sulfate 325 mg oral enteric coated tablet           2015

 12:57:00           Yes                 

                                 325 mg = 1 tab, PO, Daily, # 30 tab, 0 Refill(s

)                       [facility]

 

       ibuprofen 800 mg oral tablet        2015 12:52:00        Yes       

                         800 mg = 1 tab,

 PO, Q8H, Pain Score 6-10, # 30 tab, 0 Refill(s)                                

         [facility]

 

                                        Atropine Sulfate 0.025 MG / Diphenoxylat

e Hydrochloride 2.5 MG Oral Tablet 

[Lomotil]         2015 12:52:00         Yes                               

      2 tab, PO, QID, Loose Stools, 0 

Refill(s)                                                   [facility]

 

                                        Atropine Sulfate 0.025 MG / Diphenoxylat

e Hydrochloride 2.5 MG Oral Tablet 

[Lomotil]         2015 10:33:00         No                                

      Notes: (Same As: Lomotil) MAX Adult

 dose = 8 tabs/day                                          [facility]

ter

 

       M-M-R II        2015 00:00:00        No                            

     Notes: (Same as: M-M-R II)  

(measles-mumps-rubella virus vaccine 0.5 ml INJ VL)  GIVE PRIOR TO DISCHARGE    

                        

                                        [facility]

 

       Acetaminophen        2015 23:22:00        No                       

          Notes: Do not exceed 4 gm/day. 

 (Same as: Tylenol)                                         [facility]

 

       Ibuprofen        2015 23:22:00        No                           

      Notes: (Same as: Motrin) "Do Not 

Crush"  Take with food.                                         [facility]

 

       Bisacodyl        2015 23:22:00        No                           

      Notes: (Same As: Dulcolax, 

Correctol) (Do Not Crush)  "Do Not Crush"                                       

  [facility]

 

     lanolin topical      2015 23:22:00      No                       Note

s: (Same as:Lanolin)           

[facility]

 

     zolpidem      2015 23:22:00      No                       Notes: (Aníbal

e As: Ambien)           [facility]

 

        Benzocaine 200 MG/ML Topical Spray [Dermoplast]         2015 23:22

:00         No                       

                          Notes: (Same As: Dermoplast)  FOR EXTERNAL USE ONLY   

                        [facility]

 

       Docusate        2015 23:22:00        No                            

     Notes: (Same as: Colace) (Do Not 

Crush)                                                      [facility]

ter

 

      Methylergonovine       2015 23:22:00       No                       

     Notes: (Same as:Methergine) 

                                                    [facility]

 

       Oxytocin 0.03 UNT/ML Injectable Solution        2015 23:22:00      

  No                                 

Notes: (Same as: OXYTOCIN-D5LR)                                         [facility]

 

       Lactated Ringers IV 1,000 mL        2015 23:22:00        No        

                         1,000 mL, Rate: 

100 ml/hr, Infuse over: 10 hr, Route: IV, Dosing Weight 147.273 kg, Total 
Volume: 1,000, Start date: 01/11/15 17:22:00, Duration: 30 day, Stop date: 
02/10/15 17:21:00                                           [facility]

ter

 

       Oxytocin 0.03 UNT/ML Injectable Solution        2015 18:46:00      

  No                                 

Notes: (Same as: OXYTOCIN-D5LR)                                         [facility]

 

       Remove - dinoprostone (Cervidil) insert        2015 18:00:00       

 No                                 

Notes: Vaginal insert: to be removed 1 hour prior to oxytocin administration or 
12 hours after insertion.                                         [facility]

 

     Cervidil      2015 05:12:00      No                       Notes: (Aníbal

e as: Cervidil)           [facility]

 

     Carboprost      2015 05:00:00      No                       Notes: (S

josé As: Hemabate)           [facility]

 

       Citric Acid / sodium citrate        2015 05:00:00        No        

                         Notes: (Same As:

 Bicitra, Cytra-2) Sodium citrate-citric acid (500-334 mg/5 mL): 1 mL contains 
sodium 1 mEq/mL and bicarbonate 1 mEq/mL                                        

 [facility]

 

       Misoprostol        2015 05:00:00        No                         

        Notes: (Same as:Cytotec)   Take 

with food                                                   [facility]

ter

 

      Methylergonovine       2015 05:00:00       No                       

     Notes: (Same as:Methergine) 

                                                    [facility]

 

       Famotidine        2015 05:00:00        No                          

       Notes: (Same as: Pepcid) Can be 

dilute in 5-10cc NS  IVP: Slow IV push over at least 2 minutes.                 

                        [facility]

 

     Butorphanol      2015 04:13:00      No                       Notes: (

Same As: Stadol)           [facility]

 

       Terbutaline        2015 04:13:00        No                         

        Notes: **DO NOT USE IN OB/GYN 

AREA**  (Same As: Brethine)                                         [facility]

 

           Lidocaine Hydrochloride 10 MG/ML Injectable Solution             04:13:00            No         

                                        Notes: (Same as: Xylocaine)             

        [facility]

 

     Ondansetron      2015 04:13:00      No                       Notes: (

Same as: Zofran)           [facility]

 

                                        Calcium Chloride 0.0014 MEQ/ML / Potassi

um Chloride 0.004 MEQ/ML / Sodium 

Chloride 0.103 MEQ/ML / Sodium Lactate 0.028 MEQ/ML Injectable Solution         

                  

2015 04:13:00           No                                                

1,000 mL, 1,000 ml/hr, Infuse Over: 1 hr, 

Route: IV, 1,000, Drug form: INJ, ONCE, Dosing Weight 147.273 kg, Start date: 
01/10/15 22:13:00, Stop date: 01/10/15 22:13:00, Bolus for regional anesthesia 
per unit protocol                                           [facility]

 

       Lactated Ringers IV 1,000 mL        2015 04:13:00        No        

                         1,000 mL, Rate: 

125 ml/hr, Infuse over: 8 hr, Route: IV, Dosing Weight 147.273 kg, Total Volume:
 1,000, Start date: 01/10/15 22:13:00, Duration: 30 day, Stop date: 02/09/15 
22:12:00                                                    [facility]

 

       Oxytocin 0.03 UNT/ML Injectable Solution        2015 04:13:00      

  No                                 

Notes: (Same as: OXYTOCIN-D5LR)                                         [facility]

 

       Tylenol        2014-10-03 13:52:00        No                             

    Notes: Do not exceed 4 gm/day.  (Same

 as: Tylenol)                                               [facility]

 

       Vitamin B6 25 mg oral tablet        2014 11:41:00        Yes       

                         25 mg = 1 tab, 

PO, Q6H, # 30 tab, 0 Refill(s)                                         [facility]

 

       Metoclopramide 10 MG Oral Tablet [Reglan]        2014 11:41:00     

   Yes                                10

 mg, PO, QID-Before Meals, nausea and vomiting, # 40 tab, 0 Refill(s)           

                              [facility]

 

                Promethazine Hydrochloride 25 MG Oral Tablet [Phenergan]        

         2014 11:41:00  

        Yes                                     25 mg = 1 tab, PO, Q6H, Nausea, 

# 15 tab, 0 Refill(s)                 [facility]

 

       Famotidine 20 MG Oral Tablet [Pepcid]        2014 11:41:00        Y

es                                20 mg 

= 1 tab, PO, BID, # 60 tab, 0 Refill(s)                                         

[facility]

 

       Prenatal 1 Plus 1 oral tablet        2014 11:41:00        Yes      

                          1 tab, PO, 

Daily, # 100 tab, 0 Refill(s)                                         [facility]

 

       Loperamide        2014 08:03:00        No                          

       Notes: (Same as: Imodium)  MAX 

adult dose is 8 caps/day                                         [facility]

 

                                        Ascorbic Acid / Calcium Carbonate / Deven

er Sulfate / Ferrous fumarate / Folic 

Acid / Magnesium Oxide / Niacinamide / Potassium Iodide / pyridoxine / 
Riboflavin / Thiamine / Vitamin A / Vitamin B 12 / Vitamin D / Vitamin E / Zinc 
Sulfate         2014 14:00:00         No                                  

    1 tab, Route: PO, Drug Form: TAB, 

Dosing Weight 136.364, kg, Daily, Start date: 14 9:00:00, Duration: 30 
day, Stop date: 14 9:00:00                                         [facility]

 

       Pepcid        2014 14:00:00        No                              

   Notes: (Same as: Pepcid) Can be dilute

 in 5-10cc NS  IVP: Slow IV push over at least 2 minutes.                       

                  [facility]

 

                                        Calcium Chloride 0.0014 MEQ/ML / Potassi

um Chloride 0.004 MEQ/ML / Sodium 

Chloride 0.103 MEQ/ML / Sodium Lactate 0.028 MEQ/ML Injectable Solution         

                  

2014 11:10:00           No                                                

1,000 mL, 1,000 ml/hr, Infuse Over: 1 hr, 

Route: IV, 1,000, Drug form: INJ, ONCE, Priority: STAT, Dosing Weight 140 kg, 
Start date: 14 6:10:00, Duration: 1 doses or times, Stop date: 14 6
:10:00                                                      [facility]

ter

 

     Vitamin B6      2014 05:00:00      No                       Notes: (S

josé as: Vitamin B6)           

[facility]

 

     Reglan      2014 05:00:00      No                       Notes: (Same 

as: Reglan)           [facility]

 

       Phenergan        2014 05:00:00        No                           

      Notes: Do not give IV push.  (Same 

as: Phenergan)                                              [facility]

ter

 

       D5LR 1,000 mL        2014 00:13:00        No                       

          1,000 mL, Rate: 125 ml/hr, 

Infuse over: 8 hr, Route: IV, Dosing Weight 136.364 kg, Total Volume: 1,000, 
Start date: 14 19:13:00, Duration: 30 day, Stop date: 14 19:12:00   

                        

                                        [facility]

 

       Tylenol        2014 01:57:00        No                             

    650 mg, Route: PO, Drug form: TAB, 

ONCE, Dosing Weight 120.909, kg, Priority: STAT, Start date: 14 20:57:00, 
Stop date: 14 20:57:00                                         [facility]







Immunizations





           Ordered Immunization Name Filled Immunization Name Date       Status 

    Comments   Source

 

           FLUZONE QUAD PF            2017 00:00:00 Completed             

[place] [facility]

 

           Tdap (Boostrix)            2014 00:00:00 Completed             

[facility]

 

           Tdap                  2014 00:00:00 Completed             [facility]

 

                Fluzone Quadrivalent 0.5 ML Intramuscular Suspension            

     2014 00:00:00 

Completed                                           [facility]

ns

 

           Tdap (Adacel)            2014 00:00:00 Completed             [facility]

 

           Tdap                  2014 00:00:00 Completed             [place]

[facility]







Vital Signs





             Vital Name   Observation Time Observation Value Comments     Source

 

                Systolic blood pressure 2020 13:19:00 135 mm[Hg]      Loca

tion: RUE; Position: 

Sitting                                 [facility]

 

                Diastolic blood pressure 2020 13:19:00 86 mm[Hg]       Loc

ation: RUE; Position: 

Sitting                                 [facility]

 

             Body height  2020 13:19:00 70 [in_us]                [facility]

 

             Weight       2020 13:19:00 338 [lb_av]               [facility]

ty [facility]

 

             Body mass index (BMI) [Ratio] 2020 13:19:00 48.5 kg/m2       

         [facility]

 

             Body temperature 2020 13:19:00 98.5 [degF]  Method: Oral Univ

[facility]

 

             Heart Rate   2020 13:19:00 88 /min                   [facility]

ty [facility]

 

             BP Systolic  2020 13:55:00 137 mm[Hg]   Location: RUE; [facility]

on: Sitting 

[facility]

 

             BP Diastolic 2020 13:55:00 86 mm[Hg]    Location: RUE; [facility]

on: Sitting 

[facility]

 

             Height       2020 13:55:00 70 [in_us]                Universi

ty [facility]

 

             Weight       2020 13:55:00 333.375 [lb_av]              [facility]

 

             Body Mass Index Calculated 2020 13:55:00 47.83 kg/m2         

      [facility]

 

             Temperature  2020 13:55:00 98.3 [degF]  Method: Oral Universi

ty [facility] 

[facility]

 

             Heart Rate   2020 13:55:00 76 /min                   [facility]

ty [facility]

 

             BP Systolic  2019 08:21:00 136 mm[Hg]                [facility]

ty [facility]

 

             BP Diastolic 2019 08:21:00 84 mm[Hg]                 [facility]

ty [facility]

 

             Height       2019 08:21:00 70 [in_us]                [facility]

ty [facility]

 

             Weight       2019 08:21:00 150.59 kg                 [facility]

[facility]

 

             Body Mass Index Calculated 2019 08:21:00 47.64 kg/m2         

      [facility]

 

             Temperature  2019 08:21:00 99 [degF]                 Universi

ty [facility]

 

             Heart Rate   2019 08:21:00 78 /min                   [facility]

ty [facility]

 

             Respiration Rate 2019 08:21:00 18 /min                   [facility]

 

             BP Systolic  2019 08:15:00 130 mm[Hg]   Location: RUE; [facility]

on: Sitting 

[facility]

 

             BP Diastolic 2019 08:15:00 91 mm[Hg]    Location: RUE; [place]

on: Sitting 

[facility]

 

             Height       2019 08:15:00 70 [in_us]                Universi

ty [facility]

 

             Weight       2019 08:15:00 149.68 kg                 [facility]

 

             Body Mass Index Calculated 2019 08:15:00 47.35 kg/m2         

      [facility]

 [facility]

 

             Temperature  2019 08:15:00 98.7 [degF]  Method: Oral [facility] 

[facility]

 

             Heart Rate   2019 08:15:00 75 /min                   [facility]

 

             Respiration Rate 2019 08:15:00 18 /min      [facility]

 

             Systolic (mm Hg) 2019 13:30:00                           [facility]

 

             Diastolic (mm Hg) 2019 13:30:00                           [facility]

 

             Heart Rate   2019 13:30:00                           [facility]

 

             Respitory Rate 2019 13:30:00                           [facility]

 

             Temperature Oral (F) 2019 13:30:00 98.1 F                    

[facility]

 

             Height       2019 13:30:00 175.3 cm                  [facility]

 

             Weight       2019 13:30:00                           [facility]

 

             BMI Calculated 2019 13:30:00                           [facility]

 

             Weight       2019 12:24:00                           [facility]

 

             BMI Calculated 2019 12:24:00                           [facility]

 

             Height       2019 12:24:00 177.8 cm                  [facility]

 

             BP Systolic  2018 13:22:00 110 mm[Hg]   Position: Sitting Uni

[facility]

 [facility]

 

             BP Diastolic 2018 13:22:00 70 mm[Hg]    Position: Sitting Uni

[facility]

 

             Height       2018 13:22:00 69 [in_us]                [facility]

[facility]

 

             Weight       2018 13:22:00 313 [lb_av]               [facility]

 

             Body Mass Index Calculated 2018 13:22:00 46.22 kg/m2         

      [facility]

 [facility]

 

             Heart Rate   2018 13:22:00 84 /min                   [facility]

 

             Heart Rate   2015 15:30:00                           [facility]

 

             Respitory Rate 2015 15:30:00                            [facility]

 

             Systolic (mm Hg) 2015 15:30:00                           [facility]

 

             Diastolic (mm Hg) 2015 15:30:00                           [facility]

 

             Temperature Oral (F) 2015 15:30:00 98 F                      

[facility]

 

             Heart Rate   2015 06:22:00                           [facility]

 

             Systolic (mm Hg) 2015 06:22:00                           [facility]

 

             Respitory Rate 2015 06:22:00                            [facility]

 

             Temperature Oral (F) 2015 06:22:00 98.1 F                    

[facility]

 

             Diastolic (mm Hg) 2015 06:22:00                           [facility]

 

             Systolic (mm Hg) 2015 23:44:00                           [facility]

 

             Respitory Rate 2015 23:44:00                            [facility]

 

             Heart Rate   2015 23:44:00                           [facility]

 

             Diastolic (mm Hg) 2015 23:44:00                           [facility]

 

             Temperature Oral (F) 2015 23:44:00 98 F                      

[facility]

 

             Weight       2015 03:31:00                           [facility]

 

             BMI Calculated 2015 03:31:00                            [facility]

 

             Height       2015 03:31:00 180.34 cm                 [facility]

 

             Diastolic (mm Hg) 2014-10-23 17:20:00                           [facility]

 

             Systolic (mm Hg) 2014-10-23 17:20:00                           [facility]

 

             Heart Rate   2014-10-23 17:20:00                           [facility]

 

             Respitory Rate 2014-10-23 17:20:00                            [facility]

 

             Temperature Oral (F) 2014-10-23 17:20:00 98.5 F                    

[facility]

 

             BMI Calculated 2014-10-23 17:19:00                            [facility]

 

             Weight       2014-10-23 17:19:00                           [facility]

 

             Height       2014-10-23 17:19:00 180.34 cm                 [facility]

 

             Temperature Oral (F) 2014-10-03 13:15:00 97.2 F                    

[facility]

 

             Diastolic (mm Hg) 2014-10-03 13:15:00                           [facility]

 

             Systolic (mm Hg) 2014-10-03 13:15:00                           [facility]

 

             BMI Calculated 2014-10-03 12:43:00                            [facility]

 

             Weight       2014-10-03 12:43:00                           [facility]

 

             Height       2014-10-03 12:43:00 180.34 cm                 [facility]

 

             Temperature Oral (F) 2014 09:59:00 98.8 F                    

[facility]

 

             Heart Rate   2014 09:59:00                           [facility]

 

             Respitory Rate 2014 09:59:00                            [facility]

 

             Systolic (mm Hg) 2014 09:59:00                           [facility]

 

             Diastolic (mm Hg) 2014 09:59:00                           [facility]

 

             Temperature Oral (F) 2014 03:00:00 98.1 F                    

[facility]

 

             Heart Rate   2014 03:00:00                           [facility]

 

             Respitory Rate 2014 03:00:00                            [facility]

 

             Diastolic (mm Hg) 2014 03:00:00                           [facility]

 

             Systolic (mm Hg) 2014 03:00:00                           [facility]

 

             Diastolic (mm Hg) 2014 15:00:00                           [facility]

 

             Systolic (mm Hg) 2014 15:00:00                           [facility]

 

             Respitory Rate 2014 15:00:00                            [facility]

 

             Temperature Oral (F) 2014 15:00:00 98.9 F                    

[facility]

 

             Heart Rate   2014 15:00:00                           [facility]

 

             Height       2014 00:43:00 177.8 cm                  [facility]

 

             BMI Calculated 2014 00:43:00                            [facility]

 

             Weight       2014 00:43:00                           [facility]

 

             Weight       2014 00:16:00                           [facility]

 

             Height       2014 00:16:00 175.26 cm                 [facility]

 

             BMI Calculated 2014 00:16:00                            [facility]

 

             Diastolic (mm Hg) 2014 00:16:00                           [facility]

 

             Systolic (mm Hg) 2014 00:16:00                           [facility]

 

             Respitory Rate 2014 00:16:00                            [facility]

 

             Heart Rate   2014 00:16:00                           [facility]

 

             Temperature Oral (F) 2014 00:16:00 97.8 F                    

[facility]

 

             Respitory Rate 2014 05:02:00                            [facility]

 

             Heart Rate   2014 05:02:00                           [facility]

 

             Systolic (mm Hg) 2014 05:02:00                           [facility]

 

             Diastolic (mm Hg) 2014 05:02:00                           [facility]

 

             Temperature Oral (F) 2014 05:02:00 99.1 F                    

[facility]

 

             Respitory Rate 2014 02:47:00                            [facility]

 

             Systolic (mm Hg) 2014 02:47:00                           [facility]

 

             Diastolic (mm Hg) 2014 02:47:00                           [facility]

 

             Temperature Oral (F) 2014 02:47:00 99.3 F                    

[facility]

 

             Heart Rate   2014 02:47:00                           [facility]

 

             Temperature Oral (F) 2014 00:50:00 97.8 F                    

[facility]

 

             Heart Rate   2014 00:50:00                           [facility]

 

             Respitory Rate 2014 00:50:00                            [facility]

 

             Systolic (mm Hg) 2014 00:50:00                           [facility]

 

             Diastolic (mm Hg) 2014 00:50:00                           [facility]







Procedures





                Procedure       Date / Time Performed Performing Clinician Sourc

e

 

                [QLH] CBC (INCLUDES DIFF/PLT) 2020 00:00:00               

  [facility]

 

                [H] Iron, TIBC \T\ Ferritin 2020 00:00:00                 

[facility]

 

                [H] Von Willebrand Factor Multimers 2020 00:00:00         

        [facility]

 

                [QLH] CMP W/EGFR 2020 00:00:00                 [facility] 

[facility]

 

                [LH] TSH+Free T4 2020 00:00:00                 [facility]

 

                [[facility]] CBC (INCLUDES DIFF/PLT) 2020 00:00:00               

  [facility]

 

                [[facility]] HEMOGLOBIN A1c 2020 00:00:00                 [facility]

 

                [[facility]] LIPID PANEL 2020 00:00:00                 [facility]

 

                . [facility] - Affirm VPIII (BV Panel) 2020 00:00:00          

       [facility]

 

                . [facility] - PAP  2020 00:00:00                 Adrian o

[facility]

 

                [Q] HEPATITIS B SURFACE ANTIGEN W/REFL CONFIRM 2020 00:00

:00                 [facility]

 

                [] HIV AB, HIV 1/2, EIA, WITH REFLEXES 2020 00:00:00    

             [facility]

 

                [[facility]] RPR       2020 00:00:00                 [facility]

 

                [[facility]] HEPATITIS C ANTIBODY 2020 00:00:00                 [facility]

 

                US Pelvis with Pelvis Transvaginal [id number] 2020 00:00:00    

             [facility]

 

                MA Digital Mammo DX Charlie  2020 00:00:00               

  [facility]

 

                 Breast  Unilat [id number] 2020 00:00:00                 [facility]

 

                CT Abdomen/Pelvis w/wo contrast [id number] 2020 00:00:00       

          [facility]

 

                [H] Von Willebrand Factor Multimers 2019 00:00:00         

        [facility]

 

                [H] Iron, TIBC \T\ Ferritin 2019 00:00:00                 

[facility]

 

                History of  Section                                 [facility]

 

                History of Tubal Ligation                                 [facility]

 

                 section                                 [facility],  [facility], [facility]

 

                Tubal ligation                                  [facility],  [facility], [facility]







Plan of Care





             Planned Activity Planned Date Details      Comments     Source

 

                    Future Scheduled Test 2020 00:00:00 INFLUENZA VACCINE 

[code = INFLUENZA 

VACCINE]                                            H[facility] Montanez

 

                    Diagnostic Test Pending 2020 00:00:00 US Pelvis with P

mario Transvaginal 

57320 [code = 60665]                                [facility]

ns

 

                    Diagnostic Test Pending 2020 00:00:00 US Pelvis with P

mario Transvaginal 

44940 [code = 32301]                                [facility]

ns

 

                    Diagnostic Test Pending 2019 00:00:00 [H] Von Willebra

nd Factor Multimers 

[code = [H] Von Willebrand Factor Multimers]                           [facility]

 

                    Future Scheduled Test 2009 00:00:00 Screening for brielle

gnant neoplasm of 

cervix (procedure) [code = 449701137]                           [facility]

 

             Future Appointment 2020 09:00:00 HEMOPHILIA ADULT,           

    [facility]







Encounters





             Start Date/Time End Date/Time Encounter Type Admission Type Attendi

[facility]   Care Department Encounter ID    Source

 

             2020 08:00:00 2020 08:00:00 Appointment; ADULT, HEMOPHI

JORGE LUIS               ADULT,

HEMOPHILIA                UTP                       [facility] T

hrombophilia Center [facility]                    [id number]                  [facility]

ns

 

             2020 13:30:00 2020 13:30:00 Appointment; SHAYY TERAN M.D. JALLOUL, RANDA, M.D. [facility]             [facility] [id number]

[phone]        

[facility]

 

          2020 16:42:00 2020 05:59:00 Outpt Diag Services           

          [facility]   [facility] 

[facility] [place] [id number]               [facility] [place]

 

           2020 10:42:00 2020 23:59:00 Outpatient            Shayy Teran [facility]               [id number]         

 

             2020 12:00:00 2020 12:00:00 Appointment; ADULT, HEMOPHI

JORGE LUIS               ADULT,

HEMOPHILIA      UTP             UTP             [id number]        [facility]

 

          2020 17:42:00 2020 05:59:00 Outpt Diag Services           

          [facility]   [facility] 

[facility] [place] [id number]               [facility] [place]

 

          2020 16:04:00 2020 05:59:00 Outpatient                    

 [facility]   [facility] Outpatient 

[facility] [place]           [id number]               [facility] [place]

 

           2020 11:42:00 2020 23:59:00 Outpatient            Shayy Teran [facility]               [id number]         

 

           2020 10:04:00 2020 23:59:00 Outpatient            Shayy Teran [facility]               [id number]         

 

             2020 09:00:00 2020 09:00:00 Appointment; ADULT, HEMOPHI

JORGE LUIS               ADULT,

HEMOPHILIA                [facility]

hrombophilia [facility]                    [id number]                  [facility]

ns

 

             2020 14:15:00 2020 14:15:00 Appointment; SHAYY TERAN M.D. JALLOUL, RANDA, M.D. [facility] [id number]

[phone]        

[facility]

 

             2019 09:00:00 2019 09:00:00 Appointment; ADULT, HEMOPHI

JORGE LUIS               ADULT,

HEMOPHILIA                [facility]

hrombophilia [facility]                    [id number]                  [facility]

 

        2019 16:14:00 2019 04:59:00 Recurring                 [facility]

T Oncology 

[id number]                            [facility]

 

           2019 11:14:00 2019 23:59:00 Outpatient            Blanquita

idou, Effrosyni 

[facility]               [id number]         

 

             2019 09:00:00 2019 09:00:00 Appointment; ADULT, HEMOPHI

JORGE LUIS               ADULT,

HEMOPHILIA                [facility] T

hrombophilia [facility]                    [id number]                  [facility]

 

        2019 11:14:00 2019 11:14:00 Outpatient                 [facility]    [id number]    [facility]

 

        2019 12:51:00 2019 04:59:00 Recurring                 [facility]

T Oncology 

[id number]                            [facility]

 

           2019 07:51:00 2019 23:59:00 Outpatient            Blanquita

idou, Effrosyni 

[facility]               [id number]         

 

                    2019 09:00:00 2019 09:00:00 Appointment; SALVADOR MAHAJAN M.D. APOSTOLIDOU, EFFROSYNI, M.D. [facility]        UTP        [id number]

7   [facility]

 

        2019 07:51:00 2019 07:51:00 Outpatient                 [facility]    [address]    [facility]

 

          2019 11:50:00 2019 15:45:00 Bedded Outpatient             

        [facility] [id number]              [facility]

 

          2019 05:50:00 2019 09:45:00 Outpatient           [facility]                      [id number]               

 

             2019 08:00:00 2019 08:00:00 Appointment; PABLO MOORE M.D. PRIMOMO, JOHN, M.D. [facility]             UTP             [id number]        [facility]

 

             2018 14:00:00 2018 14:00:00 Appointment; JOJO CARRASCO R D TRUE, KALYN, RD       [facility]             UTP             [id number]        [facility]

 

             2018 13:30:00 2018 13:30:00 Appointment; PABLO MOORE M.D. PRIMOMO, JOHN, M.D. [facility] [id number]        [facility]

 

          2015 02:55:00 2015 18:07:00 Inpatient                     

[facility]                  [id number]              [facility]

 

          2015-01-10 20:55:00 2015 12:07:00 Outpatient           GABY Quinteros [facility]   

[facility]                   [id number]               

 

          2014-10-23 17:07:00 2014-10-23 18:48:00 OBS Observation Patient       

              [facility] [id number]              [facility]

 

          2014-10-23 12:07:00 2014-10-23 13:48:00 Outpatient           GABY Quinteros [facility]   

[facility]                   [id number]               

 

          2014-10-03 12:36:00 2014-10-03 14:54:00 OBS Observation Patient       

              [facility] [id number]              [facility]

 

          2014-10-03 07:36:00 2014-10-03 09:54:00 Outpatient           GABY Quinteros ZAINALFEDE   

[facility]                   [id number]               

 

          2014 22:09:00 2014 12:23:00 Inpatient                     

[facility]                  [id number]              [facility]

 

          2014 17:09:00 2014 07:23:00 Outpatient           [facility]                   [id number]               

 

          2014 00:07:00 2014 04:55:00 [facility]           

          [facility]          [id number]              [facility]

 

           2014 19:07:00 2014 23:55:00 Outpatient            Chuck Singh 

[facility]             [id number]         

 

          2014 21:23:00 2014 05:18:00 [facility]           

          [facility]          [id number]              [facility]

 

          2014 16:23:00 2014 00:18:00 Outpatient           Amber Vergara [facility]                   [id number]               







Results





           Test Description Test Time  Test Comments Results    Result Comments 

Source

 

                    [[facility]] LIPID PANEL   2020 15:05:00   

 

                                        Test Item

 

             CHOLESTEROL, TOTAL; Normal (test code = 2093-3) 184 mg/dl    <200  

       N             

 

             HDL CHOLESTEROL; Below Low Threshold (test code = 2085-9) 47 mg/dl 

    >50                        

 

             TRIGLYCERIDES; Above High Threshold (test code = 2571-8) 155 mg/dl 

   <150                       

 

                LDL-CHOLESTEROL; Above High Threshold (test code = 30773-9) 110 

{MG/DL  JOELLE}                  

                                        Reference range: <100 Desirable range <1

00 mg/dL for primary prevention;  <70 

mg/dL for patients with CHD or diabetic patients with > or = 2 CHD risk factors.
 LDL-C is now calculated using the Falguni calculation, which is a 
validated novel method providing better accuracy than the Friedewald equation in
 the estimation of LDL-C. Derek TIRADO et al. DAVE. 2013;310(19): 7863-3412 (http
://education.[employer].RelateIQ/faq/ZBK547)

 

             CHOL/HDLC RATIO (test code = CHOL/HDLC RATIO) 3.9 {CALC}   <5.0    

     N             

 

             NON HDL CHOLESTEROL (test code = NON HDL CHOLESTEROL) 137 {MG/DL  C

AL} <130                      

For patients with diabetes plus 1 major ASCVD risk factor, treating to a 
non-HDL-C goal of <100 mg/dL (LDL-C of <70 mg/dL) is considered a therapeutic 
option.





[facility][Q] HIV-1/2 Antigen and Antibodies, Fourth 
Generation, with [id number] 15:05:00* 



             Test Item    Value        Reference Range Interpretation Comments

 

             HIV AG/AB, 4TH GEN; Normal (test code = 09239-6) NON-REACTIVE NON-R

EACTIVE N            

HIV-1 antigen and HIV-1/HIV-2 antibodies were notdetected. There is no 
laboratory evidence of HIVinfection. PLEASE NOTE: This information has been 
disclosed toyou from records whose confidentiality may beprotected by state law.
  If your state requires suchprotection, then the state law prohibits you 
frommaking any further disclosure of the informationwithout the specific written
 consent of the personto whom it pertains, or as otherwise permitted by law.A 
general authorization for the release of medical orother information is NOT 
sufficient for this purpose.  For additional information please refer t
ohttp://education.[employer]./faq/KZZ177(This link is being provided 
for informational/educational purposes only.)  The performance of this assay has
 not been clinicallyvalidated in patients less than 2 years old.





[facility][QLH] CMP [id number] 15:05:00* 



             Test Item    Value        Reference Range Interpretation Comments

 

             GLUCOSE; Normal (test code = 1547-9) 84 mg/dl     65-99        N   

         Fasting reference interval

 

             UREA NITROGEN (BUN) (test code = UREA NITROGEN (BUN)) 12 mg/dl     

7-25         N             

 

             CREATININE (test code = CREATININE) 0.68 mg/dl   0.50-1.10    N    

         

 

                          eGFR NON- (test code = eGFR NON-PARRISH

N AMERICAN) 117 

{ML/MIN/1.7}        > OR = 60           N                    

 

                    eGFR  (test code = eGFR ) 13

5 {ML/MIN/1.7}    > OR 

= 60                      N                          

 

             BUN/CREATININE RATIO (test code = BUN/CREATININE RATIO) NOT APPLICA

BLE 6-22                       

 

             SODIUM (test code = SODIUM) 141 mmol/L   135-146      N            

 

 

             POTASSIUM (test code = POTASSIUM) 3.9 mmol/L   3.5-5.3      N      

       

 

             CHLORIDE (test code = CHLORIDE) 104 mmol/L          N        

     

 

             CARBON DIOXIDE (test code = CARBON DIOXIDE) 25 mmol/L    20-32     

   N             

 

             CALCIUM (test code = CALCIUM) 9.3 mg/dl    8.6-10.2     N          

   

 

             PROTEIN, TOTAL (test code = PROTEIN, TOTAL) 7.6 g/dl     6.1-8.1   

   N             

 

             ALBUMIN (test code = ALBUMIN) 3.9 g/dl     3.6-5.1      N          

   

 

             GLOBULIN (test code = GLOBULIN) 3.7 {G/DL  CALC} 1.9-3.7      N    

         

 

                ALBUMIN/GLOBULIN RATIO (test code = ALBUMIN/GLOBULIN RATIO) 1.1 

{CALC}      1.0-2.5         N

                                         

 

             BILIRUBIN, TOTAL; Normal (test code = 21206-8) 0.7 mg/dl    0.2-1.2

      N             

 

             ALKALINE PHSPHATASE (test code = ALKALINE PHSPHATASE) 77 u/l       

       N             

 

             AST; Above High Threshold (test code = 1916-6) 54 u/l       10-30  

                    

 

             ALT; Above High Threshold (test code = 1742-6) 47 u/l       6-29   

                    





[facility]] CBC (INCLUDES DIFF/PLT)2020 15:05:00* 



             Test Item    Value        Reference Range Interpretation Comments

 

                    WHITE BLOOD CELL COUNT (test code = WHITE BLOOD CELL COUNT) 

8.1 {Thousand/u}    

3.8-10.8                  N                          

 

                    RED BLOOD CELL COUNT (test code = RED BLOOD CELL COUNT) 4.66

 {Million/uL}   

3.80-5.10                 N                          

 

             HEMAGLOBIN; Normal (test code = 20563-3) 11.9 g/dl    11.7-15.5    

N             

 

             HEMATOCRIT; Normal (test code = 4544-3) 37.7 %       35.0-45.0    N

             

 

             MCV; Normal (test code = 787-2) 80.9 fL      80.0-100.0   N        

     

 

             MCHC; Below Low Threshold (test code = 85991-4) 31.6 g/dl    32.0-3

6.0                  

 

             RDW; Normal (test code = 788-0) 14.5 %       11.0-15.0    N        

     

 

             PLATELET COUNT; Normal (test code = 777-3) 283 {Thousand/u} 140-400

      N             

 

             MPV; Normal (test code = 01429-5) 11.4 fL      7.5-12.5     N      

       

 

                    ABSOLUTE NEUTROPHILS (test code = ABSOLUTE NEUTROPHILS) 4957

 {cells/uL}     

4477-3283                 N                          

 

                    ABSOLUTE LYMPHOCYTES (test code = ABSOLUTE LYMPHOCYTES) 2252

 {cells/uL}     850-3900

                          N                          

 

             ABSOLUTE MONOCYTES (test code = ABSOLUTE MONOCYTES) 510 {cells/uL} 

200-950      N             

 

             ABSOLUTE EOSINOPHILS (test code = ABSOLUTE EOSINOPHILS) 332 {cells/

uL}        N            

 

 

             ABSOLUTE BASOPHILS (test code = ABSOLUTE BASOPHILS) 49 {cells/uL} 0

-200        N             

 

             NEUTROPHILS (test code = NEUTROPHILS) 61.2 %                    N  

           

 

             LYMPHOCYTES (test code = LYMPHOCYTES) 27.8 %                    N  

           

 

             MONOCYTES; Normal (test code = 26485-3) 6.3 %                     N

             

 

             EOSINOPHILS; Normal (test code = 10126-3) 4.1 %                    

 N             

 

             BASOPHILS; Normal (test code = 24344-3) 0.6 %                     N

             





[facility][] HEPATITIS B SURFACE ANTIGEN W/REFL CONFIRM
2020 15:05:00* 



             Test Item    Value        Reference Range Interpretation Comments

 

                    HEPATITIS B SURFACE ANTIGEN; Normal (test code = 5195-3) NON

-REACTIVE        

NON-REACTIVE              N                          





[facility]] HEPATITIS C [id number] 15:05:00* 



             Test Item    Value        Reference Range Interpretation Comments

 

             HEPATITIS C ANTIBODY; Normal (test code = 64097-4) NON-REACTIVE NON

-REACTIVE N             

 

             SIGNAL TO CUT-OFF (test code = SIGNAL TO CUT-OFF) 0.01         <1.0

0        N            HCV antibody was 

non-reactive. There is no laboratory evidence of HCV infection. In most cases, 
no further action is required. However,if recent HCV exposure is suspected, a 
test for HCV RNA(test code 34052) is suggested. For additional information 
please refer tohttp://education.Sogou.RelateIQ/faq/FTU10b7(This link is 
being provided for informational/educational purposes only.)





[facility]] T4, [id number] 15:05:00* 



             Test Item    Value        Reference Range Interpretation Comments

 

             T4, FREE (test code = T4, FREE) 1.1 ng/dl    0.8-1.8      N        

     





[facility]] TSH, 3RD [id number] 15:05:00* 



             Test Item    Value        Reference Range Interpretation Comments

 

             TSH; Normal (test code = 20219-6) 1.71 {MIU/L}              N      

      Reference Range              

       > or = 20 Years  0.40-4.50                          Pregnancy Ranges     
     First trimester    0.26-2.66          Second trimester   0.55-2.73         
 Third trimester    0.43-2.91





[facility]] HEMOGLOBIN [id number] 15:05:00* 



             Test Item    Value        Reference Range Interpretation Comments

 

                HEMOGLOBIN A1c; Above High Threshold (test code = 4548-4) 5.9 {%

 of total} <5.7            

                                        For someone without known diabetes, a he

moglobin A1c value between 5.7% and 

6.4% is consistent withprediabetes and should be confirmed with a follow-up 
test. For someone with known diabetes, a value &lt;7%indicates that their 
diabetes is well controlled. W9vsfqsoui should be individualized based on 
duration ofdiabetes, age, comorbid conditions, and otherconsiderations. This 
assay result is consistent with an increased riskof diabetes. Currently, no 
consensus exists regarding use ofhemoglobin A1c for diagnosis of diabetes for 
children.





[facility]] [id number] 15:05:00* 



             Test Item    Value        Reference Range Interpretation Comments

 

                                        RPR (MONITOR) W/REFL TITER (REFL) (test 

code = RPR (MONITOR) W/REFL TITER 

(REFL))         NON-REACTIVE    NON-REACTIVE    N                





[facility] [id number] 16:35:0035[facility]villeNizxhyGIFQQSOSEV4555-07-26 16:35:0030[facility] GreenvilleRdlvroOVFPKSKDZW3620-46-16
 16:35:28896YKHunt [facility] Medical Center at GreenvilleKlszryNTLEEGVFCK0151-57-22 16:35:26003MYHunt Regional Medical Center at GreenvilleVglohjUJWALYJVED3877-65-00 16:35:10043PXHunt [facility] Center at GreenvilleHEMATOLOGY
2019 16:35:00* 



             Test Item    Value        Reference Range Interpretation Comments

 

             INR (test code = INR) 1.03 1       0.85-1.17                  





[facility] GreenvilleZrsbgiVXVFEOCLQW3681-48-35 16:35:00* 



             Test Item    Value        Reference Range Interpretation Comments

 

             PT (test code = PT) 13.3 s       12.0-14.7                  





[facility] 16:35[facility]* 



             Test Item    Value        Reference Range Interpretation Comments

 

             PTT (test code = PTT) 40.0 s       22.9-35.8                  





[facility] GreenvilleRcgsjuIXWONBZLMI8692-73-95 14:11:0032[facility]
SHYRJRADLY3439-54-11 14:11:0081[facility] GreenvilleBvzobeBDGHWMLTZE3952-45-07 
14:11:0038[facility] GreenvilleANEMIA MEHAQ3582-74-73 13:01:95590NBUT Health HendersonMIA AWVEV0985-04-68 13:01:0019.7[facility]
HVKXLTMEES3396-83-64 13:01:007.7[facility] GreenvilleNjdebmIXYCQEUOXC0483-01-56 
13:01:004.53[facility] GreenvillePccdfvAZYMTEIJWM1224-52-28 13:01:0012.0[facility] GreenvilleJshlkdRJKYHDXKFS7547-08-39 13:01:0036.8[facility] GreenvilleHEMATOLOGY
2019 13:01:0081.[address] [place], [place] [address]TjjekgAMNODPKKBG0620-84-00 13:01:00* 



             Test Item    Value        Reference Range Interpretation Comments

 

             MCH (test code = MCH) 26.6 pg      27.0-31.0                  





[facility] GreenvilleHlsjshWDOYQNOBGI2925-00-17 13:01:0032.7[facility]
MRAQKZCUUD3725-55-17 13:01:0016.[address] [place], [place] [address]WcytnlSUASYJBZVA5501-11-78 
13:01:82187BAH[facility] Center at GreenvilleInqtqvLUTSYFFYNM6091-91-62 13:01:009.6[facility] HospitalJncaiwOUUIEXAQHU2923-91-00 13:01:00* 



             Test Item    Value        Reference Range Interpretation Comments

 

             PT Baseline (test code = PT Baseline) 13.2 s       12.0-14.7       

           





[facility] 13:01:00* 



             Test Item    Value        Reference Range Interpretation Comments

 

             PT 1:1 Imm (test code = PT 1:1 Imm) 12.8 s                         

         





[facility] 13:01:00* 



             Test Item    Value        Reference Range Interpretation Comments

 

             PT 1:1 1Hr (test code = PT 1:1 1Hr) 12.8 s                         

         





[facility] GreenvilleZuolpzNWDIBQBWKO3337-47-27 13:01:00* 



             Test Item    Value        Reference Range Interpretation Comments

 

             PTT Baseline (test code = PTT Baseline) 38.8 s       22.9-35.8     

             





[facility] GreenvilleMzkwhfEAFTTLRGBT9660-94-34 13:01:00* 



             Test Item    Value        Reference Range Interpretation Comments

 

             TT Baseline (test code = TT Baseline) 16.6 s       15.0-21.1       

           





[facility] GreenvilleAnhxcuXDACXFEHBH0882-68-28 13:01:00* 



             Test Item    Value        Reference Range Interpretation Comments

 

             PTT 1:1 Imm (test code = PTT 1:1 Imm) 33.8 s                       

           





[facility] 13:01:00* 



             Test Item    Value        Reference Range Interpretation Comments

 

             PTT 1:1 1Hr (test code = PTT 1:1 1Hr) 34.8 s                       

           





[facility] GreenvilleZcnnjyNFRMSSIZEW8448-10-35 13:01:0059.[address] [place] [address]
YVNEYGKSVS6532-77-55 13:01:0028.[address] [place], [place] [address]QppozjQJNQLWKSWF9925-72-90 
13:01:006.[address] [place], [place] [address]DwqyfkVHBPOBTFZL8073-84-58 13:01:005.[address] [place], [place] [address]XwsdbcNSEZFZGUXD9962-25-04 13:01:000.[address] [place], [place] [address]HEMATOLOGY
2019 13:01:004.[address] [place], [place] [address]DdvajpWLZYNVGTRE8928-98-38 13:01:002.[address] [place], [place] [address]JqjzkzZNBCHVESOS1618-92-24 13:01:000.[address] [place] [address]
NTWIZOGDTL6993-70-11 13:01:000.4[facility] GreenvilleDgvjayKBMFEHXOZD6256-44-89 
13:01:000.[address] [place], [place] [address]PqqpqzKEXKFZLXIN0390-81-90 11:40:009.[address] [place], [place] [address]QosdvlICBGWUGNCD2561-61-21 11:40:0028.[address] [place], [place] [address]BLOOD BANK
 SAPJNFX9016-36-40 22:34:00Product available (1/11/15 4:34 PM)[facility]villeBL[facility] EZJOUTE7978-50-30 15:55:00Product available (1/11/15 9:55 AM)[facility]villeBL[facility] XALOPCU2274-93-52 13:13:00Product available 
(1/11/15 7:13 AM)[facility]villeBL[facility] DSHTOJY0648-13-48 04:25:00
Negative (1/10/15 10:25 PM)[facility] GreenvilleDxcneyCGVTLCZGVH6714-45-05 04:25:00
9.3[facility] HospitalUxxffhBTBUTLFCAK9217-96-64 04:25:003.73[facility] GreenvilleIoihhzDWBUNXVUQY6274-28-20 04:25:0010.3[facility] HospitalHEMATOLOGY
2015 04:25:00* 



             Test Item    Value        Reference Range Interpretation Comments

 

             MCH (test code = MCH) 27.6 pg      27.0-31.0                  





[facility] GreenvilleAvqmzhDLDAMYAKPZ2647-04-34 04:25:0032.[address] [place] [address]
EIARLEREWG2950-95-72 04:25:10376ATH[facility] GreenvilleHkfiajUPJQAZEFCH2968-75-98 
04:25:0015.2[facility] HospitalWabcriRGHTRGFMHS5832-92-42 04:25:0085.9[facility] GreenvilleEmectvOVKNYPVOJQ4314-86-63 04:25:0032.1[facility] HospitalHEMATOLOGY
2015 04:25:0010.3[facility] HospitalPkdjayACFUSFSZRX0560-33-26 04:25:0022.2
[facility] GreenvilleFktbekQKUQXMPBSP9650-22-77 04:25:006.0[facility]
EIAWQAQGRN6974-10-47 04:25:002.4[facility] GreenvilleZzcxwyPLHONAIODH1789-94-91 
04:25:000.8[facility] GreenvilleIowvduOUVJHHCKPL1549-92-56 04:25:006.4[facility] GreenvilleCovbuuCCOKWCKAGN6516-61-47 04:25:000.6[facility] HospitalHEMATOLOGY
2015 04:25:000.2[facility] HospitalUwexvyQGRUQALDCZ3891-41-54 04:25:000.[address] [place], [place] [address]QcopepJYXUDKRPWY9654-62-78 04:25:002.1[facility]
XLZDMTSWIM1275-67-56 04:25:0068.6[facility] HospitalCruwlqXFXGTLAWRC7766-66-65 
04:25:00Negative *NA*(1/10/15 10:25 PM)[facility] GreenvilleIMMUNOLOGY
2015 04:25:00Non Reactive *NA*(1/10/15 10:25 PM)[facility]
BLOOD BANK RESULTS2014-10-03 13:10:00Negative (10/3/14 8:10 AM)[facility]villeHEMATOLOGY2014-10-03 13:04:069.7[facility]villeHEMATOLOGY
2014-10-03 13:04:83623ARH[facility] Center at GreenvilleHEMATOLOGY2014-10-03 13:04:0630.[address] [place], [place] [address]HEMATOLOGY2014-10-03 13:04:06* 



             Test Item    Value        Reference Range Interpretation Comments

 

             MCH (test code = MCH) 28.6 pg      27.0-31.0                  





[facility] GreenvilleIvwijvOHVKBSPQPJ3067-31-83 13:04:0685.9[facility]
HEMATOLOGY2014-10-03 13:04:[address]33.[address] [place], [place] [address]HEMATOLOGY2014-10-03 
13:04:0615.0[facility]villeHEMATOLOGY2014-10-03 13:04:0610.0[facility]villeHEMATOLOGY2014-10-03 13:04:063.[address] [place], [place] [address]HEMATOLOGY
2014-10-03 13:04:068.[address] [place], [place] [address]HEMATOLOGY2014-10-03 13:04:0620.[address] [place], [place] [address]HEMATOLOGY2014-10-03 13:04:065.[address] [place] [address]
GZHWMNQEGJ5799-20-12 13:04:0670.[address] [place], [place] [address]HorziaAFFUYBZHWW3975-05-77 
13:04:063.7[facility]villeHEMATOLOGY2014-10-03 13:04:060.[address] [place], [place] [address]HEMATOLOGY2014-10-03 13:04:061.7[facility]villeHEMATOLOGY
2014-10-03 13:04:060.5[facility]villeHEMATOLOGY2014-10-03 13:04:065.8[facility]villeHEMATOLOGY2014-10-03 13:04:060.[address] [place], [place] [address]CHEM 
MCFIL3288-62-24 10:09:0089[facility] GreenvilleCHEM XNRZH3281-78-06 10:09:0041
[facility] GreenvilleRttrctFOQLEPCGBTRS9443-56-90 10:09:0015.9[facility] GreenvilleXdpjqeIPUAZYNXITIV6270-77-43 10:09:0010[facility] GreenvilleELECTROLYTES
2014 10:09:003.2[facility] HospitalEaxcggKKQWMKNOOSWH6860-65-47 10:09:000.8
[facility] GreenvilleAmllefKGQLHMKVZBCF0608-16-15 10:09:46919WSH[facility] Greenville
IAMVIVUBVCKR8881-09-63 10:09:002.6[facility] HospitalCrugqlOPPAFNIERTFH7264-62-16 
10:09:000.4[facility] GreenvilleGtzobrAEGGZZUDVSIG7025-92-47 10:09:0050[facility] GreenvilleZyfmakKGAXXBSKGLIK8558-88-09 10:09:0014[facility]
AHJMHOZYUETZ2695-99-04 10:09:0023[facility] GreenvilleGfsxbcBITDBFLQWGFY2440-92-36 
10:09:005.8[facility] GreenvilleJddhgyPGECJVLLJKVJ4298-69-44 10:09:008.8[facility] GreenvilleGpctbdOJZJUKOALYQO9317-22-98 10:09:0022[facility]
WJWZYSMEGRJV3306-33-32 10:09:72287EQH[facility] Center at GreenvilleGbdnhaKKORZBDIDHCG0678-19-20 
10:09:003.9[facility] GreenvilleLjlazzEQVNTHODIGRW8335-77-48 10:09:55197RYH[facility] Center at GreenvilleQcufacTCTHZSZBYBJH5070-11-26 10:09:000.6[facility]
YQMWXIOPOPQG8899-03-55 10:09:0083[facility] GreenvilleKljegjBUYRZZSFVFRX8708-19-02 
10:09:006[facility] GreenvilleAejbbfLFRPQPZLFW5800-90-06 10:09:000.5[facility] GreenvilleRtjnmfQLBFXQNSWJ8102-04-66 10:09:002.[address] [place], [place] [address]HEMATOLOGY
2014 10:09:005.9[facility] GreenvilleZrgiomCPDJKQNDSS2764-83-38 10:09:000.4[facility] GreenvilleMcbbitFFFUPZMNLS9738-13-67 10:09:004.9[facility]
XNERCIYNJV1056-99-41 10:09:0061.4[facility] GreenvilleJdlkyoMEBSKXCEOP9316-30-61 
10:09:000.5[facility] GreenvilleAyanojDMECINKKHV2455-57-86 10:09:0027.2[facility] HospitalJtlvyeGCZIYNDMRN2393-74-25 10:09:005.0[facility] GreenvilleHEMATOLOGY
2014 10:09:008.0[facility] GreenvilleMnkcpoVLTVPSHMPY0945-53-31 10:09:0010.2[facility] HospitalVqugwcMCPIPMOPUD5975-96-68 10:09:003.61[facility]
OABXOTENLB3554-17-69 10:09:0030.[address] [place], [place] [address]EwbmzgIZHUVEJBHD4280-85-52 
10:09:00* 



             Test Item    Value        Reference Range Interpretation Comments

 

             MCH (test code = MCH) 28.3 pg      27.0-31.0                  





[facility] GreenvilleGjwlhlFRAIUEJFGX2220-03-03 10:09:0033.5[facility]
BTHOPTZYIL0696-04-12 10:09:0084.[address] [place], [place] [address]ATOLOGY2014-08-01 
10:09:36564BSL[facility] Healthcare CenterATOLOGY2014-08-01 10:09:0014.[address] [place], [place] [address]AtopvbVHDSSAVXAU7911-27-67 10:09:009.3M[facility] CenterURINE AND 
OVVZM2457-70-62 14:03:00Clear (14 9:03 AM)[facility] CenterURINE AND 
LOVXP8097-95-42 14:03:001.006[facility] CenterURINE AND GXEUN7208-50-66 
14:03:00Light Yellow *NA*(14 9:03 AM)[facility] CenterURINE AND STOOL
2014 14:03:001[facility] CenterURINE AND EEZJF5315-66-86 14:03:00
Negative (14 9:03 AM)[facility] CenterURINE AND FDUUE3641-80-03 
14:03:00Negative (14 9:03 AM)[facility] CenterURINE AND STOOL
2014 14:03:00Small *ABN*(14 9:03 AM)[facility] CenterURINE AND 
NOKJX5937-89-28 14:03:007.0[facility] CenterURINE AND ZVWGC3881-82-40 
14:03:00Negative *NA*(14 9:03 AM)[facility] GreenvilleTHYROID PANEL
2014 01:09:241.040[facility] GreenvilleCHEM GYIUM2736-32-67 01:09:003.8
[facility] GreenvilleCHEM MGFUI5438-66-11 01:09:000.9[facility]
CHEM WECMZ4980-77-50 01:09:0016.[address] [place], [place] [address]CHEM BXSJA5354-01-64 
01:09:0015[facility] GreenvilleCHEM SLMQF3492-86-98 01:09:18951DAHunt Regional Medical Center at GreenvilleCHEM AGSTV1557-69-73 01:09:0027[facility] GreenvilleCHEM PANEL
2014 01:09:0070[facility] GreenvilleCHEM CQREL8462-95-43 01:09:000.[address] [place], [place] [address]CHEM AIBDS3490-07-06 01:09:007.[address] [place], [place] [address]CHEM 
GKFDS2845-70-33 01:09:003.[address] [place], [place] [address]CHEM CXRDJ2726-17-01 01:09:00
25[facility] GreenvilleCHEM PALVT2190-25-08 01:09:0085[facility]
CHEM FOQPP9173-25-10 01:09:0023[facility] GreenvilleCHEM WXVON3877-51-05 
01:09:009.[address] [place], [place] [address]CHEM RVAOL5356-51-47 01:09:92977VZHunt Regional Medical Center at GreenvilleCHEM HMLCZ2466-61-62 01:09:004.[address] [place], [place] [address]CHEM PANEL
2014 01:09:33550OEH[facility] Medical Center at GreenvilleCHEM IKUUF7929-60-96 01:09:009[facility] GreenvilleCHEM NDGJJ5403-83-58 01:09:000.[address] [place], [place] [address]CHEM 
AALOZ3436-24-22 01:09:26308IJH[facility] Center at GreenvilleCHEM QWOPI2489-22-98 01:09:00
51[facility] GreenvilleUwfzzlQJAZRVUUMP9048-06-05 01:09:005.0[facility]
RMDYVOWDYE2870-93-23 01:09:0026.[address] [place], [place] [address]VkmjesJHSXDAMMPC8441-24-10 
01:09:[address]64.[address] [place], [place] [address]QeaxzsMWMMVSPMTQ3520-19-92 01:09:002.[address] [place], [place] [address]WjhrgvAEYCQJABSZ6418-27-80 01:09:006.4[facility]villeHEMATOLOGY
2014 01:09:000.6[facility] HospitalVfyfsrFAGESWWLEJ2661-66-51 01:09:000.5[facility] GreenvilleDmfyljFFZEKYTIKV4938-50-65 01:09:003.6[facility]
QIBUWSPOPW3068-32-25 01:09:000.1[facility] HospitalKdczebCPBVZSUKQW6816-91-92 
01:09:000.4[facility] GreenvilleUptpgbMEZXGKMFXY4555-60-74 01:09:009.9[facility] GreenvilleQeqanmUVCIYMMBXX9219-34-78 01:09:0011.8[facility]villeHEMATOLOGY
2014 01:09:004.18[facility] GreenvilleGdwrzoDIWLRNVGBX2621-69-90 01:09:0084.5
[facility] GreenvilleMbnmqkWGRCCRSHTA4999-44-49 01:09:0035.4[facility]
UVSFEWCKNX0545-41-99 01:09:0033.5[facility] GreenvilleQfttxwUGLVELNLDX1468-64-03 
01:09:00* 



             Test Item    Value        Reference Range Interpretation Comments

 

             MCH (test code = MCH) 28.3 pg      27.0-31.0                  





[facility] GreenvilleWguzwkRYBEEZMXRW0437-30-09 01:09:08457QOHunt [facility] Center at Greenville
NMLWUQMFQG0126-14-16 01:09:0014.2[facility] HospitalVsaopdINHFAAZILG7946-39-66 
01:09:009.5[facility] GreenvilleEoipqiPBBPTFKJMJUZO0317-56-36 23:55:13399008ABH[facility] Center at GreenvilleCHEM PEFOM5659-57-59 23:55:09660IDH[facility] Center at GreenvilleCHEM PANEL
2014 23:55:229.7[facility] GreenvilleCHEM EZDIM1743-43-72 23:55:2226[facility] GreenvilleCHEM QVFHX8821-23-70 23:55:67807OIHunt Regional Medical Center at GreenvilleCHEM 
YQQYT4787-68-78 23:55:2213[facility] GreenvilleCHEM DACZV9362-54-84 23:55:22
0.7[facility] GreenvilleCHEM QAOQL7211-95-68 23:55:85145YKHunt Regional Medical Center at GreenvilleCHEM RESUF7664-61-15 23:55:2291[facility] GreenvilleCHEM MPING8863-48-76
 23:55:223.7[facility] GreenvilleCHEM VKOUU2143-38-08 23:55:2212.7[facility] GreenvilleYzwxaoKAKCVUXGUX4429-33-80 23:55:18Normal (14 6:55 PM)[facility] GreenvilleMfcralZOHNJVBXEN5911-32-21 23:55:18Normal (14 6:55 PM)[facility] GreenvilleZjwolwSDVYIKGERU6144-75-56 23:55:180.4[facility] GreenvilleHEMATOLOGY
2014 23:55:180.7[facility] GreenvilleJmrjwgZNGQQVNDSN8109-35-86 23:55:182.5[facility] GreenvilleHujnhsKFIQJVZQAO2767-36-89 23:55:1810.[address] [place] [address]
ENBOSPIFPN7568-35-60 23:55:180.0[facility] GreenvilleGkmpjzNCTNVJAXYY4314-94-56 
23:55:182.8[facility] GreenvilleCypuptRFWEMAWNRQ8064-32-82 23:55:184.9[facility] GreenvilleDkkcrqXCKWTNUEKA5544-19-47 23:55:1818.[address] [place], [place] [address]HEMATOLOGY
2014 23:55:1874.[address] [place], [place] [address]QoxmacMLWKMPBEEV4883-13-04 23:55:180.0[facility] GreenvilleHaswjuNACNVVDJUK2775-27-07 23:55:1813.9[facility]
JOWZIXZZVX1397-17-11 23:55:1833.8[facility] GreenvilleUtatrsWHRZXMUSPN8651-07-39 
23:55:184.04[facility] GreenvilleOfppwyHXNXFREDFW9111-33-57 23:55:1812.[address] [place], [place] [address]GclksaNMRPTWKVZD1279-21-95 23:55:189.[address] [place], [place] [address]HEMATOLOGY
2014 23:55:18* 



             Test Item    Value        Reference Range Interpretation Comments

 

             MCH (test code = MCH) 29.9 pg      27.0-31.0                  





[facility] GreenvilleAzawqnYOATKDKFIA9368-97-63 23:55:1835.7[facility]
UFPULFWOPL0948-51-54 23:55:1883.8[facility] GreenvilleXdfubtRNZJFZQVMM6044-15-98 
23:55:[address]13.[address] [place], [place] [address]NprlmdEFRPOFESAP7528-68-54 23:55:75727RJHunt [facility] Center at GreenvilleURINE AND SXFAF7274-37-94 21:50:00Performed (14 4:50 PM)[facility] GreenvilleURINE AND FASCQ7442-04-10 21:50:00None Seen (14 4:50 PM)
[facility] GreenvilleURINE AND JACBE9531-76-27 21:50:00Negative *NA*(14 
4:50 PM)[facility] GreenvilleURINE AND WUIPW8998-06-71 21:50:00Negative 
(14 4:50 PM)[facility] GreenvilleURINE AND SMDCE5771-76-03 21:50:000.2[facility] HospitalUR[facility] AND JDHFK2040-38-66 21:50:00Negative (14 4:50 PM)
[facility]villeURINE AND YRDTC3810-54-17 21:50:00Clear (14 4:50 PM)
[facility]villeURINE AND GRHBE5594-67-20 21:50:00Negative (14 4:50 
PM)[facility]villeURINE AND FNCUI3675-71-69 21:50:00* 



             Test Item    Value        Reference Range Interpretation Comments

 

             UA pH (test code = UA pH) 6.0 1        5.0-8.0                    





[facility]villeURINE AND ARHBQ4162-43-31 21:50:00* 



             Test Item    Value        Reference Range Interpretation Comments

 

             UA Spec Grav (test code = UA Spec Grav) 1.025 1                    

             





[facility] GreenvilleURINE AND TCQFP0097-71-93 21:50:00Yellow *NA*(14 4:50
 PM)[facility] GreenvilleURINE USDY9976-42-08 21:50:00Positive *ABN*(14 
4:50 PM)[facility]

## 2021-03-24 ENCOUNTER — HOSPITAL ENCOUNTER (EMERGENCY)
Dept: HOSPITAL 88 - ER | Age: 31
Discharge: HOME | End: 2021-03-24
Payer: COMMERCIAL

## 2021-03-24 VITALS — WEIGHT: 293 LBS | HEIGHT: 70 IN | BODY MASS INDEX: 41.95 KG/M2

## 2021-03-24 DIAGNOSIS — S62.611A: Primary | ICD-10-CM

## 2021-03-24 DIAGNOSIS — J45.909: ICD-10-CM

## 2021-03-24 DIAGNOSIS — D68.0: ICD-10-CM
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
## 2021-03-24 PROCEDURE — 99283 EMERGENCY DEPT VISIT LOW MDM: CPT
